# Patient Record
Sex: FEMALE | Race: WHITE | Employment: FULL TIME | URBAN - METROPOLITAN AREA
[De-identification: names, ages, dates, MRNs, and addresses within clinical notes are randomized per-mention and may not be internally consistent; named-entity substitution may affect disease eponyms.]

---

## 2017-07-14 PROBLEM — J30.1 SEASONAL ALLERGIC RHINITIS DUE TO POLLEN: Status: ACTIVE | Noted: 2017-07-14

## 2017-08-19 PROBLEM — O48.0 POST-TERM PREGNANCY, 40-42 WEEKS OF GESTATION: Status: RESOLVED | Noted: 2017-05-03 | Resolved: 2017-08-19

## 2018-01-19 PROBLEM — G56.02 LEFT CARPAL TUNNEL SYNDROME: Status: ACTIVE | Noted: 2018-01-19

## 2019-02-26 PROBLEM — M25.561 CHRONIC PAIN OF BOTH KNEES: Status: ACTIVE | Noted: 2019-02-26

## 2019-02-26 PROBLEM — M25.562 CHRONIC PAIN OF BOTH KNEES: Status: ACTIVE | Noted: 2019-02-26

## 2019-10-03 PROBLEM — Z3A.35 35 WEEKS GESTATION OF PREGNANCY: Status: ACTIVE | Noted: 2019-10-03

## 2019-11-02 PROBLEM — Z3A.39 39 WEEKS GESTATION OF PREGNANCY: Status: ACTIVE | Noted: 2019-11-02

## 2019-11-02 PROBLEM — O36.8130 DECREASED FETAL MOVEMENT AFFECTING MANAGEMENT OF PREGNANCY IN THIRD TRIMESTER: Status: RESOLVED | Noted: 2019-10-03 | Resolved: 2019-11-02

## 2019-11-03 PROBLEM — O99.213 MATERNAL OBESITY SYNDROME IN THIRD TRIMESTER: Status: RESOLVED | Noted: 2017-05-03 | Resolved: 2019-11-03

## 2019-11-03 PROBLEM — Z3A.39 39 WEEKS GESTATION OF PREGNANCY: Status: RESOLVED | Noted: 2019-11-02 | Resolved: 2019-11-03

## 2020-01-10 PROBLEM — F32.4 MAJOR DEPRESSIVE DISORDER WITH SINGLE EPISODE, IN PARTIAL REMISSION (HCC): Status: ACTIVE | Noted: 2020-01-10

## 2020-01-10 PROBLEM — F90.2 ATTENTION DEFICIT HYPERACTIVITY DISORDER (ADHD), COMBINED TYPE: Status: ACTIVE | Noted: 2020-01-10

## 2020-01-28 PROBLEM — Z01.818 PREOPERATIVE CLEARANCE: Status: ACTIVE | Noted: 2020-01-28

## 2020-01-28 PROBLEM — K21.9 CHRONIC GERD: Status: ACTIVE | Noted: 2020-01-28

## 2020-02-27 PROBLEM — Z01.818 PREOPERATIVE CLEARANCE: Status: RESOLVED | Noted: 2020-01-28 | Resolved: 2020-02-27

## 2020-05-05 PROBLEM — R60.0 BILATERAL LEG EDEMA: Status: ACTIVE | Noted: 2020-05-05

## 2020-08-11 PROBLEM — M79.89 LEG SWELLING: Status: ACTIVE | Noted: 2020-08-11

## 2020-08-11 PROBLEM — D50.8 IRON DEFICIENCY ANEMIA SECONDARY TO INADEQUATE DIETARY IRON INTAKE: Status: ACTIVE | Noted: 2020-08-11

## 2020-08-11 PROBLEM — J30.1 SEASONAL ALLERGIC RHINITIS DUE TO POLLEN: Status: RESOLVED | Noted: 2017-07-14 | Resolved: 2020-08-11

## 2020-10-05 PROBLEM — Z34.91 PRENATAL CARE IN FIRST TRIMESTER: Status: ACTIVE | Noted: 2020-10-05

## 2021-02-09 PROBLEM — N83.201 CYST OF RIGHT OVARY: Status: RESOLVED | Noted: 2020-10-06 | Resolved: 2021-02-09

## 2021-03-18 PROBLEM — R06.02 SHORTNESS OF BREATH: Status: ACTIVE | Noted: 2021-03-18

## 2021-05-19 PROBLEM — O26.891 SHORTNESS OF BREATH DUE TO PREGNANCY IN FIRST TRIMESTER: Status: RESOLVED | Noted: 2021-03-18 | Resolved: 2021-05-19

## 2021-09-23 PROBLEM — R00.2 PALPITATIONS: Status: ACTIVE | Noted: 2021-09-23

## 2021-10-29 PROBLEM — K44.9 HIATAL HERNIA: Status: ACTIVE | Noted: 2021-10-29

## 2022-02-18 PROBLEM — I47.9 PAROXYSMAL TACHYCARDIA (HCC): Status: ACTIVE | Noted: 2022-02-18

## 2022-05-03 PROBLEM — J31.0 CHRONIC RHINITIS: Status: RESOLVED | Noted: 2022-01-14 | Resolved: 2022-05-03

## 2022-05-03 PROBLEM — R06.02 SHORTNESS OF BREATH: Status: RESOLVED | Noted: 2021-03-18 | Resolved: 2022-05-03

## 2022-05-03 PROBLEM — M79.89 LEG SWELLING: Status: RESOLVED | Noted: 2020-08-11 | Resolved: 2022-05-03

## 2023-03-16 PROBLEM — K21.9 CHRONIC GERD: Status: RESOLVED | Noted: 2020-01-28 | Resolved: 2023-03-16

## 2023-03-21 LAB
C. TRACHOMATIS, EXTERNAL RESULT: NEGATIVE
N. GONORRHOEAE, EXTERNAL RESULT: NEGATIVE

## 2023-04-04 PROBLEM — O46.8X1 SUBCHORIONIC HEMATOMA IN FIRST TRIMESTER: Status: ACTIVE | Noted: 2023-04-04

## 2023-04-04 PROBLEM — Z34.91 PRENATAL CARE IN FIRST TRIMESTER: Status: ACTIVE | Noted: 2023-04-04

## 2023-04-04 PROBLEM — J45.909 ASTHMA AFFECTING PREGNANCY IN FIRST TRIMESTER: Status: ACTIVE | Noted: 2023-04-04

## 2023-04-04 PROBLEM — O99.511 ASTHMA AFFECTING PREGNANCY IN FIRST TRIMESTER: Status: ACTIVE | Noted: 2023-04-04

## 2023-04-04 LAB
ABO, EXTERNAL RESULT: NORMAL
HEP B, EXTERNAL RESULT: NONREACTIVE
HEPATITIS C ANTIBODY, EXTERNAL RESULT: NEGATIVE
HIV, EXTERNAL RESULT: NEGATIVE
RH FACTOR, EXTERNAL RESULT: POSITIVE
RPR, EXTERNAL RESULT: NONREACTIVE
RUBELLA TITER, EXTERNAL RESULT: NORMAL
T. PALLIDUM (SYPHILIS) ANTIBODY, EXTERNAL RESULT: NONREACTIVE

## 2023-05-22 ENCOUNTER — OFFICE VISIT (OUTPATIENT)
Dept: FAMILY MEDICINE CLINIC | Age: 33
End: 2023-05-22
Payer: COMMERCIAL

## 2023-05-22 VITALS
SYSTOLIC BLOOD PRESSURE: 93 MMHG | DIASTOLIC BLOOD PRESSURE: 63 MMHG | HEART RATE: 83 BPM | WEIGHT: 211 LBS | BODY MASS INDEX: 36.22 KG/M2 | OXYGEN SATURATION: 97 %

## 2023-05-22 DIAGNOSIS — I47.9 PAROXYSMAL TACHYCARDIA (HCC): ICD-10-CM

## 2023-05-22 DIAGNOSIS — J45.40 MODERATE PERSISTENT ASTHMA WITHOUT COMPLICATION: Primary | ICD-10-CM

## 2023-05-22 DIAGNOSIS — L98.7 EXCESS SKIN OF ABDOMINAL WALL: ICD-10-CM

## 2023-05-22 DIAGNOSIS — F32.1 CURRENT MODERATE EPISODE OF MAJOR DEPRESSIVE DISORDER WITHOUT PRIOR EPISODE (HCC): ICD-10-CM

## 2023-05-22 PROCEDURE — G8427 DOCREV CUR MEDS BY ELIG CLIN: HCPCS | Performed by: FAMILY MEDICINE

## 2023-05-22 PROCEDURE — 1036F TOBACCO NON-USER: CPT | Performed by: FAMILY MEDICINE

## 2023-05-22 PROCEDURE — G8417 CALC BMI ABV UP PARAM F/U: HCPCS | Performed by: FAMILY MEDICINE

## 2023-05-22 PROCEDURE — 99214 OFFICE O/P EST MOD 30 MIN: CPT | Performed by: FAMILY MEDICINE

## 2023-05-22 ASSESSMENT — PATIENT HEALTH QUESTIONNAIRE - PHQ9
SUM OF ALL RESPONSES TO PHQ QUESTIONS 1-9: 16
10. IF YOU CHECKED OFF ANY PROBLEMS, HOW DIFFICULT HAVE THESE PROBLEMS MADE IT FOR YOU TO DO YOUR WORK, TAKE CARE OF THINGS AT HOME, OR GET ALONG WITH OTHER PEOPLE: 1
6. FEELING BAD ABOUT YOURSELF - OR THAT YOU ARE A FAILURE OR HAVE LET YOURSELF OR YOUR FAMILY DOWN: 2
9. THOUGHTS THAT YOU WOULD BE BETTER OFF DEAD, OR OF HURTING YOURSELF: 1
3. TROUBLE FALLING OR STAYING ASLEEP: 3
2. FEELING DOWN, DEPRESSED OR HOPELESS: 2
5. POOR APPETITE OR OVEREATING: 3
1. LITTLE INTEREST OR PLEASURE IN DOING THINGS: 0
4. FEELING TIRED OR HAVING LITTLE ENERGY: 3
8. MOVING OR SPEAKING SO SLOWLY THAT OTHER PEOPLE COULD HAVE NOTICED. OR THE OPPOSITE, BEING SO FIGETY OR RESTLESS THAT YOU HAVE BEEN MOVING AROUND A LOT MORE THAN USUAL: 0
SUM OF ALL RESPONSES TO PHQ QUESTIONS 1-9: 16
SUM OF ALL RESPONSES TO PHQ QUESTIONS 1-9: 16
SUM OF ALL RESPONSES TO PHQ QUESTIONS 1-9: 15
SUM OF ALL RESPONSES TO PHQ9 QUESTIONS 1 & 2: 2
7. TROUBLE CONCENTRATING ON THINGS, SUCH AS READING THE NEWSPAPER OR WATCHING TELEVISION: 2

## 2023-05-22 ASSESSMENT — COLUMBIA-SUICIDE SEVERITY RATING SCALE - C-SSRS
6. HAVE YOU EVER DONE ANYTHING, STARTED TO DO ANYTHING, OR PREPARED TO DO ANYTHING TO END YOUR LIFE?: NO
1. WITHIN THE PAST MONTH, HAVE YOU WISHED YOU WERE DEAD OR WISHED YOU COULD GO TO SLEEP AND NOT WAKE UP?: NO
2. HAVE YOU ACTUALLY HAD ANY THOUGHTS OF KILLING YOURSELF?: NO

## 2023-05-22 NOTE — PROGRESS NOTES
She presents today for follow-up of her asthma tachycardia depression and she has been having problems with excessive skin on her lower abdomen causing chafing and sometimes rash she is 14 weeks pregnant her OB told her she could use an abdominal support as long as it did not constrict the baby or was not too tight. She states Negin has them. She had a couple small skin tags around her eyes she wanted me to look at and she needed okay to continue to refill her inhalers. She is off her psychiatric medicine because she is pregnant she states the OB told her she could use Zoloft but with her first pregnancy she tried Zoloft and it did not do anything so she is discontinuing to talk to her counselor on a regular basis and states she will be fine. Does admit she is mildly depressed but it is mostly situational  Tests and documents reviewed today: last note     Objective:   BP 93/63   Pulse 83   Wt 211 lb (95.7 kg)   LMP  (LMP Unknown)   SpO2 97%   BMI 36.22 kg/m²   BP Readings from Last 3 Encounters:   05/22/23 93/63   05/11/23 115/70   05/04/23 102/68     Physical Exam  Vitals reviewed. Constitutional:       Appearance: She is well-developed. She is not toxic-appearing. HENT:      Head: Normocephalic. Neck:      Thyroid: No thyroid mass or thyromegaly. Cardiovascular:      Rate and Rhythm: Normal rate and regular rhythm. Heart sounds: Normal heart sounds. No murmur heard. Pulmonary:      Effort: No respiratory distress. Breath sounds: Normal breath sounds. No wheezing or rales. Abdominal:      General: There is no distension. Palpations: Abdomen is soft. There is no mass. Tenderness: There is no abdominal tenderness. There is no guarding or rebound. Musculoskeletal:      Cervical back: Neck supple. Lymphadenopathy:      Cervical: No cervical adenopathy. Upper Body:      Right upper body: No supraclavicular adenopathy. Skin:     General: Skin is warm.    Neurological:

## 2023-05-22 NOTE — PATIENT INSTRUCTIONS
Please read the healthy family handout that you were given and share it with your family. Please compare this printed medication list with your medications at home to be sure they are the same. If you have any medications that are different please contact us immediately at [phone]. Also review your allergies that we have listed, these may also include medications that you have not been able to tolerate, make sure everything listed is correct. If you have any allergies that are different please contact us immediately at [phone]. You may receive a survey in the mail or by email asking about your experience during your visit today. Please complete and return to us so we know how we are serving you.

## 2023-05-31 ENCOUNTER — ROUTINE PRENATAL (OUTPATIENT)
Dept: OBGYN CLINIC | Age: 33
End: 2023-05-31
Payer: COMMERCIAL

## 2023-05-31 VITALS
HEART RATE: 98 BPM | WEIGHT: 211.4 LBS | SYSTOLIC BLOOD PRESSURE: 110 MMHG | DIASTOLIC BLOOD PRESSURE: 68 MMHG | BODY MASS INDEX: 36.29 KG/M2

## 2023-05-31 DIAGNOSIS — Z34.92 PRENATAL CARE IN SECOND TRIMESTER: ICD-10-CM

## 2023-05-31 DIAGNOSIS — Z98.84 S/P LAPAROSCOPIC SLEEVE GASTRECTOMY: ICD-10-CM

## 2023-05-31 DIAGNOSIS — O46.8X1 SUBCHORIONIC HEMATOMA IN FIRST TRIMESTER, SINGLE OR UNSPECIFIED FETUS: Primary | ICD-10-CM

## 2023-05-31 DIAGNOSIS — O99.512 ASTHMA AFFECTING PREGNANCY IN SECOND TRIMESTER: ICD-10-CM

## 2023-05-31 DIAGNOSIS — J45.909 ASTHMA AFFECTING PREGNANCY IN SECOND TRIMESTER: ICD-10-CM

## 2023-05-31 DIAGNOSIS — O41.8X10 SUBCHORIONIC HEMATOMA IN FIRST TRIMESTER, SINGLE OR UNSPECIFIED FETUS: Primary | ICD-10-CM

## 2023-05-31 PROCEDURE — 99213 OFFICE O/P EST LOW 20 MIN: CPT | Performed by: OBSTETRICS & GYNECOLOGY

## 2023-05-31 PROCEDURE — G8417 CALC BMI ABV UP PARAM F/U: HCPCS | Performed by: OBSTETRICS & GYNECOLOGY

## 2023-05-31 PROCEDURE — 1036F TOBACCO NON-USER: CPT | Performed by: OBSTETRICS & GYNECOLOGY

## 2023-05-31 PROCEDURE — G8427 DOCREV CUR MEDS BY ELIG CLIN: HCPCS | Performed by: OBSTETRICS & GYNECOLOGY

## 2023-05-31 RX ORDER — ONDANSETRON 4 MG/1
4 TABLET, FILM COATED ORAL DAILY PRN
Qty: 30 TABLET | Refills: 0 | Status: SHIPPED | OUTPATIENT
Start: 2023-05-31

## 2023-05-31 NOTE — PROGRESS NOTES
Maternal emotional well being screening form completed and reviewed with patient. Current score is 0. Patient given referral to [address] [place] [address] ([phone]):  No

## 2023-05-31 NOTE — PROGRESS NOTES
Return OB Office Visit    CC:   Chief Complaint   Patient presents with    Routine Prenatal Visit       HPI:  Pt seen and examined. No concerns/complaints. Denies VB, LOF, ctx. +small fetal flutters. Maternal wellness questionnaire reviewed - no concerns today. Score 9.      Objective:  /68   Pulse 98   Wt 211 lb 6.4 oz (95.9 kg)   LMP  (LMP Unknown)   BMI 36.29 kg/m²   Gen: AO, NAD  Abd: Soft, NT  FHT: 160    Assessment/Plan:  28 y.o. [id number] at 15w4d (Estimated Date of Delivery: 11/18/23) presents for HUMERA appointment:     Problem List Items Addressed This Visit          Respiratory    Asthma affecting pregnancy in second trimester     Stable on meds, monitor closely             Other    S/P laparoscopic sleeve gastrectomy     Has seen MFM, see note in Care Everywhere  - Recommend qtrimester CBC, iron, ferritin, calcium and vitamin D  - plan qid accuchecks for 1 week rather than 1hr GTT for early screening and q1-2 months if wnl  - needs level 2 anatomy with MFM  - q4wk growth, 2x/week ANFS  - deliver 39-40 weeks or sooner PRN          Prenatal care in second trimester     - FWB: reassuring by US today  - Genetic screening: [id number] neg, it's a boy!  - Anatomy scan: 20 weeks with MFM  - Tdap: 28 weeks  - PNL: A+/ab-, RI, HepB/C neg, RPRnr, HIV neg, VZV immune, UDS neg, Hgb 12., GCCT/trich neg, UCx no growth          Subchorionic hematoma in first trimester (RESOLVED) - Primary       Dispo: RTC in 4 weeks  Abhilash Brunner MD

## 2023-06-01 ENCOUNTER — TELEPHONE (OUTPATIENT)
Dept: OBGYN CLINIC | Age: 33
End: 2023-06-01

## 2023-06-01 PROBLEM — Z34.92 PRENATAL CARE IN SECOND TRIMESTER: Status: ACTIVE | Noted: 2023-04-04

## 2023-06-01 PROBLEM — E66.01 SEVERE OBESITY (BMI 35.0-39.9) WITH COMORBIDITY (HCC): Status: RESOLVED | Noted: 2017-04-06 | Resolved: 2023-06-01

## 2023-06-01 PROBLEM — O99.512 ASTHMA AFFECTING PREGNANCY IN SECOND TRIMESTER: Status: ACTIVE | Noted: 2023-04-04

## 2023-06-01 PROBLEM — Z34.93 PRENATAL CARE IN THIRD TRIMESTER: Status: RESOLVED | Noted: 2020-10-05 | Resolved: 2023-06-01

## 2023-06-01 NOTE — ASSESSMENT & PLAN NOTE
- FWB: reassuring by US today  - Genetic screening: [id number] neg, it's a boy!  - Anatomy scan: 20 weeks with MFM  - Tdap: 28 weeks  - PNL: A+/ab-, RI, HepB/C neg, RPRnr, HIV neg, VZV immune, UDS neg, Hgb 12., GCCT/trich neg, UCx no growth

## 2023-06-01 NOTE — ASSESSMENT & PLAN NOTE
Has seen MFM, see note in Care Everywhere  - Recommend qtrimester CBC, iron, ferritin, calcium and vitamin D  - plan qid accuchecks for 1 week rather than 1hr GTT for early screening and q1-2 months if wnl  - needs level 2 anatomy with MFM  - q4wk growth, 2x/week ANFS  - deliver 39-40 weeks or sooner PRN

## 2023-06-01 NOTE — TELEPHONE ENCOUNTER
Patient called to let Dr. Kate Minor know she has her anatomy set up with [facility] on 6/20/23. They are needing the order faxed or they told her they will send her away with out the order. Please fax order to [facility]. Routing to Dr. Kate Minor and Tequila Hodge.

## 2023-06-05 ENCOUNTER — TELEPHONE (OUTPATIENT)
Dept: OBGYN CLINIC | Age: 33
End: 2023-06-05

## 2023-06-05 RX ORDER — ONDANSETRON 4 MG/1
4 TABLET, ORALLY DISINTEGRATING ORAL 3 TIMES DAILY PRN
Qty: 21 TABLET | Refills: 0 | Status: SHIPPED | OUTPATIENT
Start: 2023-06-05

## 2023-06-05 NOTE — TELEPHONE ENCOUNTER
OK for work note for today. Would recommend continuing zofran and hydration. If she is unable to keep anything down and continues to have symptoms would need evaluation in the ER for possible IV fluids, etc. Thanks.

## 2023-06-05 NOTE — TELEPHONE ENCOUNTER
Pt called reporting she believes she has a stomach virus. Pt has been experiencing diarrhea and emesis since Friday. Pt stated her PCP wanted her to contact her OB due to her being pregnant. Pt states she is not able to keep food or fluids down. Pt also states she tries to take Zofran but she also is having a hard time keeping that down as well. Pt denies fever. Pt has not been tested for Flu/Covid. Pt requesting doctors recommendations as well as a work note for today and tomorrow. Please Advise.

## 2023-06-05 NOTE — TELEPHONE ENCOUNTER
Pt requesting dissolving tablets. Pended medication. Note provided for today. Please sign or advise.

## 2023-06-21 PROBLEM — O44.40 LOW-LYING PLACENTA: Status: ACTIVE | Noted: 2023-06-21

## 2023-06-29 ENCOUNTER — ROUTINE PRENATAL (OUTPATIENT)
Dept: OBGYN CLINIC | Age: 33
End: 2023-06-29
Payer: COMMERCIAL

## 2023-06-29 VITALS
SYSTOLIC BLOOD PRESSURE: 116 MMHG | DIASTOLIC BLOOD PRESSURE: 68 MMHG | HEART RATE: 91 BPM | BODY MASS INDEX: 37.08 KG/M2 | WEIGHT: 216 LBS

## 2023-06-29 DIAGNOSIS — Z98.84 S/P LAPAROSCOPIC SLEEVE GASTRECTOMY: ICD-10-CM

## 2023-06-29 DIAGNOSIS — O41.8X10 SUBCHORIONIC HEMATOMA IN FIRST TRIMESTER, SINGLE OR UNSPECIFIED FETUS: ICD-10-CM

## 2023-06-29 DIAGNOSIS — O99.512 ASTHMA AFFECTING PREGNANCY IN SECOND TRIMESTER: ICD-10-CM

## 2023-06-29 DIAGNOSIS — O46.8X1 SUBCHORIONIC HEMATOMA IN FIRST TRIMESTER, SINGLE OR UNSPECIFIED FETUS: ICD-10-CM

## 2023-06-29 DIAGNOSIS — Z34.92 PRENATAL CARE IN SECOND TRIMESTER: Primary | ICD-10-CM

## 2023-06-29 DIAGNOSIS — J45.909 ASTHMA AFFECTING PREGNANCY IN SECOND TRIMESTER: ICD-10-CM

## 2023-06-29 DIAGNOSIS — O44.40 LOW-LYING PLACENTA: ICD-10-CM

## 2023-06-29 PROCEDURE — G8417 CALC BMI ABV UP PARAM F/U: HCPCS | Performed by: OBSTETRICS & GYNECOLOGY

## 2023-06-29 PROCEDURE — G8427 DOCREV CUR MEDS BY ELIG CLIN: HCPCS | Performed by: OBSTETRICS & GYNECOLOGY

## 2023-06-29 PROCEDURE — 99213 OFFICE O/P EST LOW 20 MIN: CPT | Performed by: OBSTETRICS & GYNECOLOGY

## 2023-06-29 PROCEDURE — 1036F TOBACCO NON-USER: CPT | Performed by: OBSTETRICS & GYNECOLOGY

## 2023-07-03 RX ORDER — FLUTICASONE PROPIONATE 50 MCG
2 SPRAY, SUSPENSION (ML) NASAL DAILY
Qty: 16 G | Refills: 5 | Status: SHIPPED | OUTPATIENT
Start: 2023-07-03

## 2023-07-03 RX ORDER — ALBUTEROL SULFATE 90 UG/1
2 AEROSOL, METERED RESPIRATORY (INHALATION) EVERY 6 HOURS PRN
Qty: 18 EACH | Refills: 5 | Status: SHIPPED | OUTPATIENT
Start: 2023-07-03

## 2023-07-18 ENCOUNTER — TELEPHONE (OUTPATIENT)
Dept: OBGYN CLINIC | Age: 33
End: 2023-07-18

## 2023-07-18 NOTE — TELEPHONE ENCOUNTER
Agree with above recommendations, if she is not having any additional bleeding at this time just needs pelvic rest as above. Thanks.

## 2023-07-18 NOTE — TELEPHONE ENCOUNTER
I did not place her on bedrest.  I advised for pelvic rest due to her previa and told her to limit activity for the weekend since she had a small amount of bleeding after shopping. Will defer to her primary for additional time off. Thank you.

## 2023-07-18 NOTE — TELEPHONE ENCOUNTER
Pt misunderstood pelvic rest for bed rest and she has missed work yesterday and today. Pt states she will lose her job if she does not obtain a letter for those two days. Are we able to provide letter for yesterday and today? Please Advise    I explained the meaning of pelvic rest to pt, she now understands.

## 2023-07-18 NOTE — TELEPHONE ENCOUNTER
Pt stated she spoke to the on call doctor on Sunday night regarding bleeding in pregnancy. Pt stated the on call doctor placed her on bed rest until her next scheduled appt on 7/28/23. Pt is requesting a letter for work stating this. Please Advise if okay for letter?

## 2023-07-27 ENCOUNTER — TELEPHONE (OUTPATIENT)
Dept: OBGYN CLINIC | Age: 33
End: 2023-07-27

## 2023-07-27 NOTE — TELEPHONE ENCOUNTER
Pt returned call and checked her blood sugar after eating and she is now at 100, pt feels better but is still shaky. Informed pt to eat small frequent snacks throughout the day especially if she starts to feel shaky. 's pt.

## 2023-07-27 NOTE — TELEPHONE ENCOUNTER
Pt 23w5d. She is calling because she got dizzy at work. Says she was feeling off, grabbed the counter, did Not fall. They checked her FSBS and it was 50. She is currently eating cheese and crackers and does feel a little better. She will recheck her blood sugar when she is finished eating and call the office. Aware she will need to present to triage if remains low.  Please advise

## 2023-07-28 ENCOUNTER — ROUTINE PRENATAL (OUTPATIENT)
Dept: OBGYN CLINIC | Age: 33
End: 2023-07-28
Payer: COMMERCIAL

## 2023-07-28 VITALS
DIASTOLIC BLOOD PRESSURE: 61 MMHG | WEIGHT: 221.6 LBS | SYSTOLIC BLOOD PRESSURE: 106 MMHG | BODY MASS INDEX: 38.04 KG/M2 | HEART RATE: 91 BPM

## 2023-07-28 DIAGNOSIS — O44.02 COMPLETE PLACENTA PREVIA NOS OR WITHOUT HEMORRHAGE, SECOND TRIMESTER: ICD-10-CM

## 2023-07-28 DIAGNOSIS — Z34.92 PRENATAL CARE IN SECOND TRIMESTER: Primary | ICD-10-CM

## 2023-07-28 DIAGNOSIS — Z98.84 S/P LAPAROSCOPIC SLEEVE GASTRECTOMY: ICD-10-CM

## 2023-07-28 DIAGNOSIS — O44.40 LOW-LYING PLACENTA: ICD-10-CM

## 2023-07-28 PROCEDURE — 99213 OFFICE O/P EST LOW 20 MIN: CPT | Performed by: OBSTETRICS & GYNECOLOGY

## 2023-07-28 PROCEDURE — 1036F TOBACCO NON-USER: CPT | Performed by: OBSTETRICS & GYNECOLOGY

## 2023-07-28 PROCEDURE — G8417 CALC BMI ABV UP PARAM F/U: HCPCS | Performed by: OBSTETRICS & GYNECOLOGY

## 2023-07-28 PROCEDURE — G8427 DOCREV CUR MEDS BY ELIG CLIN: HCPCS | Performed by: OBSTETRICS & GYNECOLOGY

## 2023-07-28 RX ORDER — DIMETHICONE 13 MG/ML
1 LOTION TOPICAL 4 TIMES DAILY
Qty: 100 EACH | Refills: 1 | Status: SHIPPED | OUTPATIENT
Start: 2023-07-28

## 2023-07-28 RX ORDER — GLUCOSAMINE HCL/CHONDROITIN SU 500-400 MG
CAPSULE ORAL
Qty: 100 STRIP | Refills: 1 | Status: SHIPPED | OUTPATIENT
Start: 2023-07-28

## 2023-08-08 RX ORDER — FLUTICASONE PROPIONATE 50 MCG
SPRAY, SUSPENSION (ML) NASAL
Qty: 1 EACH | Refills: 5 | Status: SHIPPED | OUTPATIENT
Start: 2023-08-08

## 2023-08-09 RX ORDER — LANCETS 33 GAUGE
EACH MISCELLANEOUS
Qty: 100 EACH | Refills: 1 | Status: SHIPPED | OUTPATIENT
Start: 2023-08-09

## 2023-08-24 ENCOUNTER — OFFICE VISIT (OUTPATIENT)
Dept: CARDIOLOGY CLINIC | Age: 33
End: 2023-08-24
Payer: COMMERCIAL

## 2023-08-24 ENCOUNTER — TELEPHONE (OUTPATIENT)
Dept: OBGYN CLINIC | Age: 33
End: 2023-08-24

## 2023-08-24 VITALS
WEIGHT: 228.6 LBS | HEART RATE: 94 BPM | SYSTOLIC BLOOD PRESSURE: 110 MMHG | OXYGEN SATURATION: 98 % | BODY MASS INDEX: 39.24 KG/M2 | DIASTOLIC BLOOD PRESSURE: 72 MMHG

## 2023-08-24 DIAGNOSIS — R06.02 SOB (SHORTNESS OF BREATH): ICD-10-CM

## 2023-08-24 DIAGNOSIS — R00.2 PALPITATIONS: ICD-10-CM

## 2023-08-24 DIAGNOSIS — E78.2 MIXED HYPERLIPIDEMIA: Primary | ICD-10-CM

## 2023-08-24 DIAGNOSIS — I47.9 PAROXYSMAL TACHYCARDIA (HCC): ICD-10-CM

## 2023-08-24 PROCEDURE — 99214 OFFICE O/P EST MOD 30 MIN: CPT | Performed by: INTERNAL MEDICINE

## 2023-08-24 PROCEDURE — 1036F TOBACCO NON-USER: CPT | Performed by: INTERNAL MEDICINE

## 2023-08-24 PROCEDURE — G8427 DOCREV CUR MEDS BY ELIG CLIN: HCPCS | Performed by: INTERNAL MEDICINE

## 2023-08-24 PROCEDURE — G8417 CALC BMI ABV UP PARAM F/U: HCPCS | Performed by: INTERNAL MEDICINE

## 2023-08-24 PROCEDURE — 93000 ELECTROCARDIOGRAM COMPLETE: CPT | Performed by: INTERNAL MEDICINE

## 2023-08-24 RX ORDER — LABETALOL 100 MG/1
100 TABLET, FILM COATED ORAL 2 TIMES DAILY
Qty: 180 TABLET | Refills: 3 | Status: SHIPPED | OUTPATIENT
Start: 2023-08-24

## 2023-08-24 NOTE — PATIENT INSTRUCTIONS
Plan:  ~Recommend a cardiac event monitor. Call your insurance company and see if they will cover Vital connect monitor or MediLynx  ~Plain exercise stress test- modified niya    ~Recommend starting Labetalol 100 mg twice a day- check with your OBGYN prior to starting   Cardiac medications reviewed including indications and pertinent side effects. Medication list updated at this visit. Check blood pressure and heart rate at home a few times per week- keep a log with dates and times and bring to office visit   Regular exercise and following a healthy diet encouraged   Follow up with me in December     Your provider has ordered testing for further evaluation. An order/prescription has been included in your paper work. To schedule outpatient testing, contact Central Scheduling by calling [address] [place] [address] ([phone]).

## 2023-08-24 NOTE — TELEPHONE ENCOUNTER
Patient was just seen at her cardiologist today. They ordered a stress test and labetalol 100mg BID. She wants to make sure this is okay for her to take and have the test as she is currently 27w5d. She will be seeing Dr. Guero Flores tomorrow and okay to review at the appointment. Routing to Dr. Guero Flores, patient of Dr. Eliza Luo.

## 2023-08-25 ENCOUNTER — ROUTINE PRENATAL (OUTPATIENT)
Dept: OBGYN CLINIC | Age: 33
End: 2023-08-25
Payer: COMMERCIAL

## 2023-08-25 VITALS
HEART RATE: 100 BPM | WEIGHT: 230 LBS | BODY MASS INDEX: 39.48 KG/M2 | SYSTOLIC BLOOD PRESSURE: 128 MMHG | DIASTOLIC BLOOD PRESSURE: 82 MMHG

## 2023-08-25 DIAGNOSIS — O44.02 COMPLETE PLACENTA PREVIA NOS OR WITHOUT HEMORRHAGE, SECOND TRIMESTER: ICD-10-CM

## 2023-08-25 DIAGNOSIS — O99.512 ASTHMA AFFECTING PREGNANCY IN SECOND TRIMESTER: ICD-10-CM

## 2023-08-25 DIAGNOSIS — J45.909 ASTHMA AFFECTING PREGNANCY IN SECOND TRIMESTER: ICD-10-CM

## 2023-08-25 DIAGNOSIS — O46.8X1 SUBCHORIONIC HEMATOMA IN FIRST TRIMESTER, SINGLE OR UNSPECIFIED FETUS: ICD-10-CM

## 2023-08-25 DIAGNOSIS — N89.8 VAGINAL DISCHARGE DURING PREGNANCY IN THIRD TRIMESTER: ICD-10-CM

## 2023-08-25 DIAGNOSIS — Z34.93 PRENATAL CARE IN THIRD TRIMESTER: Primary | ICD-10-CM

## 2023-08-25 DIAGNOSIS — O26.893 VAGINAL DISCHARGE DURING PREGNANCY IN THIRD TRIMESTER: ICD-10-CM

## 2023-08-25 DIAGNOSIS — Z98.84 S/P LAPAROSCOPIC SLEEVE GASTRECTOMY: ICD-10-CM

## 2023-08-25 DIAGNOSIS — O41.8X10 SUBCHORIONIC HEMATOMA IN FIRST TRIMESTER, SINGLE OR UNSPECIFIED FETUS: ICD-10-CM

## 2023-08-25 DIAGNOSIS — O44.40 LOW-LYING PLACENTA: ICD-10-CM

## 2023-08-25 DIAGNOSIS — Z36.89 ENCOUNTER FOR ULTRASOUND TO CHECK FETAL GROWTH: ICD-10-CM

## 2023-08-25 PROCEDURE — G8417 CALC BMI ABV UP PARAM F/U: HCPCS | Performed by: OBSTETRICS & GYNECOLOGY

## 2023-08-25 PROCEDURE — 99213 OFFICE O/P EST LOW 20 MIN: CPT | Performed by: OBSTETRICS & GYNECOLOGY

## 2023-08-25 PROCEDURE — G8427 DOCREV CUR MEDS BY ELIG CLIN: HCPCS | Performed by: OBSTETRICS & GYNECOLOGY

## 2023-08-25 PROCEDURE — 1036F TOBACCO NON-USER: CPT | Performed by: OBSTETRICS & GYNECOLOGY

## 2023-08-26 LAB
CANDIDA DNA VAG QL NAA+PROBE: NORMAL
G VAGINALIS DNA SPEC QL NAA+PROBE: NORMAL
T VAGINALIS DNA VAG QL NAA+PROBE: NORMAL

## 2023-09-05 ENCOUNTER — HOSPITAL ENCOUNTER (OUTPATIENT)
Dept: NON INVASIVE DIAGNOSTICS | Age: 33
Discharge: HOME | End: 2023-09-05
Payer: COMMERCIAL

## 2023-09-05 ENCOUNTER — HOSPITAL ENCOUNTER (OUTPATIENT)
Age: 33
Discharge: HOME | End: 2023-09-05
Payer: COMMERCIAL

## 2023-09-05 DIAGNOSIS — R06.02 SOB (SHORTNESS OF BREATH): ICD-10-CM

## 2023-09-05 DIAGNOSIS — E66.01 SEVERE OBESITY (BMI 35.0-39.9) WITH COMORBIDITY (HCC): ICD-10-CM

## 2023-09-05 DIAGNOSIS — Z98.84 S/P LAPAROSCOPIC SLEEVE GASTRECTOMY: ICD-10-CM

## 2023-09-05 DIAGNOSIS — E78.2 MIXED HYPERLIPIDEMIA: ICD-10-CM

## 2023-09-05 DIAGNOSIS — R73.03 PREDIABETES: ICD-10-CM

## 2023-09-05 DIAGNOSIS — E55.9 VITAMIN D DEFICIENCY: ICD-10-CM

## 2023-09-05 DIAGNOSIS — R00.2 PALPITATIONS: ICD-10-CM

## 2023-09-05 DIAGNOSIS — I47.9 PAROXYSMAL TACHYCARDIA (HCC): ICD-10-CM

## 2023-09-05 LAB
25(OH)D3 SERPL-MCNC: 41.9 NG/ML
ALBUMIN SERPL-MCNC: 3.6 G/DL (ref 3.4–5)
ALBUMIN/GLOB SERPL: 1.1 {RATIO} (ref 1.1–2.2)
ALP SERPL-CCNC: 96 U/L (ref 40–129)
ALT SERPL-CCNC: <5 U/L (ref 10–40)
ANION GAP SERPL CALCULATED.3IONS-SCNC: 13 MMOL/L (ref 3–16)
AST SERPL-CCNC: 10 U/L (ref 15–37)
BASOPHILS # BLD: 0 K/UL (ref 0–0.2)
BASOPHILS NFR BLD: 0.4 %
BILIRUB SERPL-MCNC: 0.3 MG/DL (ref 0–1)
BUN SERPL-MCNC: 5 MG/DL (ref 7–20)
CALCIUM SERPL-MCNC: 9 MG/DL (ref 8.3–10.6)
CHLORIDE SERPL-SCNC: 99 MMOL/L (ref 99–110)
CO2 SERPL-SCNC: 20 MMOL/L (ref 21–32)
CREAT SERPL-MCNC: <0.5 MG/DL (ref 0.6–1.1)
DEPRECATED RDW RBC AUTO: 15.4 % (ref 12.4–15.4)
EOSINOPHIL # BLD: 0.2 K/UL (ref 0–0.6)
EOSINOPHIL NFR BLD: 2 %
FOLATE SERPL-MCNC: 18.45 NG/ML (ref 4.78–24.2)
GFR SERPLBLD CREATININE-BSD FMLA CKD-EPI: >60 ML/MIN/{1.73_M2}
GLUCOSE SERPL-MCNC: 91 MG/DL (ref 70–99)
HCT VFR BLD AUTO: 32.5 % (ref 36–48)
HGB BLD-MCNC: 10.6 G/DL (ref 12–16)
INR PPP: 0.99 (ref 0.84–1.16)
LYMPHOCYTES # BLD: 1.1 K/UL (ref 1–5.1)
LYMPHOCYTES NFR BLD: 10.9 %
MCH RBC QN AUTO: 24.5 PG (ref 26–34)
MCHC RBC AUTO-ENTMCNC: 32.8 G/DL (ref 31–36)
MCV RBC AUTO: 74.7 FL (ref 80–100)
MONOCYTES # BLD: 0.6 K/UL (ref 0–1.3)
MONOCYTES NFR BLD: 5.6 %
NEUTROPHILS # BLD: 8.4 K/UL (ref 1.7–7.7)
NEUTROPHILS NFR BLD: 81.1 %
PLATELET # BLD AUTO: 196 K/UL (ref 135–450)
PMV BLD AUTO: 10.2 FL (ref 5–10.5)
POTASSIUM SERPL-SCNC: 3.8 MMOL/L (ref 3.5–5.1)
PROT SERPL-MCNC: 6.9 G/DL (ref 6.4–8.2)
PROTHROMBIN TIME: 13.1 SEC (ref 11.5–14.8)
RBC # BLD AUTO: 4.35 M/UL (ref 4–5.2)
SODIUM SERPL-SCNC: 132 MMOL/L (ref 136–145)
TSH SERPL DL<=0.005 MIU/L-ACNC: 0.71 UIU/ML (ref 0.27–4.2)
VIT B12 SERPL-MCNC: 474 PG/ML (ref 211–911)
WBC # BLD AUTO: 10.3 K/UL (ref 4–11)

## 2023-09-05 PROCEDURE — 80061 LIPID PANEL: CPT

## 2023-09-05 PROCEDURE — 84446 ASSAY OF VITAMIN E: CPT

## 2023-09-05 PROCEDURE — 93017 CV STRESS TEST TRACING ONLY: CPT

## 2023-09-05 PROCEDURE — 85025 COMPLETE CBC W/AUTO DIFF WBC: CPT

## 2023-09-05 PROCEDURE — 84425 ASSAY OF VITAMIN B-1: CPT

## 2023-09-05 PROCEDURE — 80053 COMPREHEN METABOLIC PANEL: CPT

## 2023-09-05 PROCEDURE — 83036 HEMOGLOBIN GLYCOSYLATED A1C: CPT

## 2023-09-05 PROCEDURE — 83540 ASSAY OF IRON: CPT

## 2023-09-05 PROCEDURE — 84443 ASSAY THYROID STIM HORMONE: CPT

## 2023-09-05 PROCEDURE — 83550 IRON BINDING TEST: CPT

## 2023-09-05 PROCEDURE — 36415 COLL VENOUS BLD VENIPUNCTURE: CPT

## 2023-09-05 PROCEDURE — 84590 ASSAY OF VITAMIN A: CPT

## 2023-09-05 PROCEDURE — 85610 PROTHROMBIN TIME: CPT

## 2023-09-05 PROCEDURE — 82746 ASSAY OF FOLIC ACID SERUM: CPT

## 2023-09-05 PROCEDURE — 82306 VITAMIN D 25 HYDROXY: CPT

## 2023-09-05 PROCEDURE — 82607 VITAMIN B-12: CPT

## 2023-09-05 NOTE — PROGRESS NOTES
Plain GXT with modified niya protocol stress test complete. Patient met THR, complained of shortness of breath and asthma related chest tightness rated at at 4.5/10. All symptoms resolved with rest. Discharge instructions given to pt. Pt verbalizes understanding to discharge instructions.

## 2023-09-06 ENCOUNTER — PATIENT MESSAGE (OUTPATIENT)
Dept: OBGYN CLINIC | Age: 33
End: 2023-09-06

## 2023-09-06 LAB
CHOLEST SERPL-MCNC: 284 MG/DL (ref 0–199)
EST. AVERAGE GLUCOSE BLD GHB EST-MCNC: 99.7 MG/DL
HBA1C MFR BLD: 5.1 %
HDLC SERPL-MCNC: 80 MG/DL (ref 40–60)
IRON SATN MFR SERPL: 6 % (ref 15–50)
IRON SERPL-MCNC: 30 UG/DL (ref 37–145)
LDLC SERPL CALC-MCNC: 168 MG/DL
TIBC SERPL-MCNC: 500 UG/DL (ref 260–445)
TRIGL SERPL-MCNC: 179 MG/DL (ref 0–150)
VLDLC SERPL CALC-MCNC: 36 MG/DL

## 2023-09-07 ENCOUNTER — TELEPHONE (OUTPATIENT)
Dept: CARDIOLOGY CLINIC | Age: 33
End: 2023-09-07

## 2023-09-07 ENCOUNTER — TELEMEDICINE (OUTPATIENT)
Dept: FAMILY MEDICINE CLINIC | Age: 33
End: 2023-09-07

## 2023-09-07 DIAGNOSIS — J45.40 MODERATE PERSISTENT ASTHMA WITHOUT COMPLICATION: ICD-10-CM

## 2023-09-07 DIAGNOSIS — U07.1 COVID-19: Primary | ICD-10-CM

## 2023-09-07 DIAGNOSIS — Z87.01 HISTORY OF PNEUMONIA: ICD-10-CM

## 2023-09-07 RX ORDER — ALBUTEROL SULFATE 90 UG/1
2 AEROSOL, METERED RESPIRATORY (INHALATION) EVERY 6 HOURS PRN
Qty: 18 EACH | Refills: 5 | Status: SHIPPED | OUTPATIENT
Start: 2023-09-07

## 2023-09-07 RX ORDER — CEFUROXIME AXETIL 500 MG/1
500 TABLET ORAL 2 TIMES DAILY
Qty: 14 TABLET | Refills: 0 | Status: SHIPPED | OUTPATIENT
Start: 2023-09-07 | End: 2023-09-14

## 2023-09-07 RX ORDER — PREDNISONE 20 MG/1
20 TABLET ORAL DAILY
Qty: 5 TABLET | Refills: 0 | Status: SHIPPED | OUTPATIENT
Start: 2023-09-07 | End: 2023-09-12

## 2023-09-07 ASSESSMENT — ENCOUNTER SYMPTOMS
COUGH: 1
ALLERGIC/IMMUNOLOGIC NEGATIVE: 1
SHORTNESS OF BREATH: 0
GASTROINTESTINAL NEGATIVE: 1
WHEEZING: 1
CHEST TIGHTNESS: 0
EYES NEGATIVE: 1

## 2023-09-07 NOTE — PROGRESS NOTES
2023    TELEHEALTH EVALUATION -- Audio/Visual    HPI:    Mallory White (:  1990) has requested an audio/video evaluation for the following concern(s):    Elisabet Trejo presents via virtual visit with concerns of flu like symptoms. Mallory White is a 35 y.o. female who presents for evaluation of influenza like symptoms. Symptoms include chills, headache, myalgias, post nasal drip, and productive cough and have been present for 5 days. She has tried to alleviate the symptoms with  benadryl and allegra  with minimal relief. High risk factors for influenza complications:  history of pneumonia and asthma . Patient reports she tested for COVID 2 days ago however reports the line was not a complete line like the control therefore she thought this was negative. Patient was exposed to friend that was positive for COVID. Patient is 30 weeks pregnant. Review of Systems   Constitutional:  Positive for appetite change, chills and fatigue. Negative for activity change, fever and unexpected weight change. HENT:  Positive for congestion and postnasal drip. Negative for sneezing. Eyes: Negative. Respiratory:  Positive for cough and wheezing. Negative for chest tightness and shortness of breath. Cardiovascular: Negative. Negative for chest pain, palpitations and leg swelling. Gastrointestinal: Negative. Endocrine: Negative. Genitourinary: Negative. Skin: Negative. Allergic/Immunologic: Negative. Neurological:  Positive for headaches. Negative for dizziness. Psychiatric/Behavioral: Negative. Prior to Visit Medications    Medication Sig Taking?  Authorizing Provider   labetalol (NORMODYNE) 100 MG tablet Take 1 tablet by mouth 2 times daily  Patient not taking: Reported on 2023  Ayla Hutchison MD   [employer] Lancets Micro Thin 33G MISC APPLY ROUTE 4 TIMES DAILY  Patient not taking: Reported on 2023  Jp Garrett DO   fluticasone [facility]) 50 MCG/ACT nasal spray

## 2023-09-07 NOTE — PATIENT INSTRUCTIONS
Please read the healthy family handout that you were given and share it with your family. Please compare this printed medication list with your medications at home to be sure they are the same. If you have any medications that are different please contact us immediately at [phone]. Also review your allergies that we have listed, these may also include medications that you have not been able to tolerate, make sure everything listed is correct. If you have any allergies that are different please contact us immediately at [phone]. You may receive a survey in the mail or by email asking about your experience during your visit today. Please complete and return to us so we know how we are serving you.

## 2023-09-07 NOTE — TELEPHONE ENCOUNTER
----- Message from Odell Dempsey MD sent at 9/6/2023  8:51 AM EDT -----  Normal stress test. Reassuring study.

## 2023-09-07 NOTE — TELEPHONE ENCOUNTER
From: Iram Guess  To: Dr. Samantha Boydestic: 9/6/2023 10:07 PM EDT  Subject: Mucus plug    Hi,  I just turned 30 weeks today. I laid down for a nap this evening because I have been feeling sick. I woke up and found that I have lost my mucus plug. I didn't think I needed to contact on-call doc since the plug can regenerate, but [employer] said to contact OB if before 37 weeks. This is baby number 4, so I know what signs to look for for labor; bleeding, consistent contractions, et cet. Aisha Brown I just wanted you to be aware in case anything does escalate ahead of schedule.   Thanks,  Sara Sanders

## 2023-09-07 NOTE — TELEPHONE ENCOUNTER
Spoke with patient. She is monitoring symptoms with no gush of fluids, no vaginal bleeding, and positive fetal movement. She did test positive for Covid a few days ago and will be out of her quarantine period by her appointment on 9/9/23, she will just wear a mask. Patient will continue to monitor and push fluids. She will report to L & D for triage if she has vaginal bleeding, gush of fluids, or decreased fetal movement. Medications safe in pregnancy reviewed as well, patient voiced understanding. Routing to Dr. Rani Islas and on call Dr. Hortencia Manzo.

## 2023-09-07 NOTE — TELEPHONE ENCOUNTER
Created telephone encounter. Spoke with Johana relayed message per MINO regarding stress test. Pt verbalized understanding.

## 2023-09-08 ENCOUNTER — TELEPHONE (OUTPATIENT)
Dept: OBGYN | Age: 33
End: 2023-09-08

## 2023-09-08 LAB
A-TOCOPHEROL VIT E SERPL-MCNC: 17.8 MG/L (ref 5.5–18)
ANNOTATION COMMENT IMP: NORMAL
BETA+GAMMA TOCOPHEROL SERPL-MCNC: 1.5 MG/L (ref 0–6)
RETINYL PALMITATE SERPL-MCNC: 0.03 MG/L (ref 0–0.1)
VIT A SERPL-MCNC: 0.38 MG/L (ref 0.3–1.2)

## 2023-09-08 NOTE — TELEPHONE ENCOUNTER
Patient called complaining of contractions, headache and numbness in her legs. She reports she is drinking water but she is unsure exact quantity. She was recently diagnosed with Covid. Instructed patient to go to birthing center. Please check to see how patient is doing. And please make sure she is scheduled within the next couple of days.

## 2023-09-08 NOTE — TELEPHONE ENCOUNTER
No answer, and voicemail box is full at this time.  Patient does have office visit scheduled 9/11/23

## 2023-09-08 NOTE — PROGRESS NOTES
Patient called complaining of contractions, headache and numbness in her legs. She reports she is drinking water but she is unsure exact quantity. She was recently diagnosed with Covid. Instructed patient to go to birthing center.

## 2023-09-09 ENCOUNTER — HOSPITAL ENCOUNTER (OUTPATIENT)
Age: 33
Discharge: HOME | End: 2023-09-09
Attending: OBSTETRICS & GYNECOLOGY | Admitting: OBSTETRICS & GYNECOLOGY
Payer: COMMERCIAL

## 2023-09-09 ENCOUNTER — TELEPHONE (OUTPATIENT)
Dept: OBGYN | Age: 33
End: 2023-09-09

## 2023-09-09 VITALS
SYSTOLIC BLOOD PRESSURE: 107 MMHG | DIASTOLIC BLOOD PRESSURE: 55 MMHG | RESPIRATION RATE: 18 BRPM | TEMPERATURE: 98.5 F | OXYGEN SATURATION: 100 % | HEART RATE: 90 BPM | HEIGHT: 64 IN | BODY MASS INDEX: 39.27 KG/M2 | WEIGHT: 230 LBS

## 2023-09-09 LAB
ALBUMIN SERPL-MCNC: 3.3 G/DL (ref 3.4–5)
ALBUMIN/GLOB SERPL: 1 {RATIO} (ref 1.1–2.2)
ALP SERPL-CCNC: 87 U/L (ref 40–129)
ALT SERPL-CCNC: <5 U/L (ref 10–40)
ANION GAP SERPL CALCULATED.3IONS-SCNC: 12 MMOL/L (ref 3–16)
AST SERPL-CCNC: 8 U/L (ref 15–37)
BASOPHILS # BLD: 0 K/UL (ref 0–0.2)
BASOPHILS NFR BLD: 0.2 %
BILIRUB SERPL-MCNC: <0.2 MG/DL (ref 0–1)
BUN SERPL-MCNC: 8 MG/DL (ref 7–20)
CALCIUM SERPL-MCNC: 8.4 MG/DL (ref 8.3–10.6)
CHLORIDE SERPL-SCNC: 104 MMOL/L (ref 99–110)
CO2 SERPL-SCNC: 21 MMOL/L (ref 21–32)
CREAT SERPL-MCNC: <0.5 MG/DL (ref 0.6–1.1)
CREAT UR-MCNC: 165.9 MG/DL (ref 28–259)
DEPRECATED RDW RBC AUTO: 15.9 % (ref 12.4–15.4)
EOSINOPHIL # BLD: 0.1 K/UL (ref 0–0.6)
EOSINOPHIL NFR BLD: 0.7 %
GFR SERPLBLD CREATININE-BSD FMLA CKD-EPI: >60 ML/MIN/{1.73_M2}
GLUCOSE SERPL-MCNC: 112 MG/DL (ref 70–99)
HCT VFR BLD AUTO: 30.1 % (ref 36–48)
HGB BLD-MCNC: 9.8 G/DL (ref 12–16)
LYMPHOCYTES # BLD: 1.5 K/UL (ref 1–5.1)
LYMPHOCYTES NFR BLD: 11.6 %
MCH RBC QN AUTO: 24 PG (ref 26–34)
MCHC RBC AUTO-ENTMCNC: 32.8 G/DL (ref 31–36)
MCV RBC AUTO: 73.3 FL (ref 80–100)
MONOCYTES # BLD: 0.5 K/UL (ref 0–1.3)
MONOCYTES NFR BLD: 4 %
NEUTROPHILS # BLD: 11 K/UL (ref 1.7–7.7)
NEUTROPHILS NFR BLD: 83.5 %
PLATELET # BLD AUTO: 196 K/UL (ref 135–450)
PMV BLD AUTO: 9.1 FL (ref 5–10.5)
POTASSIUM SERPL-SCNC: 3.9 MMOL/L (ref 3.5–5.1)
PROT SERPL-MCNC: 6.5 G/DL (ref 6.4–8.2)
PROT UR-MCNC: 14 MG/DL
PROT/CREAT UR-RTO: 0.1 MG/DL
RBC # BLD AUTO: 4.1 M/UL (ref 4–5.2)
SODIUM SERPL-SCNC: 137 MMOL/L (ref 136–145)
WBC # BLD AUTO: 13.2 K/UL (ref 4–11)

## 2023-09-09 PROCEDURE — 82570 ASSAY OF URINE CREATININE: CPT

## 2023-09-09 PROCEDURE — 99211 OFF/OP EST MAY X REQ PHY/QHP: CPT

## 2023-09-09 PROCEDURE — 80053 COMPREHEN METABOLIC PANEL: CPT

## 2023-09-09 PROCEDURE — 2580000003 HC RX 258: Performed by: OBSTETRICS & GYNECOLOGY

## 2023-09-09 PROCEDURE — 85025 COMPLETE CBC W/AUTO DIFF WBC: CPT

## 2023-09-09 PROCEDURE — 84156 ASSAY OF PROTEIN URINE: CPT

## 2023-09-09 RX ORDER — SODIUM CHLORIDE, SODIUM LACTATE, POTASSIUM CHLORIDE, AND CALCIUM CHLORIDE .6; .31; .03; .02 G/100ML; G/100ML; G/100ML; G/100ML
1000 INJECTION, SOLUTION INTRAVENOUS ONCE
Status: COMPLETED | OUTPATIENT
Start: 2023-09-09 | End: 2023-09-09

## 2023-09-09 RX ADMIN — SODIUM CHLORIDE, POTASSIUM CHLORIDE, SODIUM LACTATE AND CALCIUM CHLORIDE 1000 ML: 600; 310; 30; 20 INJECTION, SOLUTION INTRAVENOUS at 20:00

## 2023-09-09 NOTE — TELEPHONE ENCOUNTER
Patient called answering service with elevated blood pressure of  186/153 and elevated pulse. Discussed with patient that she needs to seek evaluation ASAP for evaluation.

## 2023-09-09 NOTE — PROGRESS NOTES
Pt presenting to L&D triage for evaluation of labile BPs at home SBP  / DBP 60-80mm Hg; HR ~ 147 with highest BP. Also has concern related to feeling dizziness/faint when standing up, usually within a minute of standing and worsens the longer she stands. Had mild self-limiting headache with high BP, now resolved; nausea after lunch. Denies visual disturbances; DTRs +1 upper and lower bilat, absent clonus. Hx significant for covid last week - denies fever, chills, dyspnea at present. Denies s/s labor - no cramping, vb or lof. Endorses fm.

## 2023-09-10 DIAGNOSIS — Z00.8 NUTRITIONAL ASSESSMENT: ICD-10-CM

## 2023-09-10 DIAGNOSIS — Z98.84 S/P LAPAROSCOPIC SLEEVE GASTRECTOMY: ICD-10-CM

## 2023-09-10 DIAGNOSIS — D50.9 IRON DEFICIENCY ANEMIA, UNSPECIFIED IRON DEFICIENCY ANEMIA TYPE: Primary | ICD-10-CM

## 2023-09-10 RX ORDER — FERROUS SULFATE 325(65) MG
325 TABLET ORAL
Qty: 30 TABLET | Refills: 5 | Status: SHIPPED | OUTPATIENT
Start: 2023-09-10

## 2023-09-10 NOTE — H&P
Obstetrics Triage History and Physical    CC:   Chief Complaint   Patient presents with    Hypertension       HPI: Samantha Newberry is a 35 y.o. [id number] at 30w0d who presents with complaints of elevated blood pressures. She reports her blood pressures has been low and then high. She called the answering service reporting a blood pressure of 187/150s. She has no obstetrical complaints. She has no complaints at this time. .     Review of Systems: The following ROS was otherwise negative, except as noted in the HPI: constitutional, HEENT, respiratory, cardiovascular, gastrointestinal, genitourinary, skin, musculoskeletal, neurological, psych.      OBGYN Provider : Regina Ferrara    Obstetrical History:  OB History    Para Term  AB Living   5 3 3 0 1 3   SAB IAB Ectopic Molar Multiple Live Births   1 0 0 0 0 3      # Outcome Date GA Lbr Jose/2nd Weight Sex Delivery Anes PTL Lv   5 Current            4 Term 21 39w4d 19:14 / 00:08 8 lb 10.1 oz (3.916 kg) F Vag-Spont None N ALEJANDRA      Birth Comments: patton; ID Band # [id number]      Name: Juvencio Rosenthal: 8  Apgar5: 9   3 SAB 20    U       2 Term 19 39w3d 05:00 / 00:17 8 lb 0.3 oz (3.637 kg) M Vag-Spont None N ALEJANDRA      Birth Comments: PATTON      Name: Luciana Schmitt: 7  Apgar5: 9   1 Term 17 40w3d  6 lb 14 oz (3.118 kg) M Vag-Spont EPI N ALEJANDRA      Name: Coral Jonas: 9  Apgar5: 9     Past Medical History:   Past Medical History:   Diagnosis Date    ADD (attention deficit disorder)     Anemia     Asthma     last used inhaler yesterday    Autoimmune disorder ([address])     Chronic GERD 5/10/4592    Complication of anesthesia     Depression     past history, now resolved    Diabetes mellitus ([address])     pre diabetic; currently takes no meds    History of suicidal tendencies     as a teen    Infertility, female     Left carpal tunnel syndrome 2018    Migraine headache     last migraine 3 weeks ago

## 2023-09-10 NOTE — PROGRESS NOTES
Patient is not in active labor. Pt verbalized understanding of verbal and written discharge instructions and denies having questions at this time. Pt left OB unit at 2100 ambulatory, undelivered, and in stable condition, accompanied by partner.

## 2023-09-11 ENCOUNTER — ROUTINE PRENATAL (OUTPATIENT)
Dept: OBGYN CLINIC | Age: 33
End: 2023-09-11

## 2023-09-11 VITALS
DIASTOLIC BLOOD PRESSURE: 80 MMHG | BODY MASS INDEX: 39.58 KG/M2 | HEART RATE: 98 BPM | WEIGHT: 230.6 LBS | SYSTOLIC BLOOD PRESSURE: 128 MMHG

## 2023-09-11 DIAGNOSIS — Z98.84 S/P LAPAROSCOPIC SLEEVE GASTRECTOMY: ICD-10-CM

## 2023-09-11 DIAGNOSIS — O44.40 LOW-LYING PLACENTA: ICD-10-CM

## 2023-09-11 DIAGNOSIS — Z34.93 PRENATAL CARE IN THIRD TRIMESTER: Primary | ICD-10-CM

## 2023-09-11 DIAGNOSIS — O26.893 VAGINAL DISCHARGE DURING PREGNANCY IN THIRD TRIMESTER: ICD-10-CM

## 2023-09-11 DIAGNOSIS — O44.03 PLACENTA PREVIA IN THIRD TRIMESTER: ICD-10-CM

## 2023-09-11 DIAGNOSIS — N89.8 VAGINAL DISCHARGE DURING PREGNANCY IN THIRD TRIMESTER: ICD-10-CM

## 2023-09-11 DIAGNOSIS — Z59.9 FINANCIAL DIFFICULTIES: ICD-10-CM

## 2023-09-11 SDOH — ECONOMIC STABILITY - INCOME SECURITY: PROBLEM RELATED TO HOUSING AND ECONOMIC CIRCUMSTANCES, UNSPECIFIED: Z59.9

## 2023-09-12 ENCOUNTER — TELEPHONE (OUTPATIENT)
Dept: OBGYN | Age: 33
End: 2023-09-12

## 2023-09-12 ENCOUNTER — HOSPITAL ENCOUNTER (OUTPATIENT)
Age: 33
Discharge: HOME | End: 2023-09-12
Attending: OBSTETRICS & GYNECOLOGY | Admitting: OBSTETRICS & GYNECOLOGY
Payer: COMMERCIAL

## 2023-09-12 ENCOUNTER — HOSPITAL ENCOUNTER (EMERGENCY)
Age: 33
Discharge: ANOTHER HOSPITAL | End: 2023-09-12
Attending: EMERGENCY MEDICINE
Payer: COMMERCIAL

## 2023-09-12 VITALS
WEIGHT: 230 LBS | DIASTOLIC BLOOD PRESSURE: 55 MMHG | TEMPERATURE: 98.5 F | RESPIRATION RATE: 18 BRPM | OXYGEN SATURATION: 100 % | SYSTOLIC BLOOD PRESSURE: 112 MMHG | HEART RATE: 65 BPM | BODY MASS INDEX: 39.27 KG/M2 | HEIGHT: 64 IN

## 2023-09-12 VITALS
RESPIRATION RATE: 16 BRPM | BODY MASS INDEX: 39.27 KG/M2 | HEIGHT: 64 IN | SYSTOLIC BLOOD PRESSURE: 118 MMHG | WEIGHT: 230 LBS | TEMPERATURE: 98 F | DIASTOLIC BLOOD PRESSURE: 66 MMHG | HEART RATE: 96 BPM

## 2023-09-12 DIAGNOSIS — R10.9 ABDOMINAL PAIN DURING PREGNANCY IN THIRD TRIMESTER: Primary | ICD-10-CM

## 2023-09-12 DIAGNOSIS — O26.893 ABDOMINAL PAIN DURING PREGNANCY IN THIRD TRIMESTER: Primary | ICD-10-CM

## 2023-09-12 LAB
APTT BLD: 26 SEC (ref 22.7–35.9)
BASOPHILS # BLD: 0 K/UL (ref 0–0.2)
BASOPHILS NFR BLD: 0.3 %
DEPRECATED RDW RBC AUTO: 15.5 % (ref 12.4–15.4)
EOSINOPHIL # BLD: 0.1 K/UL (ref 0–0.6)
EOSINOPHIL NFR BLD: 0.9 %
FIBRINOGEN PPP-MCNC: 460 MG/DL (ref 243–550)
HCT VFR BLD AUTO: 28 % (ref 36–48)
HGB BLD-MCNC: 9.2 G/DL (ref 12–16)
INR PPP: 1.02 (ref 0.84–1.16)
LYMPHOCYTES # BLD: 2 K/UL (ref 1–5.1)
LYMPHOCYTES NFR BLD: 15.5 %
MCH RBC QN AUTO: 23.9 PG (ref 26–34)
MCHC RBC AUTO-ENTMCNC: 32.8 G/DL (ref 31–36)
MCV RBC AUTO: 73 FL (ref 80–100)
MONOCYTES # BLD: 0.7 K/UL (ref 0–1.3)
MONOCYTES NFR BLD: 5.3 %
NEUTROPHILS # BLD: 10 K/UL (ref 1.7–7.7)
NEUTROPHILS NFR BLD: 78 %
PLATELET # BLD AUTO: 188 K/UL (ref 135–450)
PMV BLD AUTO: 9 FL (ref 5–10.5)
PROTHROMBIN TIME: 13.4 SEC (ref 11.5–14.8)
RBC # BLD AUTO: 3.84 M/UL (ref 4–5.2)
WBC # BLD AUTO: 12.8 K/UL (ref 4–11)

## 2023-09-12 PROCEDURE — 85025 COMPLETE CBC W/AUTO DIFF WBC: CPT

## 2023-09-12 PROCEDURE — 99285 EMERGENCY DEPT VISIT HI MDM: CPT

## 2023-09-12 PROCEDURE — 99211 OFF/OP EST MAY X REQ PHY/QHP: CPT

## 2023-09-12 PROCEDURE — 85730 THROMBOPLASTIN TIME PARTIAL: CPT

## 2023-09-12 PROCEDURE — 85610 PROTHROMBIN TIME: CPT

## 2023-09-12 PROCEDURE — 85384 FIBRINOGEN ACTIVITY: CPT

## 2023-09-12 RX ORDER — FERROUS SULFATE 325(65) MG
325 TABLET ORAL 2 TIMES DAILY
Qty: 60 TABLET | Refills: 0 | Status: SHIPPED | OUTPATIENT
Start: 2023-09-12

## 2023-09-12 ASSESSMENT — ENCOUNTER SYMPTOMS
VOICE CHANGE: 0
DIARRHEA: 0
VOMITING: 0
SHORTNESS OF BREATH: 0
ABDOMINAL PAIN: 1
NAUSEA: 0
TROUBLE SWALLOWING: 0

## 2023-09-12 ASSESSMENT — LIFESTYLE VARIABLES
HOW MANY STANDARD DRINKS CONTAINING ALCOHOL DO YOU HAVE ON A TYPICAL DAY: PATIENT DOES NOT DRINK
HOW OFTEN DO YOU HAVE A DRINK CONTAINING ALCOHOL: NEVER

## 2023-09-12 ASSESSMENT — PAIN DESCRIPTION - ORIENTATION: ORIENTATION: RIGHT;LEFT

## 2023-09-12 ASSESSMENT — PAIN SCALES - GENERAL: PAINLEVEL_OUTOF10: 2

## 2023-09-12 ASSESSMENT — PAIN - FUNCTIONAL ASSESSMENT
PAIN_FUNCTIONAL_ASSESSMENT: 0-10
PAIN_FUNCTIONAL_ASSESSMENT: ACTIVITIES ARE NOT PREVENTED

## 2023-09-12 ASSESSMENT — PAIN DESCRIPTION - DESCRIPTORS: DESCRIPTORS: ACHING

## 2023-09-12 ASSESSMENT — PAIN DESCRIPTION - FREQUENCY: FREQUENCY: CONTINUOUS

## 2023-09-12 ASSESSMENT — PAIN DESCRIPTION - ONSET: ONSET: SUDDEN

## 2023-09-12 ASSESSMENT — PAIN DESCRIPTION - LOCATION: LOCATION: HAND;WRIST;LEG

## 2023-09-12 ASSESSMENT — PAIN DESCRIPTION - PAIN TYPE: TYPE: ACUTE PAIN

## 2023-09-12 NOTE — FLOWSHEET NOTE
Pt verbalized understanding of verbal and written discharge instructions and denies having questions at this time. Pt left OB unit at 1934 ambulatory, undelivered, and in stable condition. Patient is not in active labor.

## 2023-09-12 NOTE — ED NOTES
Patient transferred from this department to [facility] triage at [facility] per OB/Gyn instruction. Patient transported POV per Dr. Treva Lake. Alert and oriented at the time of arrival without change in status from triage/assessment. Ambulatory out of the department at this time. Report called to [facility] OB Triage.        Radha Swanson RN  09/12/23 [id number]

## 2023-09-12 NOTE — ED NOTES
Patient reports trip and fall in the parking lot falling forward. States she caught herself with her hands, but did hit her stomach a little bit on the ground. C/o pain to bilateral wrist/hands and shins. , no contractions, no vaginal drainage/bleeding reported by patient. Fetal movement noted.       Suellen Babb RN  09/12/23 [id number]

## 2023-09-12 NOTE — FLOWSHEET NOTE
Dr. Wen Mojica states pt ok to be discharged home at [address] [place] [address] as long as NST remains reactive.

## 2023-09-12 NOTE — ED NOTES
Dr. Aracelis Raymond returned call for madeleine blackburn     [facility]  09/12/23 4517

## 2023-09-15 ENCOUNTER — ROUTINE PRENATAL (OUTPATIENT)
Dept: OBGYN CLINIC | Age: 33
End: 2023-09-15

## 2023-09-15 VITALS
BODY MASS INDEX: 39.27 KG/M2 | HEIGHT: 64 IN | WEIGHT: 230 LBS | SYSTOLIC BLOOD PRESSURE: 122 MMHG | OXYGEN SATURATION: 100 % | DIASTOLIC BLOOD PRESSURE: 68 MMHG | HEART RATE: 92 BPM

## 2023-09-15 DIAGNOSIS — Z98.84 S/P LAPAROSCOPIC SLEEVE GASTRECTOMY: ICD-10-CM

## 2023-09-15 DIAGNOSIS — O44.03 PLACENTA PREVIA IN THIRD TRIMESTER: ICD-10-CM

## 2023-09-15 DIAGNOSIS — Z34.93 PRENATAL CARE IN THIRD TRIMESTER: Primary | ICD-10-CM

## 2023-09-26 ENCOUNTER — ROUTINE PRENATAL (OUTPATIENT)
Dept: OBGYN CLINIC | Age: 33
End: 2023-09-26
Payer: COMMERCIAL

## 2023-09-26 VITALS
SYSTOLIC BLOOD PRESSURE: 108 MMHG | DIASTOLIC BLOOD PRESSURE: 66 MMHG | WEIGHT: 235 LBS | HEART RATE: 96 BPM | BODY MASS INDEX: 40.34 KG/M2

## 2023-09-26 DIAGNOSIS — J45.909 ASTHMA AFFECTING PREGNANCY IN SECOND TRIMESTER: ICD-10-CM

## 2023-09-26 DIAGNOSIS — O99.512 ASTHMA AFFECTING PREGNANCY IN SECOND TRIMESTER: ICD-10-CM

## 2023-09-26 DIAGNOSIS — O44.40 LOW-LYING PLACENTA: ICD-10-CM

## 2023-09-26 DIAGNOSIS — Z72.820 POOR SLEEP: ICD-10-CM

## 2023-09-26 DIAGNOSIS — Z98.84 S/P LAPAROSCOPIC SLEEVE GASTRECTOMY: ICD-10-CM

## 2023-09-26 DIAGNOSIS — R73.09 BLOOD GLUCOSE ABNORMAL: ICD-10-CM

## 2023-09-26 DIAGNOSIS — O41.8X10 SUBCHORIONIC HEMATOMA IN FIRST TRIMESTER, SINGLE OR UNSPECIFIED FETUS: ICD-10-CM

## 2023-09-26 DIAGNOSIS — Z34.93 PRENATAL CARE IN THIRD TRIMESTER: Primary | ICD-10-CM

## 2023-09-26 DIAGNOSIS — Z59.9 FINANCIAL DIFFICULTIES: ICD-10-CM

## 2023-09-26 DIAGNOSIS — O46.8X1 SUBCHORIONIC HEMATOMA IN FIRST TRIMESTER, SINGLE OR UNSPECIFIED FETUS: ICD-10-CM

## 2023-09-26 DIAGNOSIS — O44.03 PLACENTA PREVIA IN THIRD TRIMESTER: ICD-10-CM

## 2023-09-26 PROCEDURE — 1036F TOBACCO NON-USER: CPT | Performed by: OBSTETRICS & GYNECOLOGY

## 2023-09-26 PROCEDURE — G8427 DOCREV CUR MEDS BY ELIG CLIN: HCPCS | Performed by: OBSTETRICS & GYNECOLOGY

## 2023-09-26 PROCEDURE — 99213 OFFICE O/P EST LOW 20 MIN: CPT | Performed by: OBSTETRICS & GYNECOLOGY

## 2023-09-26 PROCEDURE — G8417 CALC BMI ABV UP PARAM F/U: HCPCS | Performed by: OBSTETRICS & GYNECOLOGY

## 2023-09-26 PROCEDURE — 59025 FETAL NON-STRESS TEST: CPT | Performed by: OBSTETRICS & GYNECOLOGY

## 2023-09-26 RX ORDER — LANOLIN ALCOHOL/MO/W.PET/CERES
3 CREAM (GRAM) TOPICAL DAILY
Qty: 30 TABLET | Refills: 0 | Status: SHIPPED | OUTPATIENT
Start: 2023-09-26 | End: 2023-10-26

## 2023-09-26 SDOH — ECONOMIC STABILITY - INCOME SECURITY: PROBLEM RELATED TO HOUSING AND ECONOMIC CIRCUMSTANCES, UNSPECIFIED: Z59.9

## 2023-09-26 NOTE — PROGRESS NOTES
Temp: 97.7  . Maternal emotional well being screening form completed and reviewed with patient. Current score is 0. Patient given referral to [address] [place] [address] ([phone]):  No

## 2023-09-29 ENCOUNTER — ROUTINE PRENATAL (OUTPATIENT)
Dept: OBGYN CLINIC | Age: 33
End: 2023-09-29
Payer: COMMERCIAL

## 2023-09-29 VITALS
SYSTOLIC BLOOD PRESSURE: 110 MMHG | BODY MASS INDEX: 40.51 KG/M2 | HEART RATE: 98 BPM | DIASTOLIC BLOOD PRESSURE: 72 MMHG | WEIGHT: 236 LBS

## 2023-09-29 DIAGNOSIS — Z34.93 PRENATAL CARE IN THIRD TRIMESTER: ICD-10-CM

## 2023-09-29 DIAGNOSIS — O99.213 OBESITY AFFECTING PREGNANCY IN THIRD TRIMESTER: Primary | ICD-10-CM

## 2023-09-29 DIAGNOSIS — O44.03 PLACENTA PREVIA IN THIRD TRIMESTER: ICD-10-CM

## 2023-09-29 DIAGNOSIS — R73.09 BLOOD GLUCOSE ABNORMAL: ICD-10-CM

## 2023-09-29 DIAGNOSIS — Z72.820 POOR SLEEP: ICD-10-CM

## 2023-09-29 PROCEDURE — G8427 DOCREV CUR MEDS BY ELIG CLIN: HCPCS | Performed by: OBSTETRICS & GYNECOLOGY

## 2023-09-29 PROCEDURE — 1036F TOBACCO NON-USER: CPT | Performed by: OBSTETRICS & GYNECOLOGY

## 2023-09-29 PROCEDURE — G8417 CALC BMI ABV UP PARAM F/U: HCPCS | Performed by: OBSTETRICS & GYNECOLOGY

## 2023-09-29 PROCEDURE — 99213 OFFICE O/P EST LOW 20 MIN: CPT | Performed by: OBSTETRICS & GYNECOLOGY

## 2023-09-29 RX ORDER — ONDANSETRON 4 MG/1
4 TABLET, ORALLY DISINTEGRATING ORAL 3 TIMES DAILY PRN
Qty: 60 TABLET | Refills: 0 | Status: SHIPPED | OUTPATIENT
Start: 2023-09-29

## 2023-10-02 ENCOUNTER — ROUTINE PRENATAL (OUTPATIENT)
Dept: OBGYN CLINIC | Age: 33
End: 2023-10-02
Payer: COMMERCIAL

## 2023-10-02 VITALS
WEIGHT: 236 LBS | DIASTOLIC BLOOD PRESSURE: 85 MMHG | SYSTOLIC BLOOD PRESSURE: 120 MMHG | BODY MASS INDEX: 40.51 KG/M2 | TEMPERATURE: 97.3 F | HEART RATE: 89 BPM

## 2023-10-02 DIAGNOSIS — J45.909 ASTHMA DURING PREGNANCY: ICD-10-CM

## 2023-10-02 DIAGNOSIS — O09.93 HIGH-RISK PREGNANCY IN THIRD TRIMESTER: ICD-10-CM

## 2023-10-02 DIAGNOSIS — Z72.820 POOR SLEEP: ICD-10-CM

## 2023-10-02 DIAGNOSIS — O44.03 PLACENTA PREVIA IN THIRD TRIMESTER: ICD-10-CM

## 2023-10-02 DIAGNOSIS — Z59.9 FINANCIAL DIFFICULTIES: ICD-10-CM

## 2023-10-02 DIAGNOSIS — O99.519 ASTHMA DURING PREGNANCY: ICD-10-CM

## 2023-10-02 DIAGNOSIS — Z98.84 S/P LAPAROSCOPIC SLEEVE GASTRECTOMY: ICD-10-CM

## 2023-10-02 DIAGNOSIS — R73.09 BLOOD GLUCOSE ABNORMAL: ICD-10-CM

## 2023-10-02 DIAGNOSIS — Z34.93 PRENATAL CARE IN THIRD TRIMESTER: Primary | ICD-10-CM

## 2023-10-02 DIAGNOSIS — O99.213 SEVERE OBESITY DUE TO EXCESS CALORIES AFFECTING PREGNANCY IN THIRD TRIMESTER (HCC): ICD-10-CM

## 2023-10-02 DIAGNOSIS — O24.414 INSULIN CONTROLLED GESTATIONAL DIABETES MELLITUS (GDM) IN THIRD TRIMESTER: ICD-10-CM

## 2023-10-02 DIAGNOSIS — E66.01 SEVERE OBESITY DUE TO EXCESS CALORIES AFFECTING PREGNANCY IN THIRD TRIMESTER (HCC): ICD-10-CM

## 2023-10-02 DIAGNOSIS — O36.63X0 MACROSOMIA OF FETUS AFFECTING MANAGEMENT OF MOTHER IN THIRD TRIMESTER, SINGLE OR UNSPECIFIED FETUS: ICD-10-CM

## 2023-10-02 DIAGNOSIS — O44.40 LOW-LYING PLACENTA: ICD-10-CM

## 2023-10-02 PROCEDURE — 59025 FETAL NON-STRESS TEST: CPT | Performed by: OBSTETRICS & GYNECOLOGY

## 2023-10-02 PROCEDURE — 1036F TOBACCO NON-USER: CPT | Performed by: OBSTETRICS & GYNECOLOGY

## 2023-10-02 PROCEDURE — G8417 CALC BMI ABV UP PARAM F/U: HCPCS | Performed by: OBSTETRICS & GYNECOLOGY

## 2023-10-02 PROCEDURE — G8484 FLU IMMUNIZE NO ADMIN: HCPCS | Performed by: OBSTETRICS & GYNECOLOGY

## 2023-10-02 PROCEDURE — 99213 OFFICE O/P EST LOW 20 MIN: CPT | Performed by: OBSTETRICS & GYNECOLOGY

## 2023-10-02 PROCEDURE — G8427 DOCREV CUR MEDS BY ELIG CLIN: HCPCS | Performed by: OBSTETRICS & GYNECOLOGY

## 2023-10-02 RX ORDER — INSULIN HUMAN 100 [IU]/ML
4 INJECTION, SUSPENSION SUBCUTANEOUS EVERY MORNING
Qty: 5 ADJUSTABLE DOSE PRE-FILLED PEN SYRINGE | Refills: 3 | Status: SHIPPED | OUTPATIENT
Start: 2023-10-02 | End: 2023-10-02

## 2023-10-02 SDOH — ECONOMIC STABILITY - INCOME SECURITY: PROBLEM RELATED TO HOUSING AND ECONOMIC CIRCUMSTANCES, UNSPECIFIED: Z59.9

## 2023-10-03 ENCOUNTER — TELEPHONE (OUTPATIENT)
Dept: OBGYN CLINIC | Age: 33
End: 2023-10-03

## 2023-10-03 NOTE — TELEPHONE ENCOUNTER
Not sure which insulin to run the PA for. Humulin or Novolin. [employer] usually prefer brand name products as well. Tried to reach pharmacy x 2 to confirm which product is being ran through in order to place PA for correct drug. Was disconnected after long hold times. If this requires a response please respond to the pool ( P [facility] [phone]). Thank you please advise patient.

## 2023-10-03 NOTE — TELEPHONE ENCOUNTER
Medication needing PA: Insulin NPH (Human) (Isophane) 100 UNIT/ML Suspension Pen-injector (HUMULIN N;NOVOLIN N)    Diagnosis Code:  Insulin controlled gestational diabetes mellitus (GDM) in third trimester -O24.414

## 2023-10-04 NOTE — TELEPHONE ENCOUNTER
Pt calling to check status of PA for insulin. Informed of note above regarding status. Pt reports her fsbs after breakfast was 127 and after lunch was 212.  Please advise

## 2023-10-04 NOTE — TELEPHONE ENCOUNTER
Submitted PA for HumuLIN N KwikPen 100UNIT/ML pen-injectors  Via CMM Key: [id number] STATUS: PENDING. Follow up done daily; if no response in three days we will refax for status check. If another three days goes by with no response we will call the insurance for status.

## 2023-10-05 NOTE — TELEPHONE ENCOUNTER
The medication is APPROVED. If this requires a response please respond to the pool ( P [facility] [phone]). Thank you please advise patient.

## 2023-10-05 NOTE — TELEPHONE ENCOUNTER
This patient needs NPH insulin as soon as possible.    Please take lead on this and figure out what will be covered for her so we can start treating this ASAP     [facility]

## 2023-10-06 ENCOUNTER — ROUTINE PRENATAL (OUTPATIENT)
Dept: OBGYN CLINIC | Age: 33
End: 2023-10-06
Payer: COMMERCIAL

## 2023-10-06 VITALS
BODY MASS INDEX: 41.02 KG/M2 | DIASTOLIC BLOOD PRESSURE: 64 MMHG | WEIGHT: 239 LBS | SYSTOLIC BLOOD PRESSURE: 100 MMHG | HEART RATE: 110 BPM

## 2023-10-06 DIAGNOSIS — Z34.93 PRENATAL CARE IN THIRD TRIMESTER: Primary | ICD-10-CM

## 2023-10-06 DIAGNOSIS — O36.63X0 MACROSOMIA OF FETUS AFFECTING MANAGEMENT OF MOTHER IN THIRD TRIMESTER, SINGLE OR UNSPECIFIED FETUS: ICD-10-CM

## 2023-10-06 DIAGNOSIS — Z59.9 FINANCIAL DIFFICULTIES: ICD-10-CM

## 2023-10-06 DIAGNOSIS — O09.93 HIGH-RISK PREGNANCY IN THIRD TRIMESTER: ICD-10-CM

## 2023-10-06 DIAGNOSIS — O24.414 INSULIN CONTROLLED GESTATIONAL DIABETES MELLITUS (GDM) IN THIRD TRIMESTER: ICD-10-CM

## 2023-10-06 DIAGNOSIS — O99.519 ASTHMA DURING PREGNANCY: ICD-10-CM

## 2023-10-06 DIAGNOSIS — J45.909 ASTHMA DURING PREGNANCY: ICD-10-CM

## 2023-10-06 PROCEDURE — G8427 DOCREV CUR MEDS BY ELIG CLIN: HCPCS | Performed by: OBSTETRICS & GYNECOLOGY

## 2023-10-06 PROCEDURE — 99213 OFFICE O/P EST LOW 20 MIN: CPT | Performed by: OBSTETRICS & GYNECOLOGY

## 2023-10-06 PROCEDURE — G8417 CALC BMI ABV UP PARAM F/U: HCPCS | Performed by: OBSTETRICS & GYNECOLOGY

## 2023-10-06 PROCEDURE — G8484 FLU IMMUNIZE NO ADMIN: HCPCS | Performed by: OBSTETRICS & GYNECOLOGY

## 2023-10-06 PROCEDURE — 1036F TOBACCO NON-USER: CPT | Performed by: OBSTETRICS & GYNECOLOGY

## 2023-10-06 SDOH — ECONOMIC STABILITY - INCOME SECURITY: PROBLEM RELATED TO HOUSING AND ECONOMIC CIRCUMSTANCES, UNSPECIFIED: Z59.9

## 2023-10-09 DIAGNOSIS — J45.20 MILD INTERMITTENT ASTHMA WITHOUT COMPLICATION: ICD-10-CM

## 2023-10-09 DIAGNOSIS — J45.40 MODERATE PERSISTENT ASTHMA WITHOUT COMPLICATION: Primary | ICD-10-CM

## 2023-10-09 NOTE — TELEPHONE ENCOUNTER
Patient needs nebulizer solution filled she has not had to get in a long time because she got an abundance of it from a relative.

## 2023-10-10 ENCOUNTER — ROUTINE PRENATAL (OUTPATIENT)
Dept: OBGYN CLINIC | Age: 33
End: 2023-10-10

## 2023-10-10 VITALS
SYSTOLIC BLOOD PRESSURE: 122 MMHG | DIASTOLIC BLOOD PRESSURE: 70 MMHG | HEART RATE: 101 BPM | TEMPERATURE: 97.4 F | BODY MASS INDEX: 40.78 KG/M2 | WEIGHT: 237.6 LBS

## 2023-10-10 DIAGNOSIS — O36.63X0 MACROSOMIA OF FETUS AFFECTING MANAGEMENT OF MOTHER IN THIRD TRIMESTER, SINGLE OR UNSPECIFIED FETUS: ICD-10-CM

## 2023-10-10 DIAGNOSIS — J45.909 ASTHMA COMPLICATING PREGNANCY IN THIRD TRIMESTER: ICD-10-CM

## 2023-10-10 DIAGNOSIS — Z59.9 FINANCIAL DIFFICULTIES: ICD-10-CM

## 2023-10-10 DIAGNOSIS — O99.019 ANEMIA DURING PREGNANCY: ICD-10-CM

## 2023-10-10 DIAGNOSIS — Z34.93 PRENATAL CARE IN THIRD TRIMESTER: Primary | ICD-10-CM

## 2023-10-10 DIAGNOSIS — O44.03 PLACENTA PREVIA IN THIRD TRIMESTER: ICD-10-CM

## 2023-10-10 DIAGNOSIS — E66.01 SEVERE OBESITY DUE TO EXCESS CALORIES AFFECTING PREGNANCY IN THIRD TRIMESTER (HCC): ICD-10-CM

## 2023-10-10 DIAGNOSIS — O99.213 SEVERE OBESITY DUE TO EXCESS CALORIES AFFECTING PREGNANCY IN THIRD TRIMESTER (HCC): ICD-10-CM

## 2023-10-10 DIAGNOSIS — O24.414 INSULIN CONTROLLED GESTATIONAL DIABETES MELLITUS (GDM) IN THIRD TRIMESTER: ICD-10-CM

## 2023-10-10 DIAGNOSIS — Z98.84 S/P LAPAROSCOPIC SLEEVE GASTRECTOMY: ICD-10-CM

## 2023-10-10 DIAGNOSIS — O99.513 ASTHMA COMPLICATING PREGNANCY IN THIRD TRIMESTER: ICD-10-CM

## 2023-10-10 DIAGNOSIS — I47.9 PAROXYSMAL TACHYCARDIA (HCC): ICD-10-CM

## 2023-10-10 PROBLEM — Z00.8 NUTRITIONAL ASSESSMENT: Status: RESOLVED | Noted: 2023-09-10 | Resolved: 2023-10-10

## 2023-10-10 RX ORDER — NEBULIZER ACCESSORIES
1 KIT MISCELLANEOUS DAILY PRN
Qty: 1 KIT | Refills: 0 | Status: SHIPPED | OUTPATIENT
Start: 2023-10-10

## 2023-10-10 RX ORDER — ALBUTEROL SULFATE 2.5 MG/3ML
2.5 SOLUTION RESPIRATORY (INHALATION) EVERY 4 HOURS PRN
Qty: 120 EACH | Refills: 3 | Status: SHIPPED | OUTPATIENT
Start: 2023-10-10

## 2023-10-10 SDOH — ECONOMIC STABILITY - INCOME SECURITY: PROBLEM RELATED TO HOUSING AND ECONOMIC CIRCUMSTANCES, UNSPECIFIED: Z59.9

## 2023-10-13 ENCOUNTER — ROUTINE PRENATAL (OUTPATIENT)
Dept: OBGYN CLINIC | Age: 33
End: 2023-10-13

## 2023-10-13 VITALS
DIASTOLIC BLOOD PRESSURE: 70 MMHG | BODY MASS INDEX: 41.13 KG/M2 | HEART RATE: 112 BPM | WEIGHT: 239.6 LBS | SYSTOLIC BLOOD PRESSURE: 100 MMHG

## 2023-10-13 DIAGNOSIS — O99.013 ANEMIA DURING PREGNANCY IN THIRD TRIMESTER: ICD-10-CM

## 2023-10-13 DIAGNOSIS — O99.513 ASTHMA COMPLICATING PREGNANCY IN THIRD TRIMESTER: ICD-10-CM

## 2023-10-13 DIAGNOSIS — O46.8X1 SUBCHORIONIC HEMATOMA IN FIRST TRIMESTER, SINGLE OR UNSPECIFIED FETUS: ICD-10-CM

## 2023-10-13 DIAGNOSIS — K66.0 INTRA-ABDOMINAL ADHESIONS: ICD-10-CM

## 2023-10-13 DIAGNOSIS — Z98.84 S/P LAPAROSCOPIC SLEEVE GASTRECTOMY: ICD-10-CM

## 2023-10-13 DIAGNOSIS — O99.213 SEVERE OBESITY DUE TO EXCESS CALORIES AFFECTING PREGNANCY IN THIRD TRIMESTER (HCC): ICD-10-CM

## 2023-10-13 DIAGNOSIS — E66.01 SEVERE OBESITY DUE TO EXCESS CALORIES AFFECTING PREGNANCY IN THIRD TRIMESTER (HCC): ICD-10-CM

## 2023-10-13 DIAGNOSIS — J45.909 ASTHMA COMPLICATING PREGNANCY IN THIRD TRIMESTER: ICD-10-CM

## 2023-10-13 DIAGNOSIS — O41.8X10 SUBCHORIONIC HEMATOMA IN FIRST TRIMESTER, SINGLE OR UNSPECIFIED FETUS: ICD-10-CM

## 2023-10-13 DIAGNOSIS — Z80.3 FAMILY HISTORY OF BREAST CANCER: ICD-10-CM

## 2023-10-13 DIAGNOSIS — O24.414 INSULIN CONTROLLED GESTATIONAL DIABETES MELLITUS (GDM) IN THIRD TRIMESTER: ICD-10-CM

## 2023-10-13 DIAGNOSIS — Z34.93 PRENATAL CARE IN THIRD TRIMESTER: Primary | ICD-10-CM

## 2023-10-13 DIAGNOSIS — Z90.721 S/P UNILATERAL SALPINGO-OOPHORECTOMY: ICD-10-CM

## 2023-10-15 PROBLEM — Z80.3 FAMILY HISTORY OF BREAST CANCER: Status: ACTIVE | Noted: 2023-10-15

## 2023-10-15 PROBLEM — O99.013 ANEMIA DURING PREGNANCY IN THIRD TRIMESTER: Status: ACTIVE | Noted: 2023-10-15

## 2023-10-15 PROBLEM — O99.513 ASTHMA COMPLICATING PREGNANCY IN THIRD TRIMESTER: Status: ACTIVE | Noted: 2023-04-04

## 2023-10-15 PROBLEM — O36.63X0 MACROSOMIA AFFECTING MANAGEMENT OF MOTHER IN THIRD TRIMESTER: Status: ACTIVE | Noted: 2023-10-15

## 2023-10-15 PROBLEM — Z34.93 PRENATAL CARE IN THIRD TRIMESTER: Status: RESOLVED | Noted: 2020-10-05 | Resolved: 2023-10-15

## 2023-10-15 PROBLEM — Z34.93 PRENATAL CARE IN THIRD TRIMESTER: Status: ACTIVE | Noted: 2023-04-04

## 2023-10-15 RX ORDER — FAMOTIDINE 20 MG/1
20 TABLET, FILM COATED ORAL 2 TIMES DAILY
Qty: 60 TABLET | Refills: 1 | Status: SHIPPED | OUTPATIENT
Start: 2023-10-15

## 2023-10-16 ENCOUNTER — ROUTINE PRENATAL (OUTPATIENT)
Dept: OBGYN CLINIC | Age: 33
End: 2023-10-16
Payer: COMMERCIAL

## 2023-10-16 VITALS
HEART RATE: 100 BPM | SYSTOLIC BLOOD PRESSURE: 116 MMHG | DIASTOLIC BLOOD PRESSURE: 72 MMHG | WEIGHT: 239.6 LBS | BODY MASS INDEX: 41.13 KG/M2

## 2023-10-16 DIAGNOSIS — J45.909 ASTHMA DURING PREGNANCY: ICD-10-CM

## 2023-10-16 DIAGNOSIS — Z34.93 PRENATAL CARE IN THIRD TRIMESTER: Primary | ICD-10-CM

## 2023-10-16 DIAGNOSIS — O24.414 INSULIN CONTROLLED GESTATIONAL DIABETES MELLITUS (GDM) IN THIRD TRIMESTER: ICD-10-CM

## 2023-10-16 DIAGNOSIS — J45.909 ASTHMA COMPLICATING PREGNANCY IN THIRD TRIMESTER: ICD-10-CM

## 2023-10-16 DIAGNOSIS — Z80.3 FAMILY HISTORY OF BREAST CANCER: ICD-10-CM

## 2023-10-16 DIAGNOSIS — O99.013 ANEMIA DURING PREGNANCY IN THIRD TRIMESTER: ICD-10-CM

## 2023-10-16 DIAGNOSIS — O99.213 SEVERE OBESITY DUE TO EXCESS CALORIES AFFECTING PREGNANCY IN THIRD TRIMESTER (HCC): ICD-10-CM

## 2023-10-16 DIAGNOSIS — Z90.721 S/P UNILATERAL SALPINGO-OOPHORECTOMY: ICD-10-CM

## 2023-10-16 DIAGNOSIS — O36.63X0 MACROSOMIA OF FETUS AFFECTING MANAGEMENT OF MOTHER IN THIRD TRIMESTER, SINGLE OR UNSPECIFIED FETUS: ICD-10-CM

## 2023-10-16 DIAGNOSIS — Z98.84 S/P LAPAROSCOPIC SLEEVE GASTRECTOMY: ICD-10-CM

## 2023-10-16 DIAGNOSIS — O99.513 ASTHMA COMPLICATING PREGNANCY IN THIRD TRIMESTER: ICD-10-CM

## 2023-10-16 DIAGNOSIS — O99.519 ASTHMA DURING PREGNANCY: ICD-10-CM

## 2023-10-16 DIAGNOSIS — E66.01 SEVERE OBESITY DUE TO EXCESS CALORIES AFFECTING PREGNANCY IN THIRD TRIMESTER (HCC): ICD-10-CM

## 2023-10-16 PROCEDURE — 99213 OFFICE O/P EST LOW 20 MIN: CPT | Performed by: OBSTETRICS & GYNECOLOGY

## 2023-10-16 PROCEDURE — G8417 CALC BMI ABV UP PARAM F/U: HCPCS | Performed by: OBSTETRICS & GYNECOLOGY

## 2023-10-16 PROCEDURE — 59025 FETAL NON-STRESS TEST: CPT | Performed by: OBSTETRICS & GYNECOLOGY

## 2023-10-16 PROCEDURE — G8427 DOCREV CUR MEDS BY ELIG CLIN: HCPCS | Performed by: OBSTETRICS & GYNECOLOGY

## 2023-10-16 PROCEDURE — 1036F TOBACCO NON-USER: CPT | Performed by: OBSTETRICS & GYNECOLOGY

## 2023-10-16 PROCEDURE — G8484 FLU IMMUNIZE NO ADMIN: HCPCS | Performed by: OBSTETRICS & GYNECOLOGY

## 2023-10-17 NOTE — PROGRESS NOTES
36 y/o  female at 35 weeks 5 days gestation with [facility] 11/15/23 who presents for prenatal visit and NST  Pregnancy is complicated by low lying placenta (resolved), history of sleeve gastrectomy, history of unilateral salpingo-oophorectomy during 3rd pregnancy, insulin controlled gestational diabetes (takes 4 units NPH in AM), suspected macrosomia, family history of breast cancer (maternal aunt Janee--breast cancer, maternal aunt--breast cancer with mets to spine) and asthma. Glucose levels:   Fastin-90  1 hour postprandial breakfast: 140-160  1 hour postprandial lunch:   1 hour postprandial dinner: 120-160    Vaginal delivery is anticipated. Patient would like to have ovarian cancer risk reducing unilateral salpingectomy if  is necessary. Medicaid sterilization form completed. Ethics has not been contacted yet. Planning on postpartum vasectomy. Presentation has been variable--vertex today, footling breech 2 weeks ago. EFW:   23: 786 g, 93.3%  23: 1323 g, 80.4%  23: 1908 g, 91.5%  10/13/23: 3011 g, 84.2%    Denies vaginal bleeding, loss of fluid, regular contractions and decreased fetal movement. Admits to a couple of contractions here and there. Admits to some headaches  Denies vision changes and RUQ pain. Admits to baseline shortness of breath with asthma (stable) and heartburn  Denies fever, chills, chest pain, nausea, vomiting, diarrhea, constipation, dysuria and hematuria. Maternal Wellness Questionnaire reviewed--no concerns. NON STRESS TEST    DATE: 10/16/23    : Venancio Huang. PATRICIO Sevilla.    COMPARISON: none    INDICATION: gestational diabetes--insulin controlled    IMPRESSION:  Fetal heart tones: 125,  NST category: 1, reactive,  Tocometry: irritability, occasional contractions. Diagnosis Orders   1. Prenatal care in third trimester        2. Severe obesity due to excess calories affecting pregnancy in third trimester ([address])        3.  S/P

## 2023-10-20 ENCOUNTER — ROUTINE PRENATAL (OUTPATIENT)
Dept: OBGYN CLINIC | Age: 33
End: 2023-10-20

## 2023-10-20 VITALS
BODY MASS INDEX: 41.37 KG/M2 | DIASTOLIC BLOOD PRESSURE: 74 MMHG | SYSTOLIC BLOOD PRESSURE: 114 MMHG | WEIGHT: 241 LBS | TEMPERATURE: 97.5 F | HEART RATE: 95 BPM

## 2023-10-20 DIAGNOSIS — O99.013 ANEMIA DURING PREGNANCY IN THIRD TRIMESTER: ICD-10-CM

## 2023-10-20 DIAGNOSIS — J45.909 ASTHMA DURING PREGNANCY: ICD-10-CM

## 2023-10-20 DIAGNOSIS — O36.63X0 MACROSOMIA OF FETUS AFFECTING MANAGEMENT OF MOTHER IN THIRD TRIMESTER, SINGLE OR UNSPECIFIED FETUS: ICD-10-CM

## 2023-10-20 DIAGNOSIS — O99.519 ASTHMA DURING PREGNANCY: ICD-10-CM

## 2023-10-20 DIAGNOSIS — O44.03 PLACENTA PREVIA IN THIRD TRIMESTER: ICD-10-CM

## 2023-10-20 DIAGNOSIS — I47.9 PAROXYSMAL TACHYCARDIA (HCC): ICD-10-CM

## 2023-10-20 DIAGNOSIS — Z34.93 PRENATAL CARE IN THIRD TRIMESTER: Primary | ICD-10-CM

## 2023-10-20 DIAGNOSIS — O99.213 MATERNAL OBESITY SYNDROME IN THIRD TRIMESTER: ICD-10-CM

## 2023-10-20 DIAGNOSIS — Z98.84 S/P LAPAROSCOPIC SLEEVE GASTRECTOMY: ICD-10-CM

## 2023-10-20 DIAGNOSIS — O24.414 INSULIN CONTROLLED GESTATIONAL DIABETES MELLITUS (GDM) IN THIRD TRIMESTER: ICD-10-CM

## 2023-10-20 DIAGNOSIS — Z59.9 FINANCIAL DIFFICULTIES: ICD-10-CM

## 2023-10-20 LAB — GBS, EXTERNAL RESULT: NEGATIVE

## 2023-10-20 SDOH — ECONOMIC STABILITY - INCOME SECURITY: PROBLEM RELATED TO HOUSING AND ECONOMIC CIRCUMSTANCES, UNSPECIFIED: Z59.9

## 2023-10-22 ENCOUNTER — TELEPHONE (OUTPATIENT)
Dept: OBGYN | Age: 33
End: 2023-10-22

## 2023-10-22 LAB — GP B STREP SPEC QL CULT: NORMAL

## 2023-10-23 ENCOUNTER — ROUTINE PRENATAL (OUTPATIENT)
Dept: OBGYN CLINIC | Age: 33
End: 2023-10-23
Payer: COMMERCIAL

## 2023-10-23 VITALS
SYSTOLIC BLOOD PRESSURE: 124 MMHG | BODY MASS INDEX: 41.68 KG/M2 | HEART RATE: 108 BPM | DIASTOLIC BLOOD PRESSURE: 74 MMHG | WEIGHT: 242.8 LBS

## 2023-10-23 DIAGNOSIS — Z98.84 S/P LAPAROSCOPIC SLEEVE GASTRECTOMY: ICD-10-CM

## 2023-10-23 DIAGNOSIS — Z59.9 FINANCIAL DIFFICULTIES: ICD-10-CM

## 2023-10-23 DIAGNOSIS — O99.513 ASTHMA COMPLICATING PREGNANCY IN THIRD TRIMESTER: ICD-10-CM

## 2023-10-23 DIAGNOSIS — O99.013 ANEMIA DURING PREGNANCY IN THIRD TRIMESTER: ICD-10-CM

## 2023-10-23 DIAGNOSIS — O24.414 INSULIN CONTROLLED GESTATIONAL DIABETES MELLITUS (GDM) IN THIRD TRIMESTER: ICD-10-CM

## 2023-10-23 DIAGNOSIS — R30.0 DYSURIA DURING PREGNANCY IN THIRD TRIMESTER: ICD-10-CM

## 2023-10-23 DIAGNOSIS — Z80.3 FAMILY HISTORY OF BREAST CANCER: ICD-10-CM

## 2023-10-23 DIAGNOSIS — Z90.721 S/P UNILATERAL SALPINGO-OOPHORECTOMY: ICD-10-CM

## 2023-10-23 DIAGNOSIS — O26.893 DYSURIA DURING PREGNANCY IN THIRD TRIMESTER: ICD-10-CM

## 2023-10-23 DIAGNOSIS — O36.63X0 MACROSOMIA OF FETUS AFFECTING MANAGEMENT OF MOTHER IN THIRD TRIMESTER, SINGLE OR UNSPECIFIED FETUS: ICD-10-CM

## 2023-10-23 DIAGNOSIS — Z34.93 PRENATAL CARE IN THIRD TRIMESTER: Primary | ICD-10-CM

## 2023-10-23 DIAGNOSIS — J45.909 ASTHMA COMPLICATING PREGNANCY IN THIRD TRIMESTER: ICD-10-CM

## 2023-10-23 PROCEDURE — G8417 CALC BMI ABV UP PARAM F/U: HCPCS | Performed by: OBSTETRICS & GYNECOLOGY

## 2023-10-23 PROCEDURE — G8484 FLU IMMUNIZE NO ADMIN: HCPCS | Performed by: OBSTETRICS & GYNECOLOGY

## 2023-10-23 PROCEDURE — G8427 DOCREV CUR MEDS BY ELIG CLIN: HCPCS | Performed by: OBSTETRICS & GYNECOLOGY

## 2023-10-23 PROCEDURE — 1036F TOBACCO NON-USER: CPT | Performed by: OBSTETRICS & GYNECOLOGY

## 2023-10-23 PROCEDURE — 99213 OFFICE O/P EST LOW 20 MIN: CPT | Performed by: OBSTETRICS & GYNECOLOGY

## 2023-10-23 SDOH — ECONOMIC STABILITY - INCOME SECURITY: PROBLEM RELATED TO HOUSING AND ECONOMIC CIRCUMSTANCES, UNSPECIFIED: Z59.9

## 2023-10-24 LAB
BACTERIA URNS QL MICRO: ABNORMAL /HPF
BILIRUB UR QL STRIP.AUTO: NEGATIVE
CLARITY UR: ABNORMAL
COLOR UR: YELLOW
EPI CELLS #/AREA URNS AUTO: 11 /HPF (ref 0–5)
GLUCOSE UR STRIP.AUTO-MCNC: 100 MG/DL
HGB UR QL STRIP.AUTO: NEGATIVE
HYALINE CASTS #/AREA URNS AUTO: 4 /LPF (ref 0–8)
KETONES UR STRIP.AUTO-MCNC: NEGATIVE MG/DL
LEUKOCYTE ESTERASE UR QL STRIP.AUTO: ABNORMAL
NITRITE UR QL STRIP.AUTO: NEGATIVE
PH UR STRIP.AUTO: 6 [PH] (ref 5–8)
PROT UR STRIP.AUTO-MCNC: NEGATIVE MG/DL
RBC CLUMPS #/AREA URNS AUTO: 1 /HPF (ref 0–4)
SP GR UR STRIP.AUTO: 1.02 (ref 1–1.03)
UA DIPSTICK W REFLEX MICRO PNL UR: YES
URN SPEC COLLECT METH UR: ABNORMAL
UROBILINOGEN UR STRIP-ACNC: 0.2 E.U./DL
WBC #/AREA URNS AUTO: 2 /HPF (ref 0–5)

## 2023-10-24 RX ORDER — LORATADINE 10 MG
TABLET ORAL
Qty: 120 EACH | Refills: 1 | Status: SHIPPED | OUTPATIENT
Start: 2023-10-24

## 2023-10-25 LAB — BACTERIA UR CULT: NORMAL

## 2023-10-26 ENCOUNTER — ROUTINE PRENATAL (OUTPATIENT)
Dept: OBGYN CLINIC | Age: 33
End: 2023-10-26

## 2023-10-26 DIAGNOSIS — E66.9 OBESITY, UNSPECIFIED CLASSIFICATION, UNSPECIFIED OBESITY TYPE, UNSPECIFIED WHETHER SERIOUS COMORBIDITY PRESENT: ICD-10-CM

## 2023-10-26 DIAGNOSIS — O32.0XX0 UNSTABLE FETAL LIE, SINGLE OR UNSPECIFIED FETUS: ICD-10-CM

## 2023-10-26 DIAGNOSIS — Z72.820 POOR SLEEP: ICD-10-CM

## 2023-10-26 DIAGNOSIS — Z34.93 PRENATAL CARE IN THIRD TRIMESTER: Primary | ICD-10-CM

## 2023-10-26 DIAGNOSIS — O24.414 INSULIN CONTROLLED GESTATIONAL DIABETES MELLITUS (GDM) IN THIRD TRIMESTER: ICD-10-CM

## 2023-10-26 DIAGNOSIS — O44.40 LOW-LYING PLACENTA: ICD-10-CM

## 2023-10-26 NOTE — PROGRESS NOTES
35 y.o. [id number] at 37w1d EGA Estimated Date of Delivery: 11/15/23 here for HUMERA:     Pt seen and examined. No concerns/complaints. Denies VB, LOF, CTX. Endorses (+) FM. Denies fevers / chills / chest pain / shortness of breath. Denies HA, changes with vision, RUQ pain, edema. Her fingersticks were reviewed and noted to be elevated. Postprandials were still elevated. She was just increased at her last office visit. MWQ reviewed. Objective:  LMP  (LMP Unknown)   Gen: AO, NAD  Abd: Soft, NT, gravid   Ext: Mild LE edema    OB ULTRASOUND:  BIOPHYSICAL PROFILE     DATE: 10/26/23     PHYSICIAN: Bhupinder Estrada D.O.     SONOGRAPHER: SHAKEEL Regalado RDMS     INDICATION: Fetal well being     TYPE OF SCAN: abdominal     BPP: 8/8  Tone: 2, Gross fetal movement: 2, Breathin, Fluid: 2     FINDINGS:  A single viable intrauterine pregnancy is noted in Breech presentation. Cardiac and somatic activity are noted. The following values were obtained:              Fetal heart rate 144bpm              Amniotic fluid index 17.75cm     IMPRESSION:   Single live IUP in the third trimester. BPP 8/8. THEE 17.75cm. Imaging is limited secondary to fetal position. The patient is well aware of the limitations of ultrasound in the detection of anomalies. Assessment/Plan:   Diagnosis Orders   1. Prenatal care in third trimester        2. Insulin controlled gestational diabetes mellitus (GDM) in third trimester        3. Unstable fetal lie, single or unspecified fetus        4. Obesity, unspecified classification, unspecified obesity type, unspecified whether serious comorbidity present        5. Low-lying placenta            Diagnosis Orders   1.  Prenatal care in third trimester     - reassuring fetal status today  - Genetic screening: [id number] neg, it's a boy!  - Anatomy 23: 256g, nl anatomy, anterior low-lying placenta (1.4cm), nl fluid, CL 4.3cm  - Tdap: 28 weeks  - PNL: A+/ab-, RI, HepB/C neg, RPRnr, HIV neg, VZV

## 2023-10-30 RX ORDER — LANOLIN ALCOHOL/MO/W.PET/CERES
1 CREAM (GRAM) TOPICAL DAILY
Qty: 30 TABLET | Refills: 0 | OUTPATIENT
Start: 2023-10-30

## 2023-10-30 NOTE — PROGRESS NOTES
weeks  Biweekly fetal monitoring  Follow up prn and 3-4 days for prenatal visit and fetal monitoring.

## 2023-10-31 ENCOUNTER — ROUTINE PRENATAL (OUTPATIENT)
Dept: OBGYN CLINIC | Age: 33
End: 2023-10-31
Payer: COMMERCIAL

## 2023-10-31 VITALS
DIASTOLIC BLOOD PRESSURE: 72 MMHG | WEIGHT: 242.2 LBS | SYSTOLIC BLOOD PRESSURE: 120 MMHG | HEART RATE: 100 BPM | BODY MASS INDEX: 41.57 KG/M2

## 2023-10-31 DIAGNOSIS — Z34.93 PRENATAL CARE IN THIRD TRIMESTER: Primary | ICD-10-CM

## 2023-10-31 DIAGNOSIS — O24.414 INSULIN CONTROLLED GESTATIONAL DIABETES MELLITUS (GDM) IN THIRD TRIMESTER: ICD-10-CM

## 2023-10-31 DIAGNOSIS — O44.02 PLACENTA PREVIA IN SECOND TRIMESTER: ICD-10-CM

## 2023-10-31 DIAGNOSIS — O99.013 ANEMIA DURING PREGNANCY IN THIRD TRIMESTER: ICD-10-CM

## 2023-10-31 DIAGNOSIS — E66.9 OBESITY, UNSPECIFIED CLASSIFICATION, UNSPECIFIED OBESITY TYPE, UNSPECIFIED WHETHER SERIOUS COMORBIDITY PRESENT: ICD-10-CM

## 2023-10-31 DIAGNOSIS — O36.63X0 MACROSOMIA OF FETUS AFFECTING MANAGEMENT OF MOTHER IN THIRD TRIMESTER, SINGLE OR UNSPECIFIED FETUS: ICD-10-CM

## 2023-10-31 DIAGNOSIS — O32.0XX0 UNSTABLE FETAL LIE, SINGLE OR UNSPECIFIED FETUS: ICD-10-CM

## 2023-10-31 PROCEDURE — G8417 CALC BMI ABV UP PARAM F/U: HCPCS | Performed by: OBSTETRICS & GYNECOLOGY

## 2023-10-31 PROCEDURE — 99214 OFFICE O/P EST MOD 30 MIN: CPT | Performed by: OBSTETRICS & GYNECOLOGY

## 2023-10-31 PROCEDURE — G8484 FLU IMMUNIZE NO ADMIN: HCPCS | Performed by: OBSTETRICS & GYNECOLOGY

## 2023-10-31 PROCEDURE — G8428 CUR MEDS NOT DOCUMENT: HCPCS | Performed by: OBSTETRICS & GYNECOLOGY

## 2023-10-31 PROCEDURE — 1036F TOBACCO NON-USER: CPT | Performed by: OBSTETRICS & GYNECOLOGY

## 2023-11-01 ENCOUNTER — APPOINTMENT (OUTPATIENT)
Dept: LABOR AND DELIVERY | Age: 33
DRG: 560 | End: 2023-11-01
Payer: COMMERCIAL

## 2023-11-01 ENCOUNTER — HOSPITAL ENCOUNTER (INPATIENT)
Age: 33
LOS: 4 days | Discharge: HOME | DRG: 560 | End: 2023-11-05
Attending: OBSTETRICS & GYNECOLOGY | Admitting: OBSTETRICS & GYNECOLOGY
Payer: COMMERCIAL

## 2023-11-01 DIAGNOSIS — D50.9 IRON DEFICIENCY ANEMIA, UNSPECIFIED IRON DEFICIENCY ANEMIA TYPE: ICD-10-CM

## 2023-11-01 LAB
ABO + RH BLD: NORMAL
AMPHETAMINES UR QL SCN>1000 NG/ML: NORMAL
BARBITURATES UR QL SCN>200 NG/ML: NORMAL
BASOPHILS # BLD: 0 K/UL (ref 0–0.2)
BASOPHILS NFR BLD: 0.4 %
BENZODIAZ UR QL SCN>200 NG/ML: NORMAL
BLD GP AB SCN SERPL QL: NORMAL
BUPRENORPHINE+NOR UR QL SCN: NORMAL
CANNABINOIDS UR QL SCN>50 NG/ML: NORMAL
COCAINE UR QL SCN: NORMAL
DEPRECATED RDW RBC AUTO: 17.1 % (ref 12.4–15.4)
DRUG SCREEN COMMENT UR-IMP: NORMAL
EOSINOPHIL # BLD: 0.2 K/UL (ref 0–0.6)
EOSINOPHIL NFR BLD: 1.4 %
FENTANYL SCREEN, URINE: NORMAL
GLUCOSE BLD-MCNC: 101 MG/DL (ref 70–99)
GLUCOSE BLD-MCNC: 113 MG/DL (ref 70–99)
GLUCOSE BLD-MCNC: 113 MG/DL (ref 70–99)
GLUCOSE BLD-MCNC: 119 MG/DL (ref 70–99)
GLUCOSE BLD-MCNC: 87 MG/DL (ref 70–99)
GLUCOSE BLD-MCNC: 88 MG/DL (ref 70–99)
GLUCOSE BLD-MCNC: 92 MG/DL (ref 70–99)
GLUCOSE BLD-MCNC: 99 MG/DL (ref 70–99)
HCT VFR BLD AUTO: 29.2 % (ref 36–48)
HGB BLD-MCNC: 9.4 G/DL (ref 12–16)
LYMPHOCYTES # BLD: 1.5 K/UL (ref 1–5.1)
LYMPHOCYTES NFR BLD: 13.6 %
MCH RBC QN AUTO: 22.1 PG (ref 26–34)
MCHC RBC AUTO-ENTMCNC: 32.3 G/DL (ref 31–36)
MCV RBC AUTO: 68.3 FL (ref 80–100)
METHADONE UR QL SCN>300 NG/ML: NORMAL
MICROCYTES BLD QL SMEAR: ABNORMAL
MONOCYTES # BLD: 0.5 K/UL (ref 0–1.3)
MONOCYTES NFR BLD: 4.8 %
NEUTROPHILS # BLD: 8.7 K/UL (ref 1.7–7.7)
NEUTROPHILS NFR BLD: 79.8 %
OPIATES UR QL SCN>300 NG/ML: NORMAL
OVALOCYTES BLD QL SMEAR: ABNORMAL
OXYCODONE UR QL SCN: NORMAL
PATH INTERP BLD-IMP: NORMAL
PATH INTERP BLD-IMP: YES
PCP UR QL SCN>25 NG/ML: NORMAL
PERFORMED ON: ABNORMAL
PERFORMED ON: NORMAL
PH UR STRIP: 6 [PH]
PLATELET # BLD AUTO: 208 K/UL (ref 135–450)
PLATELET BLD QL SMEAR: ADEQUATE
PMV BLD AUTO: 9.3 FL (ref 5–10.5)
POLYCHROMASIA BLD QL SMEAR: ABNORMAL
RBC # BLD AUTO: 4.27 M/UL (ref 4–5.2)
SLIDE REVIEW: ABNORMAL
SPHEROCYTES BLD QL SMEAR: ABNORMAL
WBC # BLD AUTO: 10.9 K/UL (ref 4–11)

## 2023-11-01 PROCEDURE — 6370000000 HC RX 637 (ALT 250 FOR IP): Performed by: OBSTETRICS & GYNECOLOGY

## 2023-11-01 PROCEDURE — 83036 HEMOGLOBIN GLYCOSYLATED A1C: CPT

## 2023-11-01 PROCEDURE — 86780 TREPONEMA PALLIDUM: CPT

## 2023-11-01 PROCEDURE — 86900 BLOOD TYPING SEROLOGIC ABO: CPT

## 2023-11-01 PROCEDURE — 1220000000 HC SEMI PRIVATE OB R&B

## 2023-11-01 PROCEDURE — 86850 RBC ANTIBODY SCREEN: CPT

## 2023-11-01 PROCEDURE — 86901 BLOOD TYPING SEROLOGIC RH(D): CPT

## 2023-11-01 PROCEDURE — 80307 DRUG TEST PRSMV CHEM ANLYZR: CPT

## 2023-11-01 PROCEDURE — 85025 COMPLETE CBC W/AUTO DIFF WBC: CPT

## 2023-11-01 PROCEDURE — 2580000003 HC RX 258: Performed by: OBSTETRICS & GYNECOLOGY

## 2023-11-01 RX ORDER — SODIUM CHLORIDE 0.9 % (FLUSH) 0.9 %
5-40 SYRINGE (ML) INJECTION PRN
Status: DISCONTINUED | OUTPATIENT
Start: 2023-11-01 | End: 2023-11-03

## 2023-11-01 RX ORDER — NALBUPHINE HYDROCHLORIDE 20 MG/ML
10 INJECTION, SOLUTION INTRAMUSCULAR; INTRAVENOUS; SUBCUTANEOUS
Status: DISCONTINUED | OUTPATIENT
Start: 2023-11-01 | End: 2023-11-03

## 2023-11-01 RX ORDER — INSULIN LISPRO 100 [IU]/ML
0-4 INJECTION, SOLUTION INTRAVENOUS; SUBCUTANEOUS PRN
Status: DISCONTINUED | OUTPATIENT
Start: 2023-11-01 | End: 2023-11-03

## 2023-11-01 RX ORDER — SODIUM CHLORIDE 9 MG/ML
INJECTION, SOLUTION INTRAVENOUS PRN
Status: DISCONTINUED | OUTPATIENT
Start: 2023-11-01 | End: 2023-11-03

## 2023-11-01 RX ORDER — SODIUM CHLORIDE, SODIUM LACTATE, POTASSIUM CHLORIDE, CALCIUM CHLORIDE 600; 310; 30; 20 MG/100ML; MG/100ML; MG/100ML; MG/100ML
INJECTION, SOLUTION INTRAVENOUS CONTINUOUS
Status: DISCONTINUED | OUTPATIENT
Start: 2023-11-01 | End: 2023-11-03

## 2023-11-01 RX ORDER — SODIUM CHLORIDE 9 MG/ML
25 INJECTION, SOLUTION INTRAVENOUS PRN
Status: DISCONTINUED | OUTPATIENT
Start: 2023-11-01 | End: 2023-11-03

## 2023-11-01 RX ORDER — MISOPROSTOL 100 UG/1
800 TABLET ORAL PRN
Status: DISCONTINUED | OUTPATIENT
Start: 2023-11-01 | End: 2023-11-03

## 2023-11-01 RX ORDER — SODIUM CHLORIDE 0.9 % (FLUSH) 0.9 %
5-40 SYRINGE (ML) INJECTION EVERY 12 HOURS SCHEDULED
Status: DISCONTINUED | OUTPATIENT
Start: 2023-11-01 | End: 2023-11-03

## 2023-11-01 RX ORDER — SODIUM CHLORIDE, SODIUM LACTATE, POTASSIUM CHLORIDE, AND CALCIUM CHLORIDE .6; .31; .03; .02 G/100ML; G/100ML; G/100ML; G/100ML
1000 INJECTION, SOLUTION INTRAVENOUS PRN
Status: DISCONTINUED | OUTPATIENT
Start: 2023-11-01 | End: 2023-11-03

## 2023-11-01 RX ORDER — METHYLERGONOVINE MALEATE 0.2 MG/ML
200 INJECTION INTRAVENOUS PRN
Status: DISCONTINUED | OUTPATIENT
Start: 2023-11-01 | End: 2023-11-03

## 2023-11-01 RX ORDER — DEXTROSE MONOHYDRATE 50 MG/ML
100 INJECTION, SOLUTION INTRAVENOUS PRN
Status: DISCONTINUED | OUTPATIENT
Start: 2023-11-01 | End: 2023-11-03

## 2023-11-01 RX ORDER — SODIUM CHLORIDE, SODIUM LACTATE, POTASSIUM CHLORIDE, AND CALCIUM CHLORIDE .6; .31; .03; .02 G/100ML; G/100ML; G/100ML; G/100ML
500 INJECTION, SOLUTION INTRAVENOUS PRN
Status: DISCONTINUED | OUTPATIENT
Start: 2023-11-01 | End: 2023-11-03

## 2023-11-01 RX ORDER — FAMOTIDINE 10 MG/ML
20 INJECTION, SOLUTION INTRAVENOUS 2 TIMES DAILY
Status: DISCONTINUED | OUTPATIENT
Start: 2023-11-01 | End: 2023-11-03

## 2023-11-01 RX ORDER — DEXTROSE AND SODIUM CHLORIDE 5; .45 G/100ML; G/100ML
INJECTION, SOLUTION INTRAVENOUS CONTINUOUS
Status: DISCONTINUED | OUTPATIENT
Start: 2023-11-01 | End: 2023-11-03

## 2023-11-01 RX ORDER — GLUCAGON 1 MG/ML
1 KIT INJECTION PRN
Status: DISCONTINUED | OUTPATIENT
Start: 2023-11-01 | End: 2023-11-03

## 2023-11-01 RX ORDER — ONDANSETRON 2 MG/ML
4 INJECTION INTRAMUSCULAR; INTRAVENOUS EVERY 6 HOURS PRN
Status: DISCONTINUED | OUTPATIENT
Start: 2023-11-01 | End: 2023-11-03

## 2023-11-01 RX ORDER — CARBOPROST TROMETHAMINE 250 UG/ML
250 INJECTION, SOLUTION INTRAMUSCULAR PRN
Status: DISCONTINUED | OUTPATIENT
Start: 2023-11-01 | End: 2023-11-03

## 2023-11-01 RX ADMIN — Medication 10 ML: at 14:38

## 2023-11-01 RX ADMIN — Medication 25 MCG: at 10:41

## 2023-11-01 RX ADMIN — Medication 25 MCG: at 23:11

## 2023-11-01 RX ADMIN — SODIUM CHLORIDE, POTASSIUM CHLORIDE, SODIUM LACTATE AND CALCIUM CHLORIDE: 600; 310; 30; 20 INJECTION, SOLUTION INTRAVENOUS at 18:50

## 2023-11-01 RX ADMIN — Medication 25 MCG: at 14:44

## 2023-11-01 NOTE — PROGRESS NOTES
PIV flushed. 2nd dose of 25 mcg cytotec placed vaginally @ . Pt  tolerated well. BG taken, 99 @ 1447.

## 2023-11-01 NOTE — PROGRESS NOTES
BG taken - 113 @ 1244. Orders received from MD for telemetry monitors while pt ambulates. Tele placed.

## 2023-11-01 NOTE — PROGRESS NOTES
Pt presents to [facility] for scheduled induction for gestational diabetes requiring insulin and unstable lie. Pt ambulated to room 384 for admission. Pt is accompanied by FOB, Nia Guard. PT states she is feeling the baby move.  Pt denies vaginal bleeding or leaking of amniotic fluid,

## 2023-11-01 NOTE — H&P
Obstetrics History and Physical    CC:   Chief Complaint   Patient presents with    Scheduled Induction     Unstable lie, GDM (insulin requirement)       HPI: Marysue Leyden is a 35 y.o. [id number] at 38w0d who presents for scheduled IOL. Doing well today. +FM. Denies VB, LOF. Reports some contractions. Denies chest pain, fever, chills, nausea, vomiting. Did have some shortness of breath this morning due to asthma, is doing better now. Denies headache, vision changes, RUQ pain, increased LE edema. Pregnancy has been complicated by insulin dependent diabetes, maternal obesity, fetal macrosomia, anemia, resolved placenta previa, unstable fetal lie. Review of Systems:  The following ROS was otherwise negative, except as noted in the HPI: constitutional, HEENT, respiratory, cardiovascular, gastrointestinal, genitourinary, skin, musculoskeletal, neurological, psych    OBGYN Provider : Bhavin Tobin MD    Obstetrical History:  OB History    Para Term  AB Living   5 3 3 0 1 3   SAB IAB Ectopic Molar Multiple Live Births   1 0 0 0 0 3      # Outcome Date GA Lbr Jose/2nd Weight Sex Delivery Anes PTL Lv   5 Current            4 Term 21 39w4d 19:14 / 00:08 3.916 kg (8 lb 10.1 oz) F Vag-Spont None N ALEJANDRA      Birth Comments: patton; ID Band # [id number]      Name: Noemí Colladomer: 8  Apgar5: 9   3 SAB 20    U       2 Term 19 39w3d 05:00 / 00:17 3.637 kg (8 lb 0.3 oz) M Vag-Spont None N ALEJANDRA      Birth Comments: PATTON      Name: Spencer Galena: 7  Apgar5: 9   1 Term 17 40w3d  3.118 kg (6 lb 14 oz) M Vag-Spont EPI N ALEJANDRA      Name: Wiley Render: 9  Apgar5: 9     Past Medical History:   Past Medical History:   Diagnosis Date    ADD (attention deficit disorder)     Anemia     Asthma     last used inhaler yesterday    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

## 2023-11-01 NOTE — PROGRESS NOTES
25 mcg cytotec placed vaginally @ 1041. Pt tolerated well, instructed to remain in bed for 90 mins. BG taken at 1046, 88. Pt resting in bed on left side. Call light and bedside table within reach.

## 2023-11-01 NOTE — PLAN OF CARE
Problem: Vaginal Birth or  Section  Goal: Fetal and maternal status remain reassuring during the birth process  Description:  Birth OB-Pregnancy care plan goal which identifies if the fetal and maternal status remain reassuring during the birth process  Outcome: Progressing     Problem: Pain  Goal: Verbalizes/displays adequate comfort level or baseline comfort level  Outcome: Progressing     Problem: Safety - Adult  Goal: Free from fall injury  Outcome: Progressing     Pt resting in bed, S/O [place] at bedside for support. Reviewed plan. All questions answered by Manju Garcia MD.     Admission complete. Oriented to room and call light. Denies any other needs.

## 2023-11-01 NOTE — PROGRESS NOTES
Dr. Reese Engel at pt bedside. Bedside ultrasound done. Vertex presentation confirmed. SVE is 1cm, thick. Plan of care reviewed. Pt deciding between cervidil and Cytotec induction. MD to place orders.

## 2023-11-01 NOTE — PROGRESS NOTES
Pt put on efm at this time. Pt states she checked her blood glucose this morning. Pt fasting glucose was 91. Pt took her 12 units of humalin and then ate breakfast. Pt then checked her blood glucose one hour after and it was 133.

## 2023-11-01 NOTE — PROGRESS NOTES
Dr. Get Presley returned page and is aware of pt's wishes for cytotec induction. MD to place orders.

## 2023-11-02 LAB
EST. AVERAGE GLUCOSE BLD GHB EST-MCNC: 105.4 MG/DL
GLUCOSE BLD-MCNC: 101 MG/DL (ref 70–99)
GLUCOSE BLD-MCNC: 140 MG/DL (ref 70–99)
GLUCOSE BLD-MCNC: 80 MG/DL (ref 70–99)
GLUCOSE BLD-MCNC: 81 MG/DL (ref 70–99)
GLUCOSE BLD-MCNC: 84 MG/DL (ref 70–99)
GLUCOSE BLD-MCNC: 86 MG/DL (ref 70–99)
GLUCOSE BLD-MCNC: 87 MG/DL (ref 70–99)
GLUCOSE BLD-MCNC: 88 MG/DL (ref 70–99)
GLUCOSE BLD-MCNC: 90 MG/DL (ref 70–99)
GLUCOSE BLD-MCNC: 95 MG/DL (ref 70–99)
GLUCOSE BLD-MCNC: 96 MG/DL (ref 70–99)
GLUCOSE BLD-MCNC: 97 MG/DL (ref 70–99)
HBA1C MFR BLD: 5.3 %
PERFORMED ON: ABNORMAL
PERFORMED ON: ABNORMAL
PERFORMED ON: NORMAL

## 2023-11-02 PROCEDURE — 6360000002 HC RX W HCPCS: Performed by: OBSTETRICS & GYNECOLOGY

## 2023-11-02 PROCEDURE — 1220000000 HC SEMI PRIVATE OB R&B

## 2023-11-02 PROCEDURE — 2580000003 HC RX 258: Performed by: OBSTETRICS & GYNECOLOGY

## 2023-11-02 PROCEDURE — 6370000000 HC RX 637 (ALT 250 FOR IP): Performed by: OBSTETRICS & GYNECOLOGY

## 2023-11-02 RX ORDER — HYDROXYZINE PAMOATE 25 MG/1
25 CAPSULE ORAL ONCE
Status: COMPLETED | OUTPATIENT
Start: 2023-11-02 | End: 2023-11-02

## 2023-11-02 RX ADMIN — SODIUM CHLORIDE, POTASSIUM CHLORIDE, SODIUM LACTATE AND CALCIUM CHLORIDE: 600; 310; 30; 20 INJECTION, SOLUTION INTRAVENOUS at 19:07

## 2023-11-02 RX ADMIN — SODIUM CHLORIDE, POTASSIUM CHLORIDE, SODIUM LACTATE AND CALCIUM CHLORIDE: 600; 310; 30; 20 INJECTION, SOLUTION INTRAVENOUS at 11:11

## 2023-11-02 RX ADMIN — HYDROXYZINE PAMOATE 25 MG: 25 CAPSULE ORAL at 18:29

## 2023-11-02 RX ADMIN — Medication 1 MILLI-UNITS/MIN: at 08:23

## 2023-11-02 NOTE — FLOWSHEET NOTE
11/02/23 0721   Handoff   Communication Given Shift Handoff   Handoff Given To CAROLIN EGAN [address] [facility] Communication Face to Face; In department   Time Handoff Given 1910     Bedside introduction performed. Whiteboard updated. Shift assessment performed.

## 2023-11-02 NOTE — PROGRESS NOTES
BG taken @ 1239, 90. Pt sitting in bed. Pt planning to get up to ambulate and possibly utilize shower.

## 2023-11-02 NOTE — PROGRESS NOTES
Pt up to bathroom. BG taken @ 5515, 80.    VS taken. Pt denies any needs at this time. Repositioned after subtle late decels. Plan to increase pit rate with pts permission.

## 2023-11-02 NOTE — PROGRESS NOTES
Pitocin increased to 4 mu at 1144. Temp taken. Discussed pain interventions with patient and FOB. Birthing ball and peanut ball in room to be utilized. Also brought in towels, rags and new gown for when pt wants to go in the shower. Pt denies any other needs at this time. FOB at side for emotional support, aromatherapy and affirmation cards being utilized.

## 2023-11-02 NOTE — PROGRESS NOTES
Pt in bed with fob at bedside. Coping well, breathing through contractions, resting in between. Pitocin rate increased to 2mu @t 1110.

## 2023-11-02 NOTE — PROGRESS NOTES
RN to bedside. Discussed increasing pitocin rate. Pt declines at this time would like to finish nap before increasing. Will offer increase again at later time. Declines any other needs at this time. Call light and bedside table within reach. FOB remains at bedside for support.

## 2023-11-02 NOTE — PROGRESS NOTES
MD given update on pt ctx pattern and request to use birthing pool. Verbal order received for hydrotherapy. Pit increased to 6mu @ 1354. Tub set up, pt in @ 1410. [address] [facility] remain in place. Instructed FOB to let RN know if he leaves the floor d/t call light unable to reach birthing pool. Lights dimmed. Pt denies any other needs at this time.

## 2023-11-02 NOTE — PROGRESS NOTES
MD made aware of pts request for anxiety medication. Verbal ordered for vistaril x1. MD to bedside to discuss pit rate, pt requesting pit be turned off. Pit off at 2230 Maryellena . MD and pt agree with plan to have '1 hour break\" (off pitocin) then restart. Vistaril given at [address] [facility], [address].

## 2023-11-02 NOTE — PROGRESS NOTES
Brief Labor note:     35 y.o. [id number] @ 38w1d, here for FRANSISCO due to [facility]. Pt seen and examined. She is s/p cytotec x 4   Overnight SROM with clear fluid   Doing well this morning. Denies HA / vision changes, RUQ pain or worsening edema at this time. FHT:   140 bpm   mod variability  (+) accels, (-) decels   Contractions q 4-5 min     VE:    Now 3/75/-2   Forebag noted, AROM with clear fluid       IV Pitocin: 14 mU >> turning down to 0 mU    Plan:  Discussed birth plan, Recommend AROM to facilitate cervical change, pt verbally agreed and AROM of fore bag completed. Nursing staff at bedside for assistance. --------------------------------  Pt admits to very painful contractions since AROM of forebag   She requests IV pitocin be turned off so that she may \"rest\" -- I offered a compromise of turning pitocin down to 7 mU but she declines. We will plan a pitocin break for 1 hr then restart at 1 mU per protocol. She is also concerned about having her child be delivered tomorrow (11/3, on her other son's birthday). I reviewed my recommendations to continue active management of labor but that this will be a  possibility, especially with passive management if labor. Also reviewed the medical reasons for recommended induction with GDMA2 controlled on Insulin. She declines epidural for pain control, does not want to be asked further about this option.    Continue BS monitoring with tight control in labor   Reassuring fetal status     Please call with questions or concerns   Jalyn Hernandez, DO

## 2023-11-02 NOTE — FLOWSHEET NOTE
MD at bedside. 11/02/23 0732   Cervical Exam   Dilation (cm) 2   OB Nakia Omer MD     Spoke to pt about pit augmentation, verbalized understanding and agreeable to plan.

## 2023-11-02 NOTE — FLOWSHEET NOTE
Pit increased to 14mu @ 1750. MD given update on patient.       11/02/23 1757   Cervical Exam   Dilation (cm) 3   Effacement   (75)   [address] ([facility] [employer]) 7   OB Rosey Purcell MD   Membrane/Amniotic Fluid   Membrane Status Artificial   Sac Identifier Sac 2   Rupture Date 11/02/23   Rupture Time 1757

## 2023-11-02 NOTE — PROGRESS NOTES
Brief Labor note:     35 y.o. [id number] @ 38w1d, here for MIOL due to Counts include 234 beds at the [facility]. Pt seen and examined. She is s/p cytotec x 4   Overnight SROM with clear fluid   Doing well this morning. Denies HA / vision changes, RUQ pain or worsening edema at this time.      FHT:   140 bpm   mod variability  (+) accels, (-) decels   Contractions q 4-5 min     VE:    2/50/-3     IV Pitocin: 0mU     Plan:  Discussed birth plan   Recommend since her water has broken and she has not made change since earlier in the evening we start IV Pitocin, she agrees   She declines epidural for pain control, does not want to be asked further   Continue BS monitoring with tight control in labor   Reassuring fetal status     Please call with questions or concerns   Bari Reyes, DO

## 2023-11-02 NOTE — PLAN OF CARE
Problem: Vaginal Birth or  Section  Goal: Fetal and maternal status remain reassuring during the birth process  Description:  Birth OB-Pregnancy care plan goal which identifies if the fetal and maternal status remain reassuring during the birth process  Outcome: Progressing     Problem: Pain  Goal: Verbalizes/displays adequate comfort level or baseline comfort level  Outcome: Progressing     Problem: Safety - Adult  Goal: Free from fall injury  Outcome: Progressing     Problem: Chronic Conditions and Co-morbidities  Goal: Patient's chronic conditions and co-morbidity symptoms are monitored and maintained or improved  Outcome: Progressing    Pt resting on left side with peanut ball between legs. States pain is \"tolerable\". Plan for CWA. Denies any other needs at this time. Call light and bedside table within reach. Will continue to assess BG q2 hours.

## 2023-11-03 ENCOUNTER — ANESTHESIA EVENT (OUTPATIENT)
Dept: LABOR AND DELIVERY | Age: 33
DRG: 560 | End: 2023-11-03
Payer: COMMERCIAL

## 2023-11-03 ENCOUNTER — ANESTHESIA (OUTPATIENT)
Dept: LABOR AND DELIVERY | Age: 33
DRG: 560 | End: 2023-11-03
Payer: COMMERCIAL

## 2023-11-03 LAB
GLUCOSE BLD-MCNC: 185 MG/DL (ref 70–99)
GLUCOSE BLD-MCNC: 77 MG/DL (ref 70–99)
GLUCOSE BLD-MCNC: 80 MG/DL (ref 70–99)
GLUCOSE BLD-MCNC: 83 MG/DL (ref 70–99)
GLUCOSE BLD-MCNC: 85 MG/DL (ref 70–99)
GLUCOSE BLD-MCNC: 92 MG/DL (ref 70–99)
GLUCOSE BLD-MCNC: 96 MG/DL (ref 70–99)
GLUCOSE BLD-MCNC: 97 MG/DL (ref 70–99)
GLUCOSE BLD-MCNC: 98 MG/DL (ref 70–99)
PERFORMED ON: ABNORMAL
PERFORMED ON: NORMAL
REAGIN+T PALLIDUM IGG+IGM SERPL-IMP: NORMAL

## 2023-11-03 PROCEDURE — 2580000003 HC RX 258: Performed by: OBSTETRICS & GYNECOLOGY

## 2023-11-03 PROCEDURE — 3E033VJ INTRODUCTION OF OTHER HORMONE INTO PERIPHERAL VEIN, PERCUTANEOUS APPROACH: ICD-10-PCS | Performed by: OBSTETRICS & GYNECOLOGY

## 2023-11-03 PROCEDURE — 6360000002 HC RX W HCPCS: Performed by: NURSE ANESTHETIST, CERTIFIED REGISTERED

## 2023-11-03 PROCEDURE — 59409 OBSTETRICAL CARE: CPT | Performed by: OBSTETRICS & GYNECOLOGY

## 2023-11-03 PROCEDURE — 6360000002 HC RX W HCPCS: Performed by: OBSTETRICS & GYNECOLOGY

## 2023-11-03 PROCEDURE — 6370000000 HC RX 637 (ALT 250 FOR IP): Performed by: OBSTETRICS & GYNECOLOGY

## 2023-11-03 PROCEDURE — 2500000003 HC RX 250 WO HCPCS: Performed by: NURSE ANESTHETIST, CERTIFIED REGISTERED

## 2023-11-03 PROCEDURE — 10907ZC DRAINAGE OF AMNIOTIC FLUID, THERAPEUTIC FROM PRODUCTS OF CONCEPTION, VIA NATURAL OR ARTIFICIAL OPENING: ICD-10-PCS | Performed by: OBSTETRICS & GYNECOLOGY

## 2023-11-03 PROCEDURE — 1220000000 HC SEMI PRIVATE OB R&B

## 2023-11-03 PROCEDURE — 0KQM0ZZ REPAIR PERINEUM MUSCLE, OPEN APPROACH: ICD-10-PCS | Performed by: OBSTETRICS & GYNECOLOGY

## 2023-11-03 PROCEDURE — 3E0P7VZ INTRODUCTION OF HORMONE INTO FEMALE REPRODUCTIVE, VIA NATURAL OR ARTIFICIAL OPENING: ICD-10-PCS | Performed by: OBSTETRICS & GYNECOLOGY

## 2023-11-03 PROCEDURE — 51701 INSERT BLADDER CATHETER: CPT

## 2023-11-03 RX ORDER — SODIUM CHLORIDE 0.9 % (FLUSH) 0.9 %
5-40 SYRINGE (ML) INJECTION EVERY 12 HOURS SCHEDULED
Status: DISCONTINUED | OUTPATIENT
Start: 2023-11-03 | End: 2023-11-05 | Stop reason: HOSPADM

## 2023-11-03 RX ORDER — BUPIVACAINE HYDROCHLORIDE 2.5 MG/ML
INJECTION, SOLUTION EPIDURAL; INFILTRATION; INTRACAUDAL PRN
Status: DISCONTINUED | OUTPATIENT
Start: 2023-11-03 | End: 2023-11-03 | Stop reason: SDUPTHER

## 2023-11-03 RX ORDER — ONDANSETRON 2 MG/ML
4 INJECTION INTRAMUSCULAR; INTRAVENOUS EVERY 6 HOURS PRN
Status: DISCONTINUED | OUTPATIENT
Start: 2023-11-03 | End: 2023-11-05 | Stop reason: HOSPADM

## 2023-11-03 RX ORDER — SIMETHICONE 80 MG
80 TABLET,CHEWABLE ORAL EVERY 6 HOURS PRN
Status: DISCONTINUED | OUTPATIENT
Start: 2023-11-03 | End: 2023-11-05 | Stop reason: HOSPADM

## 2023-11-03 RX ORDER — SODIUM CHLORIDE, SODIUM LACTATE, POTASSIUM CHLORIDE, CALCIUM CHLORIDE 600; 310; 30; 20 MG/100ML; MG/100ML; MG/100ML; MG/100ML
INJECTION, SOLUTION INTRAVENOUS CONTINUOUS
Status: DISCONTINUED | OUTPATIENT
Start: 2023-11-03 | End: 2023-11-05 | Stop reason: HOSPADM

## 2023-11-03 RX ORDER — ACETAMINOPHEN 500 MG
1000 TABLET ORAL EVERY 8 HOURS SCHEDULED
Status: DISCONTINUED | OUTPATIENT
Start: 2023-11-03 | End: 2023-11-05 | Stop reason: HOSPADM

## 2023-11-03 RX ORDER — SODIUM CHLORIDE 0.9 % (FLUSH) 0.9 %
5-40 SYRINGE (ML) INJECTION PRN
Status: DISCONTINUED | OUTPATIENT
Start: 2023-11-03 | End: 2023-11-05 | Stop reason: HOSPADM

## 2023-11-03 RX ORDER — FERROUS SULFATE 325(65) MG
325 TABLET ORAL
Status: DISCONTINUED | OUTPATIENT
Start: 2023-11-04 | End: 2023-11-05 | Stop reason: HOSPADM

## 2023-11-03 RX ORDER — SODIUM CHLORIDE 9 MG/ML
INJECTION, SOLUTION INTRAVENOUS PRN
Status: DISCONTINUED | OUTPATIENT
Start: 2023-11-03 | End: 2023-11-05 | Stop reason: HOSPADM

## 2023-11-03 RX ORDER — DOCUSATE SODIUM 100 MG/1
100 CAPSULE, LIQUID FILLED ORAL 2 TIMES DAILY
Status: DISCONTINUED | OUTPATIENT
Start: 2023-11-03 | End: 2023-11-05 | Stop reason: HOSPADM

## 2023-11-03 RX ORDER — IBUPROFEN 800 MG/1
800 TABLET ORAL EVERY 8 HOURS SCHEDULED
Status: DISCONTINUED | OUTPATIENT
Start: 2023-11-03 | End: 2023-11-05 | Stop reason: HOSPADM

## 2023-11-03 RX ORDER — FAMOTIDINE 20 MG/1
20 TABLET, FILM COATED ORAL 2 TIMES DAILY PRN
Status: DISCONTINUED | OUTPATIENT
Start: 2023-11-03 | End: 2023-11-05 | Stop reason: HOSPADM

## 2023-11-03 RX ORDER — LANOLIN 100 %
OINTMENT (GRAM) TOPICAL PRN
Status: DISCONTINUED | OUTPATIENT
Start: 2023-11-03 | End: 2023-11-05 | Stop reason: HOSPADM

## 2023-11-03 RX ADMIN — INSULIN LISPRO 4 UNITS: 100 INJECTION, SOLUTION INTRAVENOUS; SUBCUTANEOUS at 07:00

## 2023-11-03 RX ADMIN — Medication 1 MILLI-UNITS/MIN: at 10:54

## 2023-11-03 RX ADMIN — SODIUM CHLORIDE, POTASSIUM CHLORIDE, SODIUM LACTATE AND CALCIUM CHLORIDE: 600; 310; 30; 20 INJECTION, SOLUTION INTRAVENOUS at 03:20

## 2023-11-03 RX ADMIN — Medication 1 MILLI-UNITS/MIN: at 16:28

## 2023-11-03 RX ADMIN — Medication 15 ML/HR: at 15:55

## 2023-11-03 RX ADMIN — SODIUM CHLORIDE, POTASSIUM CHLORIDE, SODIUM LACTATE AND CALCIUM CHLORIDE: 600; 310; 30; 20 INJECTION, SOLUTION INTRAVENOUS at 16:27

## 2023-11-03 RX ADMIN — ACETAMINOPHEN 1000 MG: 500 TABLET ORAL at 22:44

## 2023-11-03 RX ADMIN — SODIUM CHLORIDE, POTASSIUM CHLORIDE, SODIUM LACTATE AND CALCIUM CHLORIDE: 600; 310; 30; 20 INJECTION, SOLUTION INTRAVENOUS at 21:41

## 2023-11-03 RX ADMIN — BUPIVACAINE HYDROCHLORIDE 1 ML: 2.5 INJECTION, SOLUTION EPIDURAL; INFILTRATION; INTRACAUDAL; PERINEURAL at 15:45

## 2023-11-03 ASSESSMENT — PAIN - FUNCTIONAL ASSESSMENT: PAIN_FUNCTIONAL_ASSESSMENT: ACTIVITIES ARE NOT PREVENTED

## 2023-11-03 ASSESSMENT — PAIN DESCRIPTION - LOCATION: LOCATION: PERINEUM;RECTUM

## 2023-11-03 ASSESSMENT — PAIN SCALES - GENERAL: PAINLEVEL_OUTOF10: 5

## 2023-11-03 NOTE — PROGRESS NOTES
Pt. declined restarting pitocin. Pt. states she is \"done and does not want to be checked. \" Pt declined all intervention. Pt. requesting . This RN will notify to Dr. Patrick Walter.

## 2023-11-03 NOTE — PROGRESS NOTES
Call placed to Dr Beryl Schwab for Pt to eat and shower before restarting pitocin. Dr Beryl Schwab en route to hospital to discuss risks and benefits of options with Pt and family at bedside and Pt made aware.

## 2023-11-03 NOTE — ANESTHESIA PROCEDURE NOTES
CSE Block    Patient location during procedure: OB  Start time: 11/3/2023 3:42 PM  End time: 11/3/2023 3:55 PM  Reason for block: labor epidural  Staffing  Performed: resident/CRNA   Resident/CRNA: HITESH Tucker CRNA  Performed by: HITESH Tucker CRNA  Authorized by: HITESH Tucker CRNA    CSE  Patient position: sitting  Prep: ChloraPrep and site prepped and draped  Patient monitoring: continuous pulse ox and frequent blood pressure checks  Approach: midline  Provider prep: mask and sterile gloves  Spinal Needle  Needle type: pencil-tip   Needle gauge: 25 G  Needle length: 4.75 in  Epidural Needle  Injection technique: BAY saline  Needle type: Tuohy   Needle gauge: 17 G  Needle length: 3.5 in  Needle insertion depth: 8 cm  Location: lumbar (1-5)  Catheter  Catheter type: side hole  Catheter size: 19 G  Catheter at skin depth: 13 cm  Test dose: negative  Assessment  Hemodynamics: stable  Additional Notes  Patient in sitting position. Sterile prep and drape applied to back. Skin localization with 5ml of lidocaine 1%. LORT with NS, CSE with 25g pencan- return of clear free flowing csf. Epidural catheter advanced easily. Test dose given incrementally after negative aspirate from catheter. Sterile dressing applied. Patient tolerated procedure well.    Preanesthetic Checklist  Completed: patient identified, IV checked, site marked, risks and benefits discussed, surgical/procedural consents, equipment checked, pre-op evaluation, timeout performed, anesthesia consent given, oxygen available, monitors applied/VS acknowledged and blood product R/B/A discussed and consented

## 2023-11-03 NOTE — PROGRESS NOTES
Patient actively pushing. RN remains in continuous attendance at the bedside. Assessment & evaluation of fetal heart rate ongoing via continuous EFM.   Dr Ilsa Su at bedside

## 2023-11-03 NOTE — PROGRESS NOTES
Notified Dr. Wen Espana that pt. declined cervical exam and nipple stimulation for labor augmentation. No new orders at this time.
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
## 2023-11-03 NOTE — PROGRESS NOTES
Pt declines starting pitocin at this time. States her contractions are picking up and wants to give it more time. This RN will check back in with patient in 30 minutes.

## 2023-11-03 NOTE — ANESTHESIA PRE PROCEDURE
Department of Anesthesiology  Preprocedure Note       Name:  Yisel Watson   Age:  35 y.o.  :  1990                                          MRN:  [id number]         Date:  11/3/2023      Surgeon: * Surgery not found *    Procedure:     Medications prior to admission:   Prior to Admission medications    Medication Sig Start Date End Date Taking?  Authorizing Provider   Alcohol Swabs ([employer] PREP) 70 % PADS APPLY ROUTE 4 TIMES DAILY 10/24/23   Christiana MARTI, DO   insulin NPH (HUMULIN N;NOVOLIN N) 100 UNIT/ML injection pen Inject 12 Units into the skin every morning 10/23/23   Love Sevilla,    Insulin Pen Needle 32G X 4 MM MISC 1 each by Does not apply route daily 10/15/23   Love Sevilla,    famotidine (PEPCID) 20 MG tablet Take 1 tablet by mouth 2 times daily 10/15/23   Love Sevilla DO   Respiratory Therapy Supplies (NEBULIZER/TUBING/MOUTHPIECE) KIT 1 kit by Does not apply route daily as needed (asthma flare up) 10/10/23   Heather Berry MD   albuterol (PROVENTIL) (2.5 MG/3ML) 0.083% nebulizer solution Take 3 mLs by nebulization every 4 hours as needed for Wheezing or Shortness of Breath 10/10/23   Heather Berry MD   ondansetron (ZOFRAN-ODT) 4 MG disintegrating tablet Take 1 tablet by mouth 3 times daily as needed for Nausea or Vomiting 23   Christiana Tejeda,    doxyLAMINE succinate (GNP SLEEP AID) 25 MG tablet Take 1 tablet by mouth nightly 23  Marley HADDAD DO   melatonin (RA MELATONIN) 3 MG TABS tablet Take 1 tablet by mouth daily 9/26/23 10/26/23  Marley HADDAD, DO   ferrous sulfate (IRON 325) 325 (65 Fe) MG tablet Take 1 tablet by mouth 2 times daily  Patient not taking: Reported on 2023   Christiana Tejeda P, DO   ferrous sulfate (IRON 325) 325 (65 Fe) MG tablet Take 1 tablet by mouth daily (with breakfast)  Patient not taking: Reported on 2023 9/10/23   Massiel Urban, APRN - CNP

## 2023-11-03 NOTE — PROGRESS NOTES
Patient fully dilated. Patient pushing with contractions at this time. Decent maternal effect but last two babies were born without epidurals. Will allow patient to continue to push pending fetal and maternal status.

## 2023-11-03 NOTE — PROGRESS NOTES
Pt. declines pitocin at this time. Pt. states she will reevaluate starting pitocin when Dr. Rickey Berger comes on this morning.

## 2023-11-03 NOTE — PROGRESS NOTES
Brief Labor note:     35 y.o. [id number] @ 38w2d, here for FRANSISCO due to [facility]. Pt seen and examined. Discussed progress with nursing multiple times overnight, pt continued to decline both vaginal exams and IV pitocin. Now she states she feels \"great\" and believes \"the vistaril was what made me act so crazy last night\". She stated, again, that she makes statements in labor that are unrealistic or outlandish. We confirmed she is not suicidal and does not want to harm herself or anyone else. I told her I was relieved she is in better spirits this morning. I reviewed that she is no longer luzma and patient agrees that \"she can't feel them anymore\". Denies HA / vision changes, RUQ pain or worsening edema at this time. FHT:   130 bpm   Mod variability  (+) accels, (-) decels   Contractions  infrequent     VE:    Declined   Will let us check her again \"once she wakes up more\"     IV Pitocin: 0 mU     Plan:  Pt declines VE this morning again   But is willing to have a re-check and re-start IV pitocin in an hour (0600)   I agree with IV Pitocin per protocol especially since she has been ruptured (questionably since the night before but) definitely since 1730 11/2/23. I reviewed risks of infection if we do not actively manager her labor course, she verbalizes understanding this morning.       Please call with questions or concerns   Rhys Alas, DO

## 2023-11-04 LAB
BASOPHILS # BLD: 0 K/UL (ref 0–0.2)
BASOPHILS NFR BLD: 0.4 %
DEPRECATED RDW RBC AUTO: 17.2 % (ref 12.4–15.4)
EOSINOPHIL # BLD: 0.1 K/UL (ref 0–0.6)
EOSINOPHIL NFR BLD: 1 %
GLUCOSE BLD-MCNC: 85 MG/DL (ref 70–99)
HCT VFR BLD AUTO: 23.6 % (ref 36–48)
HGB BLD-MCNC: 7.5 G/DL (ref 12–16)
LYMPHOCYTES # BLD: 1.9 K/UL (ref 1–5.1)
LYMPHOCYTES NFR BLD: 16.1 %
MCH RBC QN AUTO: 21.7 PG (ref 26–34)
MCHC RBC AUTO-ENTMCNC: 31.6 G/DL (ref 31–36)
MCV RBC AUTO: 68.7 FL (ref 80–100)
MONOCYTES # BLD: 0.8 K/UL (ref 0–1.3)
MONOCYTES NFR BLD: 6.8 %
NEUTROPHILS # BLD: 8.7 K/UL (ref 1.7–7.7)
NEUTROPHILS NFR BLD: 75.7 %
PATH INTERP BLD-IMP: NO
PERFORMED ON: NORMAL
PLATELET # BLD AUTO: 186 K/UL (ref 135–450)
PMV BLD AUTO: 9.7 FL (ref 5–10.5)
RBC # BLD AUTO: 3.44 M/UL (ref 4–5.2)
WBC # BLD AUTO: 11.5 K/UL (ref 4–11)

## 2023-11-04 PROCEDURE — 7200000001 HC VAGINAL DELIVERY

## 2023-11-04 PROCEDURE — 85025 COMPLETE CBC W/AUTO DIFF WBC: CPT

## 2023-11-04 PROCEDURE — 6370000000 HC RX 637 (ALT 250 FOR IP): Performed by: OBSTETRICS & GYNECOLOGY

## 2023-11-04 PROCEDURE — 1220000000 HC SEMI PRIVATE OB R&B

## 2023-11-04 PROCEDURE — 2580000003 HC RX 258: Performed by: OBSTETRICS & GYNECOLOGY

## 2023-11-04 RX ORDER — DIAPER,BRIEF,INFANT-TODD,DISP
EACH MISCELLANEOUS PRN
Status: DISCONTINUED | OUTPATIENT
Start: 2023-11-04 | End: 2023-11-05 | Stop reason: HOSPADM

## 2023-11-04 RX ADMIN — MINERAL OIL, PETROLATUM, PHENYLEPHRINE HCL: 2.5; 140; 749 OINTMENT TOPICAL at 15:57

## 2023-11-04 RX ADMIN — ACETAMINOPHEN 1000 MG: 500 TABLET ORAL at 15:53

## 2023-11-04 RX ADMIN — FERROUS SULFATE TAB 325 MG (65 MG ELEMENTAL FE) 325 MG: 325 (65 FE) TAB at 15:54

## 2023-11-04 RX ADMIN — Medication 10 ML: at 21:06

## 2023-11-04 RX ADMIN — WITCH HAZEL: 500 SOLUTION RECTAL; TOPICAL at 21:13

## 2023-11-04 RX ADMIN — DOCUSATE SODIUM 100 MG: 100 CAPSULE, LIQUID FILLED ORAL at 21:05

## 2023-11-04 RX ADMIN — ACETAMINOPHEN 1000 MG: 500 TABLET ORAL at 06:39

## 2023-11-04 ASSESSMENT — PAIN DESCRIPTION - LOCATION: LOCATION: BACK;BUTTOCKS

## 2023-11-04 NOTE — PROGRESS NOTES
Pt assisted by 2 staff members to restroom for first time get up. Pt denies dizziness or lightheadedness. Gait steady. Pt voided 400cc urine without difficulty. Pericare done by pt with RN instruction. New ice pack, peripad, and underwear applied. Gown changed. Pt then washed hands and transferred to couch. Pt tolerated well.

## 2023-11-04 NOTE — PLAN OF CARE
Problem: Vaginal Birth or  Section  Goal: Fetal and maternal status remain reassuring during the birth process  Description:  Birth OB-Pregnancy care plan goal which identifies if the fetal and maternal status remain reassuring during the birth process  Outcome: Progressing     Problem: Postpartum  Goal: Experiences normal postpartum course  Description:  Postpartum OB-Pregnancy care plan goal which identifies if the mother is experiencing a normal postpartum course  Outcome: Progressing     Problem: Pain  Goal: Verbalizes/displays adequate comfort level or baseline comfort level  Outcome: Progressing     Problem: Safety - Adult  Goal: Free from fall injury  Outcome: Progressing     Problem: Discharge Planning  Goal: Discharge to home or other facility with appropriate resources  Outcome: Progressing     Problem: Chronic Conditions and Co-morbidities  Goal: Patient's chronic conditions and co-morbidity symptoms are monitored and maintained or improved  Outcome: Progressing

## 2023-11-04 NOTE — PROGRESS NOTES
This RN reviewed all documentation and supervised all care done by MICAH Ibanez RN.   Tawnya Benavides RN

## 2023-11-04 NOTE — PROGRESS NOTES
[facility] Ob/Gyn  Post Partum Progress Note    Subjective:   Patient is a 35 y.o. y/o  female S/P  at 38w2d EGA. PPD # 1. Pregnancy has been complicated by insulin dependent diabetes, maternal obesity, fetal macrosomia, anemia, resolved placenta previa, unstable fetal lie. Doing well today. No current complaints are noted including headache, change in vision, fever, chills, chest pain, shortness of breath, nausea, vomiting, diarrhea or constipation. The patient denies dizziness with ambulation or calf tenderness. Voiding spontaneously. Mueller Floss is described as minimal. The patient plans to breastfeed. Objective:   GENERAL APPEARANCE: alert, well appearing, in no apparent distress  LUNGS: Resp effort normal   HEART: Reg rate   ABDOMEN POSTPARTUM: benign non-tender, without masses or organomegaly palpable . Uterine Fundus firm, NT, Below umbilicus.    EXTREMITIES: no redness or tenderness in the calves or thighs, no edema  SKIN: normal coloration and turgor, no rashes, no suspicious skin lesions noted    Pertinent Labs:   H/H: 9.4/29.2 > 7.5/23.6    Assessment / Plan:  1) Post partum    - meeting post partum milestones    - hemodynamically stable     2) A+ / RI / GBS neg     3) Baby Information (Delivered 11/3/2023 2020)   MRN: [id number] Bed: 384-01N    Delivering clinician: Raj Chaparro DO GA: 38w2d Delivery method: Vaginal, Spontaneous   Birth weight: 3.142 kg (6 lb 14.8 oz) Apgars 1, 5, 10 min: 9, 9, -- Forceps?: No   Feeding plan: Breast Milk          4) GDMA2 -- fasting within normal limits, plan for 2hr GTT at 6 wks, resume normal diet      5) Anemia -- present on admission, exacerbated by delivery, asymptomatic, PO iron and colace for home, cont to observe for symptoms     Disposition:   Post Partum Instructions reviewed   Anticipate discharge on PPD # 2 pending clinical status     Kassidy Gibson DO

## 2023-11-04 NOTE — LACTATION NOTE
after hand expression of colostrum for infant, SRS with gentle stimulation provided. Mother confirms that the latch is comfortable and feels confident. Name and number provided on whiteboard. Encouraged to call for [facility] to assess latch and for f/u support prn. Response: Verbalized understanding of teaching provided. Will call for f/u support prn.

## 2023-11-04 NOTE — PROGRESS NOTES
Spoke with Dr Krys Banegas via telephone to make aware pt was ruptured for 38 hrs. No new orders, advised to monitor baby.

## 2023-11-04 NOTE — ANESTHESIA POSTPROCEDURE EVALUATION
Department of Anesthesiology  Postprocedure Note    Patient: Kate Wolf  MRN: [id number]  YOB: 1990  Date of evaluation: 11/4/2023      Procedure Summary     Date: 11/03/23 Room / Location:     Anesthesia Start: 1523 Anesthesia Stop: 2020    Procedure: Labor Analgesia Diagnosis:     Scheduled Providers:  Responsible Provider: Sarahi Puri MD    Anesthesia Type: CSE ASA Status: 3          Anesthesia Type: No value filed. Nusrat Phase I: Nusrat Score: 9    Nusrat Phase II: Nusrat Score: 10      Anesthesia Post Evaluation    Level of consciousness: awake  Complications: no  Cardiovascular status: hemodynamically stable  Respiratory status: acceptable  Comments: No apparent complications from neuraxial anesthesia.   Pain management: adequate

## 2023-11-04 NOTE — L&D DELIVERY NOTE
[facility] Obstetrics and Gynecology  Spontaneous Vaginal Delivery Note        Pre-operative Diagnosis:    Insulin dependent diabetes  Maternal obesity   Fetal macrosomia   Anemia   Unstable fetal lie       Post-operative Diagnosis:   Insulin dependent diabetes  Maternal obesity   Fetal macrosomia   Anemia   Unstable fetal lie               Anesthesia:  epidural       Admission and Delivery:       Patient presented to [address] [place] [address] and Delivery for IOL. She received cytotec, IV Pitocin and Epidural for comfort. Patient progressed spontaneously to complete cervical dilation. Began pushing and with good maternal effort. The infant was delivered in the DOP position. The shoulders and body were quickly to follow with maternal efforts. Infant was delivered with good tone and spontaneous cry without complication. Mouth and nose were bulb suctioned at maternal abdomen. Delayed cord clamping, partner cut cord after 1 min as cord no longer pulsating. Cord segment and cord blood were obtained. Cord gasses were collected, however due to fetal wellbeing were discarded. APGARS were noted to be 9 and 9. Delivery Date and Time: 11-3-2023 8:20 pm .      The placenta was delivered spontaneously with manual massage of the uterus and was noted to be intact with a 3 vessel cord. Pitocin was started immediately after placenta delivery. Bleeding noted to be appropriate. Small left vaginal wall laceration was noted and repaired with 2-0 Vicryl. The patient tolerated well and is in stable condition following delivery. EBL: 200 ml     Infant Wt: Pending     APGARS: 9, 9      Specimen:  Placenta / Cord segment      Condition:  infant stable and mother stable      Attending Attestation: I performed the procedure.     Reji Bridges DO

## 2023-11-04 NOTE — PROGRESS NOTES
RN notified Dr. Nancy Frank at this time about this RN being unable to obtain a fasting blood sugar on patient as patient was currently eating cranberry juice and various snacks. Patient was reminded that a fasting blood sugar was going to be obtained this morning, however ate anyways. Dr. Nancy Frank states okay and to not obtain the blood sugar.

## 2023-11-05 VITALS
TEMPERATURE: 98 F | RESPIRATION RATE: 18 BRPM | BODY MASS INDEX: 40.97 KG/M2 | OXYGEN SATURATION: 99 % | SYSTOLIC BLOOD PRESSURE: 116 MMHG | WEIGHT: 240 LBS | HEART RATE: 85 BPM | HEIGHT: 64 IN | DIASTOLIC BLOOD PRESSURE: 73 MMHG

## 2023-11-05 PROCEDURE — 6370000000 HC RX 637 (ALT 250 FOR IP): Performed by: OBSTETRICS & GYNECOLOGY

## 2023-11-05 RX ORDER — FERROUS SULFATE 325(65) MG
325 TABLET ORAL
Qty: 30 TABLET | Refills: 5 | Status: SHIPPED | OUTPATIENT
Start: 2023-11-05

## 2023-11-05 RX ORDER — KETOROLAC TROMETHAMINE 30 MG/ML
INJECTION, SOLUTION INTRAMUSCULAR; INTRAVENOUS
Status: DISCONTINUED
Start: 2023-11-05 | End: 2023-11-05 | Stop reason: HOSPADM

## 2023-11-05 RX ORDER — LIDOCAINE HYDROCHLORIDE 10 MG/ML
INJECTION, SOLUTION INFILTRATION; PERINEURAL
Status: DISCONTINUED
Start: 2023-11-05 | End: 2023-11-05 | Stop reason: HOSPADM

## 2023-11-05 RX ORDER — DIAPER,BRIEF,INFANT-TODD,DISP
EACH MISCELLANEOUS
Qty: 30 G | Refills: 1 | Status: SHIPPED | OUTPATIENT
Start: 2023-11-05 | End: 2023-11-12

## 2023-11-05 RX ORDER — PSEUDOEPHEDRINE HCL 30 MG
100 TABLET ORAL 2 TIMES DAILY
Qty: 180 CAPSULE | Refills: 0 | Status: SHIPPED | OUTPATIENT
Start: 2023-11-05

## 2023-11-05 RX ADMIN — DOCUSATE SODIUM 100 MG: 100 CAPSULE, LIQUID FILLED ORAL at 07:36

## 2023-11-05 RX ADMIN — ACETAMINOPHEN 1000 MG: 500 TABLET ORAL at 07:35

## 2023-11-05 RX ADMIN — ACETAMINOPHEN 1000 MG: 500 TABLET ORAL at 00:02

## 2023-11-05 RX ADMIN — FERROUS SULFATE TAB 325 MG (65 MG ELEMENTAL FE) 325 MG: 325 (65 FE) TAB at 07:36

## 2023-11-05 ASSESSMENT — PAIN - FUNCTIONAL ASSESSMENT
PAIN_FUNCTIONAL_ASSESSMENT: ACTIVITIES ARE NOT PREVENTED
PAIN_FUNCTIONAL_ASSESSMENT: ACTIVITIES ARE NOT PREVENTED

## 2023-11-05 ASSESSMENT — PAIN SCALES - GENERAL
PAINLEVEL_OUTOF10: 4
PAINLEVEL_OUTOF10: 4

## 2023-11-05 ASSESSMENT — PAIN DESCRIPTION - DESCRIPTORS: DESCRIPTORS: CRAMPING

## 2023-11-05 ASSESSMENT — PAIN DESCRIPTION - LOCATION: LOCATION: ABDOMEN

## 2023-11-05 NOTE — PROGRESS NOTES
[facility] Ob/Gyn  Post Partum Progress Note    Subjective:   Patient is a 35 y.o. y/o  female S/P  at 38w2d EGA. PPD # 2. Pregnancy has been complicated by insulin dependent diabetes, maternal obesity, fetal macrosomia, anemia, resolved placenta previa, unstable fetal lie. Doing well today. No current complaints are noted including headache, change in vision, fever, chills, chest pain, shortness of breath, nausea, vomiting, diarrhea or constipation. The patient denies dizziness with ambulation or calf tenderness. Voiding spontaneously. Eleonora [place] is described as minimal. The patient plans to breastfeed. Objective:   GENERAL APPEARANCE: alert, well appearing, in no apparent distress  LUNGS: Resp effort normal   HEART: Reg rate   ABDOMEN POSTPARTUM: benign non-tender, without masses or organomegaly palpable . Uterine Fundus firm, NT, Below umbilicus.    EXTREMITIES: no redness or tenderness in the calves or thighs, no edema  SKIN: normal coloration and turgor, no rashes, no suspicious skin lesions noted    Pertinent Labs:   H/H: 9.4/29.2 > 7.5/23.6    Assessment / Plan:  1) Post partum    - meeting post partum milestones    - hemodynamically stable     2) A+ / RI / GBS neg     3) Baby Information (Delivered 11/3/2023 2020)   MRN: [id number] Bed: 384-N    Delivering clinician: Ruby Villeda DO GA: 38w2d Delivery method: Vaginal, Spontaneous   Birth weight: 3.142 kg (6 lb 14.8 oz) Apgars 1, 5, 10 min: 9, 9, -- Forceps?: No   Feeding plan: Breast Milk          4) GDMA2 -- fasting within normal limits, plan for 2hr GTT at 6 wks, resume normal diet      5) Anemia -- present on admission, exacerbated by delivery, asymptomatic, PO iron and colace for home, cont to observe for symptoms     Disposition:   Post Partum Instructions reviewed   DC to home   RX to pharmacy   FU in 2 weeks in office     Jordan Frank DO

## 2023-11-05 NOTE — DISCHARGE SUMMARY
Department of Obstetrics and Gynecology  Postpartum Discharge Summary      Admit Date: 2023    Admit Diagnosis: Insulin controlled gestational diabetes mellitus (GDM) in third trimester [O24.414]    Discharge Date: 23    Condition at Discharge: good    Discharge Diagnoses: spontaneous vaginal delivery    Discharge Disposition:  Home    Service: Obstetrics    Postpartum complications:  prolonged induction       Hospital Course: uncomplicated    [place] Data:  Information for the patient's :  Troy Wooten [[id number]]      Weight   Information for the patient's :  Troy Wooten [[id number]]      Apgars   Information for the patient's :  Troy Wooten [[id number]]       Disposition of Baby:  Home with mother    FU in 2 weeks in office    Current Discharge Medication List        START taking these medications    Details   docusate sodium (COLACE, DULCOLAX) 100 MG CAPS Take 100 mg by mouth 2 times daily  Qty: 180 capsule, Refills: 0      hydrocortisone 1 % cream Apply topically 2 times daily.   Qty: 30 g, Refills: 1           CONTINUE these medications which have CHANGED    Details   ferrous sulfate (IRON 325) 325 (65 Fe) MG tablet Take 1 tablet by mouth daily (with breakfast)  Qty: 30 tablet, Refills: 5    Associated Diagnoses: Iron deficiency anemia, unspecified iron deficiency anemia type           CONTINUE these medications which have NOT CHANGED    Details   Alcohol Swabs (CVS PREP) 70 % PADS APPLY ROUTE 4 TIMES DAILY  Qty: 120 each, Refills: 1      insulin NPH (HUMULIN N;NOVOLIN N) 100 UNIT/ML injection pen Inject 12 Units into the skin every morning  Qty: 5 Adjustable Dose Pre-filled Pen Syringe, Refills: 3    Associated Diagnoses: Insulin controlled gestational diabetes mellitus (GDM) in third trimester      Insulin Pen Needle 32G X 4 MM MISC 1 each by Does not apply route daily  Qty: 100 each, Refills: 3      famotidine (PEPCID) 20 MG tablet Take 1 tablet by

## 2023-11-05 NOTE — DISCHARGE INSTRUCTIONS
Thank you for the opportunity to care for you and your family. We hope that you are happy with the care we provided during your stay in the [facility]. We want to ensure that you have the help you need when you leave the hospital. If there is anything we can assist you with, please let us know. Breastfeeding mothers may contact our lactation specialists with any problems or questions. [facility] lactation services phone number is [phone]. Leave a message and your call will be returned. Please refer to the information provided in the postpartum care booklet. The following are warning signs to remember. Call 911 if you have:    Chest pain or pressure  Shortness of breath, even at rest  Thoughts of harming yourself or others  Seizures    Call your healthcare provider if you have:    Temperature of 100.4 degrees or higher  Stitches that are not healing        -- Swelling, bleeding, drainage, foul odor, redness or warmth in/around your           stitches, staples, or incision (scar)        -- Bad smelling blood or discharge from the vagina  Vaginal bleeding that has increased         -- Soaking through one pad in an hour        -- You are passing clots larger than the size of a lemon  Red, warm tender area(s) in your breast or calf  Headache that does not get better, even after taking medicine; or headache with vision changes    Remember to notify all healthcare providers from your date of delivery to up to one year after giving birth! CARING FOR YOURSELF        DIET/ACTIVITY    Eat a well balanced diet focusing on foods high in fiber and protein. Drink plenty of fluids, especially water. To avoid constipation you may take a mild stool softener as recommended by your doctor or midwife. Gradually increase your activity. Resume an exercise regime only after being advised by your doctor or midwife. When sitting or lying down, keep your legs elevated to reduce swelling.   Avoid
No risk alerts present

## 2023-11-05 NOTE — FLOWSHEET NOTE
Postpartum and infant care teaching completed. Patient verbalized understanding of all teaching points and denies further questions. Patient plans to follow-up with [facility] Provider as instructed. ID bands checked. Father's ID band and one of baby's ID bands removed and taped to discharge instruction sheet. Patient discharged home in stable condition accompanied by fob. Discharged via wheelchair, holding baby in a rear facing car seat.

## 2023-11-05 NOTE — DISCHARGE INSTR - ACTIVITY
Call for increased pain, significant vaginal bleeding (soaking through 2 pads in < 1hr) or temperature greater than 101 degrees F. Nothing in vagina for 6 weeks (pelvic rest), including intercourse, tampons, toys, fingers. Advance activity as tolerated but limit lifting <10 lbs or weight of baby in carrier for first 2 weeks. Continue PNV. Take PRN medications as prescribed. FU in office in 2 weeks. Normal Diet     Please call with questions or concerns.    Berna Vazquez,
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
## 2023-11-06 ENCOUNTER — PATIENT MESSAGE (OUTPATIENT)
Dept: OBGYN CLINIC | Age: 33
End: 2023-11-06

## 2023-11-07 NOTE — TELEPHONE ENCOUNTER
From: [place]  To: Dr. Unique Cedeno: 11/6/2023 9:34 PM EST  Subject: Clots    Hi,    Vaginal delivery was 11/3. I just passed this large clot 11/6. Is this normal? Please advise.     Wally Zamarripa   [phone]

## 2023-11-07 NOTE — TELEPHONE ENCOUNTER
Called pt to check on symptoms. Pt passed a large clot yesterday 11/6/23. Pt has not passed anymore large clots since yesterday, only small clots and bleeding but not heavy. Pt denies lightheadedness/dizziness or feeling faint. Informed pt to continue to monitor bleeding. Pt was given ED bleeding and pain precautions.  aware and agree's with monitoring and if pt desires evaluation in ED that is okay too.

## 2023-11-17 ENCOUNTER — POSTPARTUM VISIT (OUTPATIENT)
Dept: OBGYN CLINIC | Age: 33
End: 2023-11-17

## 2023-11-17 VITALS
TEMPERATURE: 98 F | OXYGEN SATURATION: 100 % | WEIGHT: 220.8 LBS | DIASTOLIC BLOOD PRESSURE: 72 MMHG | HEART RATE: 99 BPM | SYSTOLIC BLOOD PRESSURE: 115 MMHG | BODY MASS INDEX: 37.9 KG/M2

## 2023-11-17 DIAGNOSIS — O24.414 INSULIN CONTROLLED GESTATIONAL DIABETES MELLITUS (GDM) DURING PREGNANCY, ANTEPARTUM: ICD-10-CM

## 2023-11-17 NOTE — PROGRESS NOTES
OB Postpartum Visit    HPI:   Patient is a 35 y.o. [id number] s/p vaginal delivery here for her postpartum visit:     Pt doing well today. Bleeding thin lochia. Bowel function is normal. Bladder function is normal.   Patient is not sexually active. Contraception method is vasectomy. She reports no issues with her mood. Baby has been doing well without problems. Baby is feeding breast and supplementing     Review of Systems   Reports vaginal bleeding. OB History  OB History    Para Term  AB Living   5 4 4   1 4   SAB IAB Ectopic Molar Multiple Live Births   1       0 4      # Outcome Date GA Lbr Jose/2nd Weight Sex Delivery Anes PTL Lv   5 Term 23 38w2d 03:53 / 01:20 3. 142 kg (6 lb 14.8 oz) M Vag-Spont EPI N ALEJANDRA   4 Term 21 39w4d 19:14 / 00:08 3.916 kg (8 lb 10.1 oz) F Vag-Spont None N ALEJANDRA      Birth Comments: arteaga; ID Band # [id number]   3 SAB 20    U       2 Term 19 39w3d 05:00 / 00:17 3.637 kg (8 lb 0.3 oz) M Vag-Spont None N ALEJANDRA      Birth Comments: ARTEAGA   1 Term 17 40w3d  3.118 kg (6 lb 14 oz) M Vag-Spont EPI N ALEJANDRA       Medications:  Current Outpatient Medications on File Prior to Visit   Medication Sig Dispense Refill    ferrous sulfate (IRON 325) 325 (65 Fe) MG tablet Take 1 tablet by mouth daily (with breakfast) 30 tablet 5    docusate sodium (COLACE, DULCOLAX) 100 MG CAPS Take 100 mg by mouth 2 times daily 180 capsule 0    Alcohol Swabs (CVS PREP) 70 % PADS APPLY ROUTE 4 TIMES DAILY 120 each 1    insulin NPH (HUMULIN N;NOVOLIN N) 100 UNIT/ML injection pen Inject 12 Units into the skin every morning 5 Adjustable Dose Pre-filled Pen Syringe 3    Insulin Pen Needle 32G X 4 MM MISC 1 each by Does not apply route daily 100 each 3    famotidine (PEPCID) 20 MG tablet Take 1 tablet by mouth 2 times daily 60 tablet 1    Respiratory Therapy Supplies (NEBULIZER/TUBING/MOUTHPIECE) KIT 1 kit by Does not apply route daily as needed (asthma flare up) 1 kit 0

## 2023-11-21 ENCOUNTER — TELEPHONE (OUTPATIENT)
Dept: OBGYN | Age: 33
End: 2023-11-21

## 2023-11-22 NOTE — TELEPHONE ENCOUNTER
Called answering service asking about benadryl. Called patient back and confirmed name and date of birth. Discussed with patient possible suppression of milk supply. Also discussed risk with Allegra. She reports that she has had to use albuterol more frequently. Encouraged patient to call PCP if symptoms fail to improve as well as seek immediate medical evaluation if she were to develop respiratory distress. She verbalized understanding and had no further questions. Please check to see how the patient is doing.

## 2023-12-15 ENCOUNTER — POSTPARTUM VISIT (OUTPATIENT)
Dept: OBGYN CLINIC | Age: 33
End: 2023-12-15

## 2023-12-15 VITALS
HEIGHT: 64 IN | DIASTOLIC BLOOD PRESSURE: 82 MMHG | SYSTOLIC BLOOD PRESSURE: 132 MMHG | BODY MASS INDEX: 40.46 KG/M2 | OXYGEN SATURATION: 98 % | WEIGHT: 237 LBS | HEART RATE: 71 BPM | TEMPERATURE: 98 F

## 2023-12-15 DIAGNOSIS — Z86.32 HISTORY OF GESTATIONAL DIABETES: ICD-10-CM

## 2023-12-15 PROBLEM — O44.03 PLACENTA PREVIA IN THIRD TRIMESTER: Status: RESOLVED | Noted: 2023-07-28 | Resolved: 2023-12-15

## 2023-12-15 PROBLEM — J45.909 ASTHMA COMPLICATING PREGNANCY IN THIRD TRIMESTER: Status: RESOLVED | Noted: 2023-04-04 | Resolved: 2023-12-15

## 2023-12-15 PROBLEM — O99.513 ASTHMA COMPLICATING PREGNANCY IN THIRD TRIMESTER: Status: RESOLVED | Noted: 2023-04-04 | Resolved: 2023-12-15

## 2023-12-15 PROBLEM — O24.414 INSULIN CONTROLLED GESTATIONAL DIABETES MELLITUS (GDM) IN THIRD TRIMESTER: Status: RESOLVED | Noted: 2023-10-02 | Resolved: 2023-12-15

## 2023-12-15 PROBLEM — O99.213 SEVERE OBESITY DUE TO EXCESS CALORIES AFFECTING PREGNANCY IN THIRD TRIMESTER (HCC): Status: RESOLVED | Noted: 2017-05-03 | Resolved: 2023-12-15

## 2023-12-15 PROBLEM — O44.40 LOW-LYING PLACENTA: Status: RESOLVED | Noted: 2023-06-21 | Resolved: 2023-12-15

## 2023-12-15 PROBLEM — Z34.93 PRENATAL CARE IN THIRD TRIMESTER: Status: RESOLVED | Noted: 2023-04-04 | Resolved: 2023-12-15

## 2023-12-15 PROBLEM — O99.013 ANEMIA DURING PREGNANCY IN THIRD TRIMESTER: Status: RESOLVED | Noted: 2023-10-15 | Resolved: 2023-12-15

## 2023-12-15 PROBLEM — E66.01 SEVERE OBESITY DUE TO EXCESS CALORIES AFFECTING PREGNANCY IN THIRD TRIMESTER (HCC): Status: RESOLVED | Noted: 2017-05-03 | Resolved: 2023-12-15

## 2023-12-15 PROBLEM — O36.63X0 MACROSOMIA AFFECTING MANAGEMENT OF MOTHER IN THIRD TRIMESTER: Status: RESOLVED | Noted: 2023-10-15 | Resolved: 2023-12-15

## 2023-12-15 RX ORDER — PREDNISONE 20 MG/1
40 TABLET ORAL DAILY
COMMUNITY
Start: 2023-12-09

## 2023-12-15 RX ORDER — CEFDINIR 300 MG/1
300 CAPSULE ORAL EVERY 12 HOURS
COMMUNITY
Start: 2023-12-09

## 2023-12-15 NOTE — PROGRESS NOTES
Postpartum Visit    Subjective:  35 y.o. [id number] female S/P uncomplicated Vaginal Delivery on 11/3/23 here for postpartum visit. The pregnancy was complicated by  GDM-insulin, unstable lie, maternal obesity, fetal macrosomia, resolved previa . Postpartum course has been uncomplicated. Bleeding has resoled. Bowel function is normal. Bladder function is normal. Patient is not sexually active. Contraception method is abstinence. Baby has been doing well without problems. Baby is feeding by breast and bottle. No other complaints are noted including headache, change in vision, fever, chills, chest pain, shortness of breath, nausea, vomiting, diarrhea or constipation. The patient denies any urinary complaints or calf tenderness. Review of Systems - The following ROS was otherwise negative, except as noted in the HPI: constitutional, respiratory, cardiovascular, gastrointestinal, genitourinary    Objective:  GENERAL APPEARANCE: alert, well appearing, in no apparent distress  LUNGS: clear to auscultation, no wheezes, rales or rhonchi, symmetric air entry  HEART: regular rate and rhythm  ABDOMEN POSTPARTUM: soft, nontender, no rebound/guarding, no masses  EXTREMITIES: no redness or tenderness in the calves or thighs, mild persistent lower extremity edema    MWQ: 0, no SI/HI    Impression:  35 y.o. [id number] s/p vaginal delivery on 11/3/23 here for her postpartum visit:    Plan:   Diagnosis Orders   1. Postpartum state        2.  History of gestational diabetes          Doing well, routine care  - Feeding: breast and bottle  - BCM: decilnes, not currently sexually active  - Moods: no signs/sx PPD, denies SI/HI  - Bleeding: resolved    Can not do 2hr GTT 2/2 h/o sleeve, plan to trend BG at 12 weeks PP and also A1c    Dispo: PRN or for annual exam/pap in June 2024  Nakul Fan MD

## 2024-01-23 ENCOUNTER — OFFICE VISIT (OUTPATIENT)
Dept: FAMILY MEDICINE CLINIC | Age: 34
End: 2024-01-23
Payer: COMMERCIAL

## 2024-01-23 VITALS
SYSTOLIC BLOOD PRESSURE: 104 MMHG | OXYGEN SATURATION: 99 % | BODY MASS INDEX: 39.65 KG/M2 | DIASTOLIC BLOOD PRESSURE: 70 MMHG | HEART RATE: 83 BPM | WEIGHT: 231 LBS | TEMPERATURE: 98.2 F

## 2024-01-23 DIAGNOSIS — D50.8 IRON DEFICIENCY ANEMIA SECONDARY TO INADEQUATE DIETARY IRON INTAKE: ICD-10-CM

## 2024-01-23 DIAGNOSIS — N28.1 RENAL CYST, RIGHT: ICD-10-CM

## 2024-01-23 DIAGNOSIS — J45.20 MILD INTERMITTENT ASTHMA WITHOUT COMPLICATION: ICD-10-CM

## 2024-01-23 DIAGNOSIS — R53.82 CHRONIC FATIGUE: Primary | ICD-10-CM

## 2024-01-23 PROCEDURE — G8417 CALC BMI ABV UP PARAM F/U: HCPCS | Performed by: FAMILY MEDICINE

## 2024-01-23 PROCEDURE — 36415 COLL VENOUS BLD VENIPUNCTURE: CPT | Performed by: FAMILY MEDICINE

## 2024-01-23 PROCEDURE — 1036F TOBACCO NON-USER: CPT | Performed by: FAMILY MEDICINE

## 2024-01-23 PROCEDURE — G8484 FLU IMMUNIZE NO ADMIN: HCPCS | Performed by: FAMILY MEDICINE

## 2024-01-23 PROCEDURE — 99214 OFFICE O/P EST MOD 30 MIN: CPT | Performed by: FAMILY MEDICINE

## 2024-01-23 PROCEDURE — G8427 DOCREV CUR MEDS BY ELIG CLIN: HCPCS | Performed by: FAMILY MEDICINE

## 2024-01-23 ASSESSMENT — PATIENT HEALTH QUESTIONNAIRE - PHQ9
SUM OF ALL RESPONSES TO PHQ QUESTIONS 1-9: 8
4. FEELING TIRED OR HAVING LITTLE ENERGY: 2
9. THOUGHTS THAT YOU WOULD BE BETTER OFF DEAD, OR OF HURTING YOURSELF: NOT AT ALL
SUM OF ALL RESPONSES TO PHQ QUESTIONS 1-9: 8
2. FEELING DOWN, DEPRESSED OR HOPELESS: 0
7. TROUBLE CONCENTRATING ON THINGS, SUCH AS READING THE NEWSPAPER OR WATCHING TELEVISION: 2
9. THOUGHTS THAT YOU WOULD BE BETTER OFF DEAD, OR OF HURTING YOURSELF: 0
8. MOVING OR SPEAKING SO SLOWLY THAT OTHER PEOPLE COULD HAVE NOTICED. OR THE OPPOSITE - BEING SO FIDGETY OR RESTLESS THAT YOU HAVE BEEN MOVING AROUND A LOT MORE THAN USUAL: NOT AT ALL
5. POOR APPETITE OR OVEREATING: SEVERAL DAYS
10. IF YOU CHECKED OFF ANY PROBLEMS, HOW DIFFICULT HAVE THESE PROBLEMS MADE IT FOR YOU TO DO YOUR WORK, TAKE CARE OF THINGS AT HOME, OR GET ALONG WITH OTHER PEOPLE: SOMEWHAT DIFFICULT
SUM OF ALL RESPONSES TO PHQ QUESTIONS 1-9: 8
2. FEELING DOWN, DEPRESSED OR HOPELESS: NOT AT ALL
10. IF YOU CHECKED OFF ANY PROBLEMS, HOW DIFFICULT HAVE THESE PROBLEMS MADE IT FOR YOU TO DO YOUR WORK, TAKE CARE OF THINGS AT HOME, OR GET ALONG WITH OTHER PEOPLE: 1
5. POOR APPETITE OR OVEREATING: 1
1. LITTLE INTEREST OR PLEASURE IN DOING THINGS: 0
SUM OF ALL RESPONSES TO PHQ QUESTIONS 1-9: 8
7. TROUBLE CONCENTRATING ON THINGS, SUCH AS READING THE NEWSPAPER OR WATCHING TELEVISION: MORE THAN HALF THE DAYS
4. FEELING TIRED OR HAVING LITTLE ENERGY: MORE THAN HALF THE DAYS
6. FEELING BAD ABOUT YOURSELF - OR THAT YOU ARE A FAILURE OR HAVE LET YOURSELF OR YOUR FAMILY DOWN: SEVERAL DAYS
3. TROUBLE FALLING OR STAYING ASLEEP: MORE THAN HALF THE DAYS
SUM OF ALL RESPONSES TO PHQ9 QUESTIONS 1 & 2: 0
1. LITTLE INTEREST OR PLEASURE IN DOING THINGS: NOT AT ALL
8. MOVING OR SPEAKING SO SLOWLY THAT OTHER PEOPLE COULD HAVE NOTICED. OR THE OPPOSITE, BEING SO FIGETY OR RESTLESS THAT YOU HAVE BEEN MOVING AROUND A LOT MORE THAN USUAL: 0
6. FEELING BAD ABOUT YOURSELF - OR THAT YOU ARE A FAILURE OR HAVE LET YOURSELF OR YOUR FAMILY DOWN: 1
3. TROUBLE FALLING OR STAYING ASLEEP: 2
SUM OF ALL RESPONSES TO PHQ QUESTIONS 1-9: 8

## 2024-01-23 NOTE — PROGRESS NOTES
She presents today for a couple different issues.  She delivered her baby a couple months ago and she has been having extreme fatigue and not feeling well.  She is going to college and has 2 jobs and the baby but she states she still gets sleepy.  She did have significant iron deficiency anemia during her pregnancy and hemoglobin was down in the 9 range when I reviewed her numbers. Her last blood work was done back in September which I reviewed.  She states her asthma has been flared up every morning about a month ago she went to urgent care and was given antibiotics and steroids but she still has morning symptoms despite taking her Symbicort.  On further questioning she is allergic to cats and she has 4 cats in her bedroom at night she states she has to leave the door open at night since she needs to hear her baby and she is not taking her allergy medicines right now because she is breast-feeding.  She also had history of gestational diabetes and needs follow-up A1c    Tests and documents reviewed today: Reviewed MRI in 2020 which showed a simple renal cyst and also reviewed last labs done by OB during her pregnancy in September.     Objective:   /70   Pulse 83   Temp 98.2 °F (36.8 °C) (Oral)   Wt 104.8 kg (231 lb)   SpO2 99%   BMI 39.65 kg/m²   BP Readings from Last 3 Encounters:   01/23/24 104/70   12/15/23 132/82   11/17/23 115/72     Physical Exam  Vitals reviewed.   Constitutional:       Appearance: She is well-developed. She is not toxic-appearing.   HENT:      Head: Normocephalic.   Neck:      Thyroid: No thyroid mass or thyromegaly.   Cardiovascular:      Rate and Rhythm: Normal rate and regular rhythm.      Heart sounds: Normal heart sounds. No murmur heard.  Pulmonary:      Effort: No respiratory distress.      Breath sounds: Normal breath sounds. No wheezing or rales.   Abdominal:      General: There is no distension.      Palpations: Abdomen is soft. There is no mass.      Tenderness: There

## 2024-01-23 NOTE — PATIENT INSTRUCTIONS
Please read the healthy family handout that you were given and share it with your family.       Please compare this printed medication list with your medications at home to be sure they are the same.  If you have any medications that are different please contact us immediately at [phone].     Also review your allergies that we have listed, these may also include medications that you have not been able to tolerate, make sure everything listed is correct. If you have any allergies that are different please contact us immediately at [phone].     You may receive a survey in the mail or by email asking about your experience during your visit today. Please complete and return to us so we know how we are serving you.

## 2024-01-24 LAB
ALBUMIN SERPL-MCNC: 4.5 G/DL (ref 3.4–5)
ALBUMIN/GLOB SERPL: 2 {RATIO} (ref 1.1–2.2)
ALP SERPL-CCNC: 74 U/L (ref 40–129)
ALT SERPL-CCNC: 8 U/L (ref 10–40)
ANION GAP SERPL CALCULATED.3IONS-SCNC: 10 MMOL/L (ref 3–16)
AST SERPL-CCNC: 10 U/L (ref 15–37)
BILIRUB SERPL-MCNC: <0.2 MG/DL (ref 0–1)
BUN SERPL-MCNC: 16 MG/DL (ref 7–20)
CALCIUM SERPL-MCNC: 8.7 MG/DL (ref 8.3–10.6)
CHLORIDE SERPL-SCNC: 105 MMOL/L (ref 99–110)
CO2 SERPL-SCNC: 27 MMOL/L (ref 21–32)
CREAT SERPL-MCNC: 0.5 MG/DL (ref 0.6–1.1)
DEPRECATED RDW RBC AUTO: 18.3 % (ref 12.4–15.4)
EST. AVERAGE GLUCOSE BLD GHB EST-MCNC: 93.9 MG/DL
FERRITIN SERPL IA-MCNC: 5.7 NG/ML (ref 15–150)
GFR SERPLBLD CREATININE-BSD FMLA CKD-EPI: >60 ML/MIN/{1.73_M2}
GLUCOSE SERPL-MCNC: 85 MG/DL (ref 70–99)
HBA1C MFR BLD: 4.9 %
HCT VFR BLD AUTO: 31.7 % (ref 36–48)
HGB BLD-MCNC: 10 G/DL (ref 12–16)
MCH RBC QN AUTO: 22.2 PG (ref 26–34)
MCHC RBC AUTO-ENTMCNC: 31.6 G/DL (ref 31–36)
MCV RBC AUTO: 70.5 FL (ref 80–100)
PLATELET # BLD AUTO: 246 K/UL (ref 135–450)
PMV BLD AUTO: 10.3 FL (ref 5–10.5)
POTASSIUM SERPL-SCNC: 4.1 MMOL/L (ref 3.5–5.1)
PROT SERPL-MCNC: 6.7 G/DL (ref 6.4–8.2)
RBC # BLD AUTO: 4.49 M/UL (ref 4–5.2)
SODIUM SERPL-SCNC: 142 MMOL/L (ref 136–145)
TSH SERPL DL<=0.005 MIU/L-ACNC: 0.37 UIU/ML (ref 0.27–4.2)
WBC # BLD AUTO: 7.4 K/UL (ref 4–11)

## 2024-01-25 DIAGNOSIS — D50.8 IRON DEFICIENCY ANEMIA SECONDARY TO INADEQUATE DIETARY IRON INTAKE: Primary | ICD-10-CM

## 2024-01-25 RX ORDER — FERROUS SULFATE 325(65) MG
325 TABLET ORAL 2 TIMES DAILY
Qty: 60 TABLET | Refills: 5 | Status: SHIPPED | OUTPATIENT
Start: 2024-01-25

## 2024-02-15 ENCOUNTER — OFFICE VISIT (OUTPATIENT)
Dept: BARIATRICS/WEIGHT MGMT | Age: 34
End: 2024-02-15
Payer: COMMERCIAL

## 2024-02-15 VITALS
HEIGHT: 64 IN | DIASTOLIC BLOOD PRESSURE: 76 MMHG | BODY MASS INDEX: 39.95 KG/M2 | HEART RATE: 80 BPM | SYSTOLIC BLOOD PRESSURE: 118 MMHG | RESPIRATION RATE: 18 BRPM | OXYGEN SATURATION: 98 % | WEIGHT: 234 LBS

## 2024-02-15 DIAGNOSIS — E66.01 MORBID OBESITY WITH BMI OF 40.0-44.9, ADULT (HCC): ICD-10-CM

## 2024-02-15 DIAGNOSIS — E78.2 MIXED HYPERLIPIDEMIA: ICD-10-CM

## 2024-02-15 DIAGNOSIS — R73.03 PREDIABETES: ICD-10-CM

## 2024-02-15 DIAGNOSIS — Z98.84 S/P LAPAROSCOPIC SLEEVE GASTRECTOMY: Primary | ICD-10-CM

## 2024-02-15 PROCEDURE — G8417 CALC BMI ABV UP PARAM F/U: HCPCS | Performed by: SURGERY

## 2024-02-15 PROCEDURE — 99214 OFFICE O/P EST MOD 30 MIN: CPT | Performed by: SURGERY

## 2024-02-15 PROCEDURE — 1036F TOBACCO NON-USER: CPT | Performed by: SURGERY

## 2024-02-15 PROCEDURE — G8484 FLU IMMUNIZE NO ADMIN: HCPCS | Performed by: SURGERY

## 2024-02-15 PROCEDURE — G8427 DOCREV CUR MEDS BY ELIG CLIN: HCPCS | Performed by: SURGERY

## 2024-02-15 NOTE — PROGRESS NOTES
[facility]   [facility]  Lynsey Chou MD, FACS, [address], [address]    [facility] [address] .  Phone: [phone]  Fax: [phone]            Chief Complaint   Patient presents with    Bariatrics Post Op Follow Up     1yr9mo s/p sleeve 22           HPI:    Johana Alvarado is a very pleasant 33 y.o.  female , Body mass index is 40.17 kg/m².. And multiple medical problems who is presenting for bariatric follow up care.   Johana is s/p laparoscopic sleeve gastrectomy by me 2022. Initial Weight: 290 lbs, Weight Loss: 57 lbs.   Comes today to the clinic without any complaints. Patient denies any nausea, vomiting, fevers, chills, shortness of breath, chest pain, constipation or urinary symptoms. Denies any heartburn nor dysphagia.  Patient informed us today that they are NOT taking the multivitamins as instructed.  Patient denies any tingling, weakness,  numbness nor any neurological symptoms.  Johana is feeling very well, and is very active. Patient is very pleased with the weight loss and resolution of co-morbid conditions.      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

## 2024-02-15 NOTE — PATIENT INSTRUCTIONS
Patient received dietary handouts and education.    Goals:   - Continue current eating patterns including protein and plants with all meals and snacks  - Take 2 over the counter MVI/day and 1000 mg Calcium Citrate/day  - Follow up with other providers regarding fatigue and dizziness

## 2024-02-15 NOTE — PROGRESS NOTES
Dietary Assessment Note      Vitals:   Vitals:    02/15/24 1440   BP: 118/76   Pulse: 80   Resp: 18   SpO2: 98%   Weight: 106.1 kg (234 lb)   Height: 1.626 m (5' 4\")   Patient gained 32 lbs over 9 months.    Total Weight Loss: 57 lbs    Labs reviewed: Labs reviewed 24   Latest Reference Range & Units 24 16:35   Ferritin 15.0 - 150.0 ng/mL 5.7 (L)     Protein intake: 60-80 grams/day    Fluid intake: 80-96 oz water     Multivitamin/mineral intake: none    Calcium intake: none    Other: Iron    Exercise: light weight strength/resistance training 2-3X/week    Nutrition Assessment: 1 year 9 months post-op visit. Pt had a baby in 2023 w/ GDM, A1c 4.9 as of January. Tracking [phone] kcal. Pt reports working 3 jobs, extreme fatigue, sleeping too much, dizzy upon standing and during workouts.   Breakfast: 7-8 AM: Premier Protein Shake OR egg muffin sandwich  Snack: Harris   Lunch: 12-1 PM: Snack pack w/ apple/cheese/grapes + single serving cup PB  Snack: None  Dinner: 6:30-7 PM: chickpeas w/ rice/squash and homemade dressing w/ lemon juice/spices/greek yogurt  Snack: None OR Fruit OR Nuts OR Power crunch protein bar    Amount able to eat per sittin.5-2 cup volume  Following 30/30/30 rule: Eating over 15-20 minutes. Avoids eating and drinking at the same time.    Food Intolerances/issues: meat     Client Concerns: Feels she has failed and just keeps gaining wt  Handouts: 1200 kcal POMP    Goals:   - Continue current eating patterns including protein and plants with all meals and snacks  - Take 2 over the counter MVI/day and 1000 mg Calcium Citrate/day  - Follow up with other providers regarding fatigue and dizziness     Plan: Follow up at 2 years post op and as needed    Tami Virgen, CAROLINE, LD

## 2024-02-27 ENCOUNTER — OFFICE VISIT (OUTPATIENT)
Dept: FAMILY MEDICINE CLINIC | Age: 34
End: 2024-02-27
Payer: COMMERCIAL

## 2024-02-27 VITALS
BODY MASS INDEX: 38.15 KG/M2 | OXYGEN SATURATION: 98 % | SYSTOLIC BLOOD PRESSURE: 106 MMHG | HEART RATE: 86 BPM | TEMPERATURE: 98.1 F | WEIGHT: 229 LBS | HEIGHT: 65 IN | DIASTOLIC BLOOD PRESSURE: 72 MMHG

## 2024-02-27 DIAGNOSIS — R50.9 FEVER, UNSPECIFIED FEVER CAUSE: ICD-10-CM

## 2024-02-27 DIAGNOSIS — J02.9 SORE THROAT: ICD-10-CM

## 2024-02-27 DIAGNOSIS — R05.1 ACUTE COUGH: ICD-10-CM

## 2024-02-27 DIAGNOSIS — R68.89 FLU-LIKE SYMPTOMS: Primary | ICD-10-CM

## 2024-02-27 LAB
INFLUENZA A ANTIBODY: NEGATIVE
INFLUENZA B ANTIBODY: NEGATIVE
Lab: NORMAL
QC PASS/FAIL: NORMAL
S PYO AG THROAT QL: NORMAL
SARS-COV-2 RDRP RESP QL NAA+PROBE: NEGATIVE

## 2024-02-27 PROCEDURE — G8484 FLU IMMUNIZE NO ADMIN: HCPCS | Performed by: NURSE PRACTITIONER

## 2024-02-27 PROCEDURE — G8427 DOCREV CUR MEDS BY ELIG CLIN: HCPCS | Performed by: NURSE PRACTITIONER

## 2024-02-27 PROCEDURE — 87635 SARS-COV-2 COVID-19 AMP PRB: CPT | Performed by: NURSE PRACTITIONER

## 2024-02-27 PROCEDURE — 1036F TOBACCO NON-USER: CPT | Performed by: NURSE PRACTITIONER

## 2024-02-27 PROCEDURE — 87804 INFLUENZA ASSAY W/OPTIC: CPT | Performed by: NURSE PRACTITIONER

## 2024-02-27 PROCEDURE — 99214 OFFICE O/P EST MOD 30 MIN: CPT | Performed by: NURSE PRACTITIONER

## 2024-02-27 PROCEDURE — G8417 CALC BMI ABV UP PARAM F/U: HCPCS | Performed by: NURSE PRACTITIONER

## 2024-02-27 PROCEDURE — 87880 STREP A ASSAY W/OPTIC: CPT | Performed by: NURSE PRACTITIONER

## 2024-02-27 RX ORDER — BENZONATATE 200 MG/1
200 CAPSULE ORAL 3 TIMES DAILY PRN
Qty: 30 CAPSULE | Refills: 0 | Status: SHIPPED | OUTPATIENT
Start: 2024-02-27 | End: 2024-03-08

## 2024-02-27 ASSESSMENT — ENCOUNTER SYMPTOMS
SINUS PRESSURE: 1
EYES NEGATIVE: 1
SORE THROAT: 1
DIARRHEA: 1
COUGH: 1

## 2024-02-27 NOTE — PROGRESS NOTES
Subjective:      Patient ID: Johana Alvarado is a 33 y.o. female.    Chief Complaint   Patient presents with    Influenza        HPI        Johana Alvarado is a 33 y.o. female who presents for evaluation of sore throat. Associated symptoms include low grade fevers, chills, headache, nasal blockage, post nasal drip, sinus and nasal congestion, and sore throat. Onset of symptoms was 2 days ago, rapidly worsening since that time.  She is drinking plenty of fluids. She has had recent close exposure to someone with proven streptococcal pharyngitis.  Patient's medications, allergies, past medical, surgical, social and family histories were reviewed and updated as appropriate.    Review of Systems   Constitutional:  Positive for appetite change, chills and fever.   HENT:  Positive for congestion, sinus pressure and sore throat.    Eyes: Negative.    Respiratory:  Positive for cough.    Cardiovascular: Negative.    Gastrointestinal:  Positive for diarrhea.   Endocrine: Negative.    Genitourinary: Negative.    Musculoskeletal:  Positive for myalgias.   Skin: Negative.    Neurological:  Positive for headaches.   Psychiatric/Behavioral: Negative.         Patient Active Problem List   Diagnosis    PTSD (post-traumatic stress disorder)    Essential and other specified forms of tremor    PCOS (polycystic ovarian syndrome)    Midline thoracic back pain    Allergic rhinitis    Morbid obesity with BMI of 40.0-44.9, adult (HCC)    Left carpal tunnel syndrome    Chronic pain of both knees    Major depressive disorder with single episode, in partial remission (HCC)    Attention deficit hyperactivity disorder (ADHD), combined type    Bilateral leg edema    Vitamin D deficiency    Mixed hyperlipidemia    Iron deficiency anemia    Prediabetes    Vitamin B1 deficiency    S/P unilateral salpingo-oophorectomy    Palpitations    Hiatal hernia    Paroxysmal tachycardia (HCC)    Intra-abdominal adhesions    S/P laparoscopic sleeve gastrectomy

## 2024-03-02 LAB — BACTERIA THROAT AEROBE CULT: NORMAL

## 2024-03-22 ENCOUNTER — NURSE ONLY (OUTPATIENT)
Dept: FAMILY MEDICINE CLINIC | Age: 34
End: 2024-03-22
Payer: COMMERCIAL

## 2024-03-22 DIAGNOSIS — D50.8 IRON DEFICIENCY ANEMIA SECONDARY TO INADEQUATE DIETARY IRON INTAKE: ICD-10-CM

## 2024-03-22 PROCEDURE — 36415 COLL VENOUS BLD VENIPUNCTURE: CPT | Performed by: FAMILY MEDICINE

## 2024-03-22 NOTE — PROGRESS NOTES
Blood drawn per physician order. 21 gauge needle used. Location left ac space w/o incident.  Pressure applied until bleeding stopped.  Bandaid applied.  Patient instructed to call or return if problems with bleeding.   2 tubes drawn.

## 2024-03-23 LAB
BASOPHILS # BLD: 0.1 K/UL (ref 0–0.2)
BASOPHILS NFR BLD: 0.8 %
DEPRECATED RDW RBC AUTO: 17.3 % (ref 12.4–15.4)
EOSINOPHIL # BLD: 0.3 K/UL (ref 0–0.6)
EOSINOPHIL NFR BLD: 4.1 %
FERRITIN SERPL IA-MCNC: 6.4 NG/ML (ref 15–150)
HCT VFR BLD AUTO: 30.9 % (ref 36–48)
HGB BLD-MCNC: 9.8 G/DL (ref 12–16)
LYMPHOCYTES # BLD: 1.9 K/UL (ref 1–5.1)
LYMPHOCYTES NFR BLD: 25.1 %
MCH RBC QN AUTO: 22.1 PG (ref 26–34)
MCHC RBC AUTO-ENTMCNC: 31.8 G/DL (ref 31–36)
MCV RBC AUTO: 69.4 FL (ref 80–100)
MONOCYTES # BLD: 0.3 K/UL (ref 0–1.3)
MONOCYTES NFR BLD: 4.1 %
NEUTROPHILS # BLD: 4.9 K/UL (ref 1.7–7.7)
NEUTROPHILS NFR BLD: 65.9 %
PATH INTERP BLD-IMP: NO
PLATELET # BLD AUTO: 279 K/UL (ref 135–450)
PMV BLD AUTO: 11.5 FL (ref 5–10.5)
RBC # BLD AUTO: 4.46 M/UL (ref 4–5.2)
WBC # BLD AUTO: 7.4 K/UL (ref 4–11)

## 2024-04-23 ENCOUNTER — OFFICE VISIT (OUTPATIENT)
Dept: FAMILY MEDICINE CLINIC | Age: 34
End: 2024-04-23
Payer: COMMERCIAL

## 2024-04-23 VITALS
BODY MASS INDEX: 40.22 KG/M2 | SYSTOLIC BLOOD PRESSURE: 104 MMHG | DIASTOLIC BLOOD PRESSURE: 74 MMHG | WEIGHT: 241.4 LBS | RESPIRATION RATE: 16 BRPM | HEART RATE: 83 BPM | HEIGHT: 65 IN | OXYGEN SATURATION: 98 %

## 2024-04-23 DIAGNOSIS — F32.4 MAJOR DEPRESSIVE DISORDER WITH SINGLE EPISODE, IN PARTIAL REMISSION (HCC): ICD-10-CM

## 2024-04-23 DIAGNOSIS — M54.50 ACUTE LEFT-SIDED LOW BACK PAIN WITHOUT SCIATICA: ICD-10-CM

## 2024-04-23 DIAGNOSIS — D50.8 IRON DEFICIENCY ANEMIA SECONDARY TO INADEQUATE DIETARY IRON INTAKE: Primary | ICD-10-CM

## 2024-04-23 DIAGNOSIS — F43.10 PTSD (POST-TRAUMATIC STRESS DISORDER): ICD-10-CM

## 2024-04-23 DIAGNOSIS — M51.36 DDD (DEGENERATIVE DISC DISEASE), LUMBAR: ICD-10-CM

## 2024-04-23 PROCEDURE — G8417 CALC BMI ABV UP PARAM F/U: HCPCS | Performed by: NURSE PRACTITIONER

## 2024-04-23 PROCEDURE — 1036F TOBACCO NON-USER: CPT | Performed by: NURSE PRACTITIONER

## 2024-04-23 PROCEDURE — 99214 OFFICE O/P EST MOD 30 MIN: CPT | Performed by: NURSE PRACTITIONER

## 2024-04-23 PROCEDURE — G8427 DOCREV CUR MEDS BY ELIG CLIN: HCPCS | Performed by: NURSE PRACTITIONER

## 2024-04-23 RX ORDER — FERUMOXYTOL 510 MG/17ML
510 INJECTION INTRAVENOUS
COMMUNITY
Start: 2024-04-12 | End: 2024-04-23

## 2024-04-23 RX ORDER — FERRIC DERISOMALTOSE 1000 MG/10ML
1000 SOLUTION INTRAVENOUS
COMMUNITY
Start: 2024-04-12 | End: 2024-04-23

## 2024-04-23 RX ORDER — BENZOCAINE/MENTHOL 6 MG-10 MG
LOZENGE MUCOUS MEMBRANE 2 TIMES DAILY
COMMUNITY
Start: 2024-02-09

## 2024-04-23 RX ORDER — DOCUSATE SODIUM 100 MG/1
100 CAPSULE, LIQUID FILLED ORAL 2 TIMES DAILY
COMMUNITY
Start: 2024-02-09

## 2024-04-23 RX ORDER — ALBUTEROL SULFATE 90 UG/1
AEROSOL, METERED RESPIRATORY (INHALATION)
COMMUNITY
Start: 2024-04-14

## 2024-04-23 ASSESSMENT — ENCOUNTER SYMPTOMS
RESPIRATORY NEGATIVE: 1
CHEST TIGHTNESS: 0
EYES NEGATIVE: 1
COUGH: 0
ALLERGIC/IMMUNOLOGIC NEGATIVE: 1
GASTROINTESTINAL NEGATIVE: 1
BACK PAIN: 1
SHORTNESS OF BREATH: 0

## 2024-04-23 NOTE — PROGRESS NOTES
Eyes:      Extraocular Movements: Extraocular movements intact.      Conjunctiva/sclera: Conjunctivae normal.   Cardiovascular:      Rate and Rhythm: Normal rate and regular rhythm.      Pulses: Normal pulses.      Heart sounds: Normal heart sounds.   Pulmonary:      Effort: Pulmonary effort is normal. No accessory muscle usage or respiratory distress.      Breath sounds: Normal breath sounds.   Abdominal:      General: Bowel sounds are normal.      Palpations: Abdomen is soft.   Musculoskeletal:      Cervical back: Normal range of motion and neck supple.      Right lower leg: No edema.      Left lower leg: No edema.   Skin:     General: Skin is warm and dry.      Findings: No rash.   Neurological:      General: No focal deficit present.      Mental Status: She is alert and oriented to person, place, and time. Mental status is at baseline.   Psychiatric:         Attention and Perception: Attention normal.         Mood and Affect: Mood normal.         Speech: Speech normal.         Behavior: Behavior normal. Behavior is cooperative.         Assessment/Plan:   1. Iron deficiency anemia secondary to inadequate dietary iron intake  Advised patient to contact her insurance to find out who would be in network and I will be happy to provide her with a referral to hematology.  Patient verbalized understanding and agreeable to plan.    2. DDD (degenerative disc disease), lumbar  Patient with complaints of chronic low back pain.  Patient unable to take NSAID and reports acetaminophen has not been effective.  Discussed trialing topicals and or over-the-counter joint supplements such as Osteo Bi-Flex.  Discussed benefits of routine exercise, healthy diet and recommend patient follow-up with physical therapy.  Patient verbalized understanding and agreeable to plan.  - [facility]    3. Acute left-sided low back pain without sciatica  See #2  - [facility] ([facility]

## 2024-04-27 ENCOUNTER — HOSPITAL ENCOUNTER (EMERGENCY)
Age: 34
Discharge: HOME | End: 2024-04-28
Attending: STUDENT IN AN ORGANIZED HEALTH CARE EDUCATION/TRAINING PROGRAM
Payer: COMMERCIAL

## 2024-04-27 ENCOUNTER — APPOINTMENT (OUTPATIENT)
Dept: CT IMAGING | Age: 34
End: 2024-04-27
Payer: COMMERCIAL

## 2024-04-27 ENCOUNTER — APPOINTMENT (OUTPATIENT)
Dept: GENERAL RADIOLOGY | Age: 34
End: 2024-04-27
Payer: COMMERCIAL

## 2024-04-27 DIAGNOSIS — R55 SYNCOPE AND COLLAPSE: Primary | ICD-10-CM

## 2024-04-27 LAB
ALBUMIN SERPL-MCNC: 3.9 G/DL (ref 3.4–5)
ALBUMIN/GLOB SERPL: 1.3 {RATIO} (ref 1.1–2.2)
ALP SERPL-CCNC: 77 U/L (ref 40–129)
ALT SERPL-CCNC: 11 U/L (ref 10–40)
ANION GAP SERPL CALCULATED.3IONS-SCNC: 10 MMOL/L (ref 3–16)
ANISOCYTOSIS BLD QL SMEAR: ABNORMAL
AST SERPL-CCNC: 14 U/L (ref 15–37)
BASOPHILS # BLD: 0 K/UL (ref 0–0.2)
BASOPHILS NFR BLD: 0.4 %
BILIRUB SERPL-MCNC: 0.3 MG/DL (ref 0–1)
BILIRUB UR QL STRIP.AUTO: NEGATIVE
BUN SERPL-MCNC: 11 MG/DL (ref 7–20)
CALCIUM SERPL-MCNC: 8.1 MG/DL (ref 8.3–10.6)
CHLORIDE SERPL-SCNC: 103 MMOL/L (ref 99–110)
CLARITY UR: ABNORMAL
CO2 SERPL-SCNC: 24 MMOL/L (ref 21–32)
COLOR UR: YELLOW
CREAT SERPL-MCNC: 0.6 MG/DL (ref 0.6–1.1)
D DIMER: 2.2 UG/ML FEU (ref 0–0.6)
DEPRECATED RDW RBC AUTO: 17.3 % (ref 12.4–15.4)
EKG ATRIAL RATE: 101 BPM
EKG DIAGNOSIS: NORMAL
EKG P AXIS: 81 DEGREES
EKG P-R INTERVAL: 148 MS
EKG Q-T INTERVAL: 314 MS
EKG QRS DURATION: 94 MS
EKG QTC CALCULATION (BAZETT): 407 MS
EKG R AXIS: 89 DEGREES
EKG T AXIS: 80 DEGREES
EKG VENTRICULAR RATE: 101 BPM
EOSINOPHIL # BLD: 0 K/UL (ref 0–0.6)
EOSINOPHIL NFR BLD: 0.2 %
FLUAV RNA UPPER RESP QL NAA+PROBE: NEGATIVE
FLUBV AG NPH QL: NEGATIVE
GFR SERPLBLD CREATININE-BSD FMLA CKD-EPI: >90 ML/MIN/{1.73_M2}
GLUCOSE SERPL-MCNC: 130 MG/DL (ref 70–99)
GLUCOSE UR STRIP.AUTO-MCNC: NEGATIVE MG/DL
HCG SERPL QL: NEGATIVE
HCT VFR BLD AUTO: 32.7 % (ref 36–48)
HGB BLD-MCNC: 10.4 G/DL (ref 12–16)
HGB UR QL STRIP.AUTO: NEGATIVE
KETONES UR STRIP.AUTO-MCNC: NEGATIVE MG/DL
LEUKOCYTE ESTERASE UR QL STRIP.AUTO: NEGATIVE
LYMPHOCYTES # BLD: 0.5 K/UL (ref 1–5.1)
LYMPHOCYTES NFR BLD: 6.1 %
MCH RBC QN AUTO: 21.3 PG (ref 26–34)
MCHC RBC AUTO-ENTMCNC: 31.7 G/DL (ref 31–36)
MCV RBC AUTO: 67.1 FL (ref 80–100)
MICROCYTES BLD QL SMEAR: ABNORMAL
MONOCYTES # BLD: 0.2 K/UL (ref 0–1.3)
MONOCYTES NFR BLD: 3.1 %
NEUTROPHILS # BLD: 6.8 K/UL (ref 1.7–7.7)
NEUTROPHILS NFR BLD: 90.2 %
NITRITE UR QL STRIP.AUTO: NEGATIVE
PATH INTERP BLD-IMP: YES
PH UR STRIP.AUTO: 6 [PH] (ref 5–8)
PLATELET # BLD AUTO: 214 K/UL (ref 135–450)
PLATELET BLD QL SMEAR: ADEQUATE
PMV BLD AUTO: 10 FL (ref 5–10.5)
POTASSIUM SERPL-SCNC: 3.9 MMOL/L (ref 3.5–5.1)
PROT SERPL-MCNC: 6.9 G/DL (ref 6.4–8.2)
PROT UR STRIP.AUTO-MCNC: NEGATIVE MG/DL
RBC # BLD AUTO: 4.87 M/UL (ref 4–5.2)
SARS-COV-2 RDRP RESP QL NAA+PROBE: NOT DETECTED
SLIDE REVIEW: ABNORMAL
SODIUM SERPL-SCNC: 137 MMOL/L (ref 136–145)
SP GR UR STRIP.AUTO: >=1.03 (ref 1–1.03)
TROPONIN, HIGH SENSITIVITY: <6 NG/L (ref 0–14)
UA DIPSTICK W REFLEX MICRO PNL UR: ABNORMAL
URN SPEC COLLECT METH UR: ABNORMAL
UROBILINOGEN UR STRIP-ACNC: 0.2 E.U./DL
WBC # BLD AUTO: 7.6 K/UL (ref 4–11)

## 2024-04-27 PROCEDURE — 71260 CT THORAX DX C+: CPT

## 2024-04-27 PROCEDURE — 6360000002 HC RX W HCPCS: Performed by: STUDENT IN AN ORGANIZED HEALTH CARE EDUCATION/TRAINING PROGRAM

## 2024-04-27 PROCEDURE — 70450 CT HEAD/BRAIN W/O DYE: CPT

## 2024-04-27 PROCEDURE — 85379 FIBRIN DEGRADATION QUANT: CPT

## 2024-04-27 PROCEDURE — 84703 CHORIONIC GONADOTROPIN ASSAY: CPT

## 2024-04-27 PROCEDURE — 80053 COMPREHEN METABOLIC PANEL: CPT

## 2024-04-27 PROCEDURE — 87804 INFLUENZA ASSAY W/OPTIC: CPT

## 2024-04-27 PROCEDURE — 96376 TX/PRO/DX INJ SAME DRUG ADON: CPT

## 2024-04-27 PROCEDURE — 96361 HYDRATE IV INFUSION ADD-ON: CPT

## 2024-04-27 PROCEDURE — 71045 X-RAY EXAM CHEST 1 VIEW: CPT

## 2024-04-27 PROCEDURE — 2580000003 HC RX 258: Performed by: STUDENT IN AN ORGANIZED HEALTH CARE EDUCATION/TRAINING PROGRAM

## 2024-04-27 PROCEDURE — 6360000004 HC RX CONTRAST MEDICATION: Performed by: STUDENT IN AN ORGANIZED HEALTH CARE EDUCATION/TRAINING PROGRAM

## 2024-04-27 PROCEDURE — 96374 THER/PROPH/DIAG INJ IV PUSH: CPT

## 2024-04-27 PROCEDURE — 36415 COLL VENOUS BLD VENIPUNCTURE: CPT

## 2024-04-27 PROCEDURE — 87635 SARS-COV-2 COVID-19 AMP PRB: CPT

## 2024-04-27 PROCEDURE — 85025 COMPLETE CBC W/AUTO DIFF WBC: CPT

## 2024-04-27 PROCEDURE — 84484 ASSAY OF TROPONIN QUANT: CPT

## 2024-04-27 PROCEDURE — 81003 URINALYSIS AUTO W/O SCOPE: CPT

## 2024-04-27 PROCEDURE — 93005 ELECTROCARDIOGRAM TRACING: CPT | Performed by: STUDENT IN AN ORGANIZED HEALTH CARE EDUCATION/TRAINING PROGRAM

## 2024-04-27 PROCEDURE — 99285 EMERGENCY DEPT VISIT HI MDM: CPT

## 2024-04-27 RX ORDER — SODIUM CHLORIDE, SODIUM LACTATE, POTASSIUM CHLORIDE, AND CALCIUM CHLORIDE .6; .31; .03; .02 G/100ML; G/100ML; G/100ML; G/100ML
1000 INJECTION, SOLUTION INTRAVENOUS ONCE
Status: COMPLETED | OUTPATIENT
Start: 2024-04-27 | End: 2024-04-27

## 2024-04-27 RX ORDER — ONDANSETRON 2 MG/ML
4 INJECTION INTRAMUSCULAR; INTRAVENOUS ONCE
Status: COMPLETED | OUTPATIENT
Start: 2024-04-27 | End: 2024-04-27

## 2024-04-27 RX ORDER — SODIUM CHLORIDE, SODIUM LACTATE, POTASSIUM CHLORIDE, AND CALCIUM CHLORIDE .6; .31; .03; .02 G/100ML; G/100ML; G/100ML; G/100ML
1000 INJECTION, SOLUTION INTRAVENOUS ONCE
Status: COMPLETED | OUTPATIENT
Start: 2024-04-27 | End: 2024-04-28

## 2024-04-27 RX ADMIN — SODIUM CHLORIDE, POTASSIUM CHLORIDE, SODIUM LACTATE AND CALCIUM CHLORIDE 1000 ML: 600; 310; 30; 20 INJECTION, SOLUTION INTRAVENOUS at 21:32

## 2024-04-27 RX ADMIN — IOPAMIDOL 75 ML: 755 INJECTION, SOLUTION INTRAVENOUS at 22:07

## 2024-04-27 RX ADMIN — ONDANSETRON 4 MG: 2 INJECTION INTRAMUSCULAR; INTRAVENOUS at 22:28

## 2024-04-27 RX ADMIN — SODIUM CHLORIDE, POTASSIUM CHLORIDE, SODIUM LACTATE AND CALCIUM CHLORIDE 1000 ML: 600; 310; 30; 20 INJECTION, SOLUTION INTRAVENOUS at 23:12

## 2024-04-27 RX ADMIN — ONDANSETRON 4 MG: 2 INJECTION INTRAMUSCULAR; INTRAVENOUS at 23:17

## 2024-04-27 ASSESSMENT — PAIN - FUNCTIONAL ASSESSMENT: PAIN_FUNCTIONAL_ASSESSMENT: 0-10

## 2024-04-27 ASSESSMENT — PAIN DESCRIPTION - LOCATION: LOCATION: BACK

## 2024-04-27 ASSESSMENT — LIFESTYLE VARIABLES
HOW OFTEN DO YOU HAVE A DRINK CONTAINING ALCOHOL: NEVER
HOW MANY STANDARD DRINKS CONTAINING ALCOHOL DO YOU HAVE ON A TYPICAL DAY: PATIENT DOES NOT DRINK

## 2024-04-27 ASSESSMENT — PAIN DESCRIPTION - PAIN TYPE: TYPE: CHRONIC PAIN

## 2024-04-27 ASSESSMENT — PAIN SCALES - GENERAL: PAINLEVEL_OUTOF10: 4

## 2024-04-28 VITALS
HEART RATE: 97 BPM | OXYGEN SATURATION: 97 % | TEMPERATURE: 99.5 F | WEIGHT: 241 LBS | HEIGHT: 65 IN | BODY MASS INDEX: 40.15 KG/M2 | SYSTOLIC BLOOD PRESSURE: 105 MMHG | DIASTOLIC BLOOD PRESSURE: 66 MMHG | RESPIRATION RATE: 16 BRPM

## 2024-04-28 NOTE — ED TRIAGE NOTES
Patient states she went to a [employer] group it got out at 4 pm and when she came to she was in her car and it was 7 pm. Unable to eat today. Feels dizzy. Headache

## 2024-04-28 NOTE — ED PROVIDER NOTES
Emergency Department Encounter    Patient: Johana Alvarado  MRN: [id number]  : 1990  Date of Evaluation: 2024  ED Provider:  Cory Giron MD    Triage Chief Complaint:   Memory Loss (Patient states she went to a writers group it got out at 4 pm and when she came to she was in her car and it was 7 pm. Unable to eat today. Feels dizzy. Headache )    [place]:  Johana Alvarado is a 33 y.o. female with history seen below presenting with complaints of possible syncopal episode.  Patient states she was at her writing group at 4 PM.  States she has had some nausea without vomiting all day.  States she was feeling mildly nauseous when she left the writing group was walking to her car and mildly lightheaded.  States she got into her car and she is unsure if she syncopized or fell asleep but then woke up around 7 PM.  States because of the nausea she has had decreased p.o. intake throughout the day.  She denies any abdominal pain denies diarrhea constipation, blood or melena stools.  Denies urinary symptoms including dysuria, increased frequency, urgency, hematuria.  She states she has absolutely no chest pain or shortness of breath.  States she does have a mild frontal headache with mild nasal congestion.  Denies blurred vision, focal deficits, motor or sensory changes.  States she does have a history of headaches.  States is not the worst headache of her life and was not acute in onset.  Denies thunderclap nature to headache.  Denies occipital pain, neck pain or stiffness.  Denies recent falls or trauma.  Denies sore throat, difficulty swallowing or handling secretions.  Denies otalgia.  States she has had a mild dry cough over the past several days.  Denies any vertiginous symptoms but states especially when she stands up she feels mildly lightheaded.  States she does have a history of Crohn's but states she has no abdominal pain, diarrhea and only mild nausea and does not believe she is in a Crohn's

## 2024-04-28 NOTE — DISCHARGE INSTRUCTIONS
Follow-up with your primary doctor on Monday for reevaluation.  Call him Monday morning to set up follow-up appointment.  We discussed repeat CT imaging in the ED but discussion with you and shared decision making we will hold off on repeat CT imaging he had no chest pain or shortness of breath but I do want to return to the emergency department if you begin having any chest pain or shortness of breath any repeat episodes of lightheadedness or dizziness any headache, blurred vision, focal deficits, motor or sensory changes also if you begin having fevers, abdominal pain or any new changing or worsening symptoms were always here for evaluation never hesitate to return.

## 2024-04-29 ENCOUNTER — OFFICE VISIT (OUTPATIENT)
Dept: FAMILY MEDICINE CLINIC | Age: 34
End: 2024-04-29
Payer: COMMERCIAL

## 2024-04-29 ENCOUNTER — TELEPHONE (OUTPATIENT)
Dept: FAMILY MEDICINE CLINIC | Age: 34
End: 2024-04-29

## 2024-04-29 VITALS
WEIGHT: 240.13 LBS | HEIGHT: 65 IN | OXYGEN SATURATION: 97 % | DIASTOLIC BLOOD PRESSURE: 81 MMHG | HEART RATE: 92 BPM | TEMPERATURE: 98.7 F | SYSTOLIC BLOOD PRESSURE: 112 MMHG | BODY MASS INDEX: 40.01 KG/M2

## 2024-04-29 DIAGNOSIS — D50.8 IRON DEFICIENCY ANEMIA SECONDARY TO INADEQUATE DIETARY IRON INTAKE: ICD-10-CM

## 2024-04-29 DIAGNOSIS — R53.83 FATIGUE, UNSPECIFIED TYPE: ICD-10-CM

## 2024-04-29 DIAGNOSIS — R59.1 LYMPHADENOPATHY: ICD-10-CM

## 2024-04-29 DIAGNOSIS — Z09 HOSPITAL DISCHARGE FOLLOW-UP: Primary | ICD-10-CM

## 2024-04-29 LAB
EKG ATRIAL RATE: 101 BPM
EKG DIAGNOSIS: NORMAL
EKG P AXIS: 81 DEGREES
EKG P-R INTERVAL: 148 MS
EKG Q-T INTERVAL: 314 MS
EKG QRS DURATION: 94 MS
EKG QTC CALCULATION (BAZETT): 407 MS
EKG R AXIS: 89 DEGREES
EKG T AXIS: 80 DEGREES
EKG VENTRICULAR RATE: 101 BPM
PATH INTERP BLD-IMP: NORMAL

## 2024-04-29 PROCEDURE — 99213 OFFICE O/P EST LOW 20 MIN: CPT | Performed by: NURSE PRACTITIONER

## 2024-04-29 PROCEDURE — 93010 ELECTROCARDIOGRAM REPORT: CPT | Performed by: INTERNAL MEDICINE

## 2024-04-29 PROCEDURE — G8427 DOCREV CUR MEDS BY ELIG CLIN: HCPCS | Performed by: NURSE PRACTITIONER

## 2024-04-29 PROCEDURE — G8417 CALC BMI ABV UP PARAM F/U: HCPCS | Performed by: NURSE PRACTITIONER

## 2024-04-29 PROCEDURE — 1036F TOBACCO NON-USER: CPT | Performed by: NURSE PRACTITIONER

## 2024-04-29 ASSESSMENT — ENCOUNTER SYMPTOMS
GASTROINTESTINAL NEGATIVE: 1
RESPIRATORY NEGATIVE: 1

## 2024-04-29 NOTE — TELEPHONE ENCOUNTER
Pt was seen today in office, and stated that she still needs to get her iron infusions. She was no longer going to [facility]--she was told about the [facility] infusion center. Pt was informed that you would have to be notified as you are her PCP. We would have to figure out the next step for ordering her iron.     Fax number for IV iron through [facility]: [phone] and the phone number is [phone]      Please advise.

## 2024-04-29 NOTE — PROGRESS NOTES
CHIEF COMPLAINT  Chief Complaint   Patient presents with    Other     ER F/U [place]--passing, dizziness, no appetite (4 days)     Fatigue     Possibly due to iron infusions not being done.         HPI   Johana Alvarado is a 33 y.o. female who presents to the office for ER follow-up.  Patient was recently seen in the ER for syncopal episode, dizziness and lightheadedness.  Patient was evaluated and discharged home after testing.  Patient continues to have lightheadedness, decreased appetite and feelings of unwell.  No shortness of breath or chest pain.  No complaints of vomiting or diarrhea.  Patient denies any nausea but reports decreased appetite.  Patient reports not eating in the last 4 days.  Patient does report drinking plenty of fluids.  Patient was seen in the office on  for similar symptoms and referred to outpatient infusion center due to known history of anemia and concerns.  Patient reports that she has not followed up with her insurance in regards to available facilities for iron infusion.    No other complaints, modifying factors or associated symptoms.     Nursing notes reviewed.   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    Chronic GERD 2020    Complication of anesthesia     asthma related-waking up    Depression     past history, now resolved    Diabetes mellitus (HCC)     pre diabetic; currently takes no meds    History of suicidal tendencies     as a teen    Infertility, female     Left carpal tunnel syndrome 2018    Migraine headache     last migraine 3 weeks ago     (normal spontaneous vaginal delivery) 2021    PCOS (polycystic ovarian syndrome)     Postpartum depression     Pre-diabetes     PTSD (post-traumatic stress disorder)     with rare psychotic features in past, symptoms resolved now    Renal cyst, right 2024

## 2024-04-30 DIAGNOSIS — D50.8 IRON DEFICIENCY ANEMIA SECONDARY TO INADEQUATE DIETARY IRON INTAKE: ICD-10-CM

## 2024-04-30 DIAGNOSIS — R53.83 FATIGUE, UNSPECIFIED TYPE: Primary | ICD-10-CM

## 2024-04-30 NOTE — TELEPHONE ENCOUNTER
She stated she was busy and was late to the appt.     I spoke with pt and she is okay with us putting the referral in for [facility] hematology--referral placed and faxed.

## 2024-05-02 ENCOUNTER — HOSPITAL ENCOUNTER (OUTPATIENT)
Dept: PHYSICAL THERAPY | Age: 34
Setting detail: THERAPIES SERIES
Discharge: HOME | End: 2024-05-02
Payer: COMMERCIAL

## 2024-05-02 DIAGNOSIS — M62.81 MUSCLE WEAKNESS (GENERALIZED): Primary | ICD-10-CM

## 2024-05-02 PROCEDURE — 97110 THERAPEUTIC EXERCISES: CPT

## 2024-05-02 PROCEDURE — 97161 PT EVAL LOW COMPLEX 20 MIN: CPT

## 2024-05-02 PROCEDURE — 97112 NEUROMUSCULAR REEDUCATION: CPT

## 2024-05-02 NOTE — PLAN OF CARE
[facility]- Outpatient Rehabilitation and Therapy [address] [place] [address] office: [phone] fax: [phone]     Physical Therapy Initial Evaluation Certification      Dear Gwen Shearer AP*,    We had the pleasure of evaluating the following patient for physical therapy services at [facility].  A summary of our findings can be found in the initial assessment below.  This includes our plan of care.  If you have any questions or concerns regarding these findings, please do not hesitate to contact me at the office phone number listed above.  Thank you for the referral.     Physician Signature:_______________________________Date:__________________  By signing above (or electronic signature), therapist’s plan is approved by physician       Physical Therapy: TREATMENT/PROGRESS NOTE   Patient: Johana Alvarado (33 y.o. female)   Examination Date: 2024   :  1990 MRN: [id number]   Visit #: 1   Insurance Allowable Auth Needed   Auth required after evaluation  Check [place] [x]Yes    []No    Insurance: Payor[facility] [employer] / [facility] [place] / Product Type: *No Product type* /   Insurance ID: [id number] - (Medicaid Managed)  Secondary Insurance (if applicable):    Treatment Diagnosis:     ICD-10-CM    1. Muscle weakness (generalized)  M62.81          Medical Diagnosis:  DDD (degenerative disc disease), lumbar [M51.36]  Acute left-sided low back pain without sciatica [M54.50]   Referring Physician: Gwen Shearer AP*  PCP: Favian Mosquera MD     Plan of care signed (Y/N):     Date of Patient follow up with Physician:      Progress Report/POC: EVAL today  POC update due: (10 visits /OR AUTH LIMITS, whichever is less)  2024                                             Precautions/ Contra-indications:           Latex allergy:  YES  Pacemaker:    NO  Contraindications for Manipulation: None  Date of Surgery: n/a  Other: Hx

## 2024-05-09 ENCOUNTER — OFFICE VISIT (OUTPATIENT)
Dept: BARIATRICS/WEIGHT MGMT | Age: 34
End: 2024-05-09
Payer: COMMERCIAL

## 2024-05-09 VITALS
WEIGHT: 237 LBS | DIASTOLIC BLOOD PRESSURE: 72 MMHG | HEART RATE: 79 BPM | SYSTOLIC BLOOD PRESSURE: 118 MMHG | RESPIRATION RATE: 18 BRPM | HEIGHT: 64 IN | OXYGEN SATURATION: 99 % | BODY MASS INDEX: 40.46 KG/M2

## 2024-05-09 DIAGNOSIS — E78.2 MIXED HYPERLIPIDEMIA: ICD-10-CM

## 2024-05-09 DIAGNOSIS — E28.2 PCOS (POLYCYSTIC OVARIAN SYNDROME): ICD-10-CM

## 2024-05-09 DIAGNOSIS — E66.01 MORBID OBESITY WITH BMI OF 40.0-44.9, ADULT (HCC): Primary | ICD-10-CM

## 2024-05-09 DIAGNOSIS — Z98.84 S/P LAPAROSCOPIC SLEEVE GASTRECTOMY: ICD-10-CM

## 2024-05-09 DIAGNOSIS — R73.03 PREDIABETES: ICD-10-CM

## 2024-05-09 PROCEDURE — 99214 OFFICE O/P EST MOD 30 MIN: CPT | Performed by: SURGERY

## 2024-05-09 PROCEDURE — 1036F TOBACCO NON-USER: CPT | Performed by: SURGERY

## 2024-05-09 PROCEDURE — G8427 DOCREV CUR MEDS BY ELIG CLIN: HCPCS | Performed by: SURGERY

## 2024-05-09 PROCEDURE — G8417 CALC BMI ABV UP PARAM F/U: HCPCS | Performed by: SURGERY

## 2024-05-09 NOTE — PROGRESS NOTES
Dietary Assessment Note      Vitals:   Vitals:    24 1512   Resp: 18   Height: 1.626 m (5' 4\")    Patient gained 3 lbs over ~2.5 mos.    Total Weight Loss: 54 lbs    Labs reviewed:    Latest Reference Range & Units 24 21:30   Calcium 8.3 - 10.6 mg/dL 8.1 (L)   (L): Data is abnormally low    Protein intake: 60-80 grams/day     Fluid intake: 48-64 oz/daywater, gatorade zero,    Multivitamin/mineral intake: yes 4 fusion chewables    Calcium intake:  no    Other: iron    Exercise: yes finished 5K, walking every weekend, Thursday gym class     Nutrition Assessment: 2 yr post-op visit. Was unable to eat for 5 days last week, dizziness, passing out, likely d/t anemia (upcoming testing and iron infusion). ~Estimating 600-1000  B: oatmeal + silk  S: protein shake or granola bar OR first form protein bar   L: tuna + crax OR fruit + cheese   D: chickpea + nonstarchy veg + sometimes rice     Amount able to eat per sittin-1.5 cup     Following  rule: yes - sometimes sips with spicy    Food Intolerances/issues: coffee - rarely drinks    Client Concerns: excess skin (pt states she doesn't like her body and the skin makes it difficult to do some exercises); wt gain with diet and exercise    Goals:   - Aim for 0845-8245 kcal when tracking, 60-90g protein, 40-47g Fat, 90-120g   - Continue diet and exercise plan  - Refer to Dr. Jerry   - Refer to Dr. Burden    Plan: f/u per provider     NILSA RAMSEY, MS, RD, LD  
dietitian and documentation in the EHR. Of note, the above was done during same day of the actual patient encounter.            Johana is here for her 2-year follow-up.  Patient has been very active and even participating in the flying pig marathon however her weight has been at stool.  I think the patient is under eating with only 600 to 1100 gilles a day at sometimes.  Meal plan was provided before to the patient and I was advised again to follow follow it properly.    Recommend the patient join the medical weight loss program to continue weight loss towards their goal. Patient understands that there is no further surgical options at this point that we can offer.     Johana will benefit from seeing our psychologist Dr. Cindy Burden, to work on behavioral aspects / changes to help with weight loss / maintenance.       I did explain thoroughly to the patient that compliance with pre- and post op diet and other recommendations are integral part to improve the chances of successful weight loss and also not following it could end with serious health complications.   Some strategies discussed today include but not limited to : 30/30/30 minutes rule, food diary, avoid fast food and packing/planing ahead, & increasing exercise.    Also stressed to the patient importance of taking the multivitamins as instructed, otherwise risk significant complications.    Obesity as a disease is considered a high risk to patients overall health and should therefore be considered a high risk disease state.   Advised the patient that not getting there weight under control, which hopefully would help with getting some of the comorbidities under control. That could increase risk of complications/worsening of those conditions on the long-term.  During Covid-19 pandemic, CDC and health authorities does classify obese patients as vulnerable and high risk as well.  Which makes weight loss a priority for improvement of their wellbeing and overall

## 2024-05-14 ENCOUNTER — HOSPITAL ENCOUNTER (OUTPATIENT)
Dept: PHYSICAL THERAPY | Age: 34
Setting detail: THERAPIES SERIES
End: 2024-05-14
Payer: COMMERCIAL

## 2024-05-16 ENCOUNTER — HOSPITAL ENCOUNTER (OUTPATIENT)
Dept: PHYSICAL THERAPY | Age: 34
Setting detail: THERAPIES SERIES
Discharge: HOME | End: 2024-05-16
Payer: COMMERCIAL

## 2024-05-16 PROCEDURE — 97112 NEUROMUSCULAR REEDUCATION: CPT

## 2024-05-16 PROCEDURE — 97110 THERAPEUTIC EXERCISES: CPT

## 2024-05-16 NOTE — FLOWSHEET NOTE
[facility]- Outpatient Rehabilitation and Therapy [address], [place] [address] office: [phone] fax: [phone]    Physical Therapy: TREATMENT/PROGRESS NOTE   Patient: Johana Alvarado (33 y.o. female)   Examination Date: 2024   :  1990 MRN: [id number]   Visit #: 2   Insurance Allowable Auth Needed   24 visits (96 units)  24-24   [x]Yes    []No    Insurance: Payor: [employer] / Plan: [employer] [place] MEDICAID / Product Type: *No Product type* /   Insurance ID: [id number] - (Medicaid Managed)  Secondary Insurance (if applicable):    Treatment Diagnosis:     ICD-10-CM    1. Muscle weakness (generalized)  M62.81          Medical Diagnosis:  DDD (degenerative disc disease), lumbar [M51.36]  Acute left-sided low back pain without sciatica [M54.50]   Referring Physician: Gwen Shearer AP*  PCP: Favian Mosquera MD     Plan of care signed (Y/N):     Date of Patient follow up with Physician:      Progress Report/POC: NO  POC update due: (10 visits /OR AUTH LIMITS, whichever is less)  2024                                             Precautions/ Contra-indications:           Latex allergy:  YES  Pacemaker:    NO  Contraindications for Manipulation: None  Date of Surgery: n/a  Other: Hx of Seizures     Red Flags:  Syncope    C-SSRS Triggered by Intake questionnaire:   Yes, C-SSRS screen completed and patient determined to be MODERATE RISK    Preferred Language for Healthcare:   [x] English       [] other:    SUBJECTIVE EXAMINATION     Patient stated complaint: Pt states she was able to complete 2 5k runs over the last week. Pt states she still has quite of bit of pain running into the L LE. Pt states she got some mild relief for about 24 hours following last treatment.        Test used Initial score  2024   Pain Summary VAS 4/10 current  10/10 at worst 4/10   Functional questionnaire Modified Oswestry 25/50  50%    Other:

## 2024-05-17 DIAGNOSIS — J45.40 MODERATE PERSISTENT ASTHMA WITHOUT COMPLICATION: ICD-10-CM

## 2024-05-17 RX ORDER — BUDESONIDE AND FORMOTEROL FUMARATE DIHYDRATE 160; 4.5 UG/1; UG/1
1 AEROSOL RESPIRATORY (INHALATION) 2 TIMES DAILY
Qty: 1 EACH | Refills: 5 | Status: SHIPPED | OUTPATIENT
Start: 2024-05-17

## 2024-05-17 NOTE — TELEPHONE ENCOUNTER
Future appt scheduled 6/25  Last appt 4/29  PATIENT IS OUT OF MEDICATION    
[employer]

## 2024-05-21 ENCOUNTER — HOSPITAL ENCOUNTER (OUTPATIENT)
Dept: PHYSICAL THERAPY | Age: 34
Setting detail: THERAPIES SERIES
End: 2024-05-21
Payer: COMMERCIAL

## 2024-05-23 ENCOUNTER — HOSPITAL ENCOUNTER (OUTPATIENT)
Dept: PHYSICAL THERAPY | Age: 34
Setting detail: THERAPIES SERIES
Discharge: HOME | End: 2024-05-23
Payer: COMMERCIAL

## 2024-05-23 PROCEDURE — 97110 THERAPEUTIC EXERCISES: CPT

## 2024-05-23 PROCEDURE — 97112 NEUROMUSCULAR REEDUCATION: CPT

## 2024-05-23 NOTE — FLOWSHEET NOTE
[facility]- Outpatient Rehabilitation and Therapy [address], [place] [address] office: [phone] fax: [phone]    Physical Therapy: TREATMENT/PROGRESS NOTE   Patient: Johana Alvarado (33 y.o. female)   Examination Date: 2024   :  1990 MRN: [id number]   Visit #: 3   Insurance Allowable Auth Needed   24 visits (96 units)  24-24   [x]Yes    []No    Insurance: Payor: [employer] / Plan: [employer] [place] MEDICAID / Product Type: *No Product type* /   Insurance ID: [id number] - (Medicaid Managed)  Secondary Insurance (if applicable):    Treatment Diagnosis:     ICD-10-CM    1. Muscle weakness (generalized)  M62.81          Medical Diagnosis:  DDD (degenerative disc disease), lumbar [M51.36]  Acute left-sided low back pain without sciatica [M54.50]   Referring Physician: Gwen Shearer AP*  PCP: Favian Mosquera MD     Plan of care signed (Y/N):     Date of Patient follow up with Physician:      Progress Report/POC: NO  POC update due: (10 visits /OR AUTH LIMITS, whichever is less)  2024                                             Precautions/ Contra-indications:           Latex allergy:  YES  Pacemaker:    NO  Contraindications for Manipulation: None  Date of Surgery: n/a  Other: Hx of Seizures     Red Flags:  Syncope    C-SSRS Triggered by Intake questionnaire:   Yes, C-SSRS screen completed and patient determined to be MODERATE RISK    Preferred Language for Healthcare:   [x] English       [] other:    SUBJECTIVE EXAMINATION     Patient stated complaint: Pt states she is trying to walk more with some increased pain complaints and tightness on the L side. Pt states she has been trying to run on  but still has a lot of soreness in lower back.        Test used Initial score  2024   Pain Summary VAS 4/10 current  10/10 at worst 4/10   Functional questionnaire Modified Oswestry 25/50  50%    Other:                OBJECTIVE

## 2024-05-28 ENCOUNTER — HOSPITAL ENCOUNTER (OUTPATIENT)
Age: 34
Discharge: HOME | End: 2024-05-28
Payer: COMMERCIAL

## 2024-05-28 LAB
BILIRUB UR QL STRIP.AUTO: NEGATIVE
CLARITY UR: CLEAR
COLOR UR: YELLOW
GLUCOSE UR STRIP.AUTO-MCNC: NEGATIVE MG/DL
HGB UR QL STRIP.AUTO: NEGATIVE
KETONES UR STRIP.AUTO-MCNC: NEGATIVE MG/DL
LEUKOCYTE ESTERASE UR QL STRIP.AUTO: NEGATIVE
NITRITE UR QL STRIP.AUTO: NEGATIVE
PH UR STRIP.AUTO: 6.5 [PH] (ref 5–8)
PROT UR STRIP.AUTO-MCNC: NEGATIVE MG/DL
SP GR UR STRIP.AUTO: 1.01 (ref 1–1.03)
UA DIPSTICK W REFLEX MICRO PNL UR: NORMAL
URN SPEC COLLECT METH UR: NORMAL
UROBILINOGEN UR STRIP-ACNC: 0.2 E.U./DL

## 2024-05-28 PROCEDURE — 87186 SC STD MICRODIL/AGAR DIL: CPT

## 2024-05-28 PROCEDURE — 87077 CULTURE AEROBIC IDENTIFY: CPT

## 2024-05-28 PROCEDURE — 87086 URINE CULTURE/COLONY COUNT: CPT

## 2024-05-28 PROCEDURE — 81003 URINALYSIS AUTO W/O SCOPE: CPT

## 2024-05-30 ENCOUNTER — HOSPITAL ENCOUNTER (OUTPATIENT)
Dept: PHYSICAL THERAPY | Age: 34
Setting detail: THERAPIES SERIES
End: 2024-05-30
Payer: COMMERCIAL

## 2024-05-30 LAB
BACTERIA UR CULT: ABNORMAL
BACTERIA UR CULT: ABNORMAL
ORGANISM: ABNORMAL

## 2024-06-11 ENCOUNTER — HOSPITAL ENCOUNTER (EMERGENCY)
Age: 34
Discharge: HOME | End: 2024-06-11
Payer: COMMERCIAL

## 2024-06-11 ENCOUNTER — APPOINTMENT (OUTPATIENT)
Dept: CT IMAGING | Age: 34
End: 2024-06-11
Payer: COMMERCIAL

## 2024-06-11 ENCOUNTER — TELEPHONE (OUTPATIENT)
Dept: FAMILY MEDICINE CLINIC | Age: 34
End: 2024-06-11

## 2024-06-11 VITALS
TEMPERATURE: 98.5 F | SYSTOLIC BLOOD PRESSURE: 111 MMHG | DIASTOLIC BLOOD PRESSURE: 63 MMHG | RESPIRATION RATE: 16 BRPM | HEIGHT: 64 IN | BODY MASS INDEX: 41.48 KG/M2 | OXYGEN SATURATION: 98 % | WEIGHT: 243 LBS | HEART RATE: 93 BPM

## 2024-06-11 DIAGNOSIS — R51.9 HEADACHE, UNSPECIFIED HEADACHE TYPE: Primary | ICD-10-CM

## 2024-06-11 LAB
ALBUMIN SERPL-MCNC: 4.1 G/DL (ref 3.4–5)
ALBUMIN/GLOB SERPL: 1.4 {RATIO} (ref 1.1–2.2)
ALP SERPL-CCNC: 72 U/L (ref 40–129)
ALT SERPL-CCNC: <5 U/L (ref 10–40)
AMORPH SED URNS QL MICRO: ABNORMAL /HPF
ANION GAP SERPL CALCULATED.3IONS-SCNC: 11 MMOL/L (ref 3–16)
AST SERPL-CCNC: 9 U/L (ref 15–37)
BACTERIA URNS QL MICRO: ABNORMAL /HPF
BASOPHILS # BLD: 0 K/UL (ref 0–0.2)
BASOPHILS NFR BLD: 0.6 %
BILIRUB SERPL-MCNC: 0.3 MG/DL (ref 0–1)
BILIRUB UR QL STRIP.AUTO: NEGATIVE
BUN SERPL-MCNC: 7 MG/DL (ref 7–20)
CALCIUM SERPL-MCNC: 8.5 MG/DL (ref 8.3–10.6)
CHLORIDE SERPL-SCNC: 104 MMOL/L (ref 99–110)
CLARITY UR: CLEAR
CO2 SERPL-SCNC: 23 MMOL/L (ref 21–32)
COLOR UR: YELLOW
CREAT SERPL-MCNC: <0.5 MG/DL (ref 0.6–1.1)
DEPRECATED RDW RBC AUTO: 17.7 % (ref 12.4–15.4)
EOSINOPHIL # BLD: 0.1 K/UL (ref 0–0.6)
EOSINOPHIL NFR BLD: 1.3 %
EPI CELLS #/AREA URNS HPF: ABNORMAL /HPF (ref 0–5)
FLUAV RNA UPPER RESP QL NAA+PROBE: NEGATIVE
FLUBV AG NPH QL: NEGATIVE
GFR SERPLBLD CREATININE-BSD FMLA CKD-EPI: >90 ML/MIN/{1.73_M2}
GLUCOSE SERPL-MCNC: 106 MG/DL (ref 70–99)
GLUCOSE UR STRIP.AUTO-MCNC: NEGATIVE MG/DL
HCT VFR BLD AUTO: 29 % (ref 36–48)
HGB BLD-MCNC: 9.2 G/DL (ref 12–16)
HGB UR QL STRIP.AUTO: ABNORMAL
KETONES UR STRIP.AUTO-MCNC: NEGATIVE MG/DL
LEUKOCYTE ESTERASE UR QL STRIP.AUTO: NEGATIVE
LYMPHOCYTES # BLD: 1.2 K/UL (ref 1–5.1)
LYMPHOCYTES NFR BLD: 17 %
MCH RBC QN AUTO: 21 PG (ref 26–34)
MCHC RBC AUTO-ENTMCNC: 31.6 G/DL (ref 31–36)
MCV RBC AUTO: 66.4 FL (ref 80–100)
MONOCYTES # BLD: 0.5 K/UL (ref 0–1.3)
MONOCYTES NFR BLD: 6.8 %
NEUTROPHILS # BLD: 5.3 K/UL (ref 1.7–7.7)
NEUTROPHILS NFR BLD: 74.3 %
NITRITE UR QL STRIP.AUTO: NEGATIVE
PATH INTERP BLD-IMP: NO
PH UR STRIP.AUTO: 7 [PH] (ref 5–8)
PLATELET # BLD AUTO: 200 K/UL (ref 135–450)
PMV BLD AUTO: 10.4 FL (ref 5–10.5)
POTASSIUM SERPL-SCNC: 4 MMOL/L (ref 3.5–5.1)
PROT SERPL-MCNC: 7 G/DL (ref 6.4–8.2)
PROT UR STRIP.AUTO-MCNC: NEGATIVE MG/DL
RBC # BLD AUTO: 4.38 M/UL (ref 4–5.2)
RBC #/AREA URNS HPF: ABNORMAL /HPF (ref 0–4)
SARS-COV-2 RDRP RESP QL NAA+PROBE: NOT DETECTED
SODIUM SERPL-SCNC: 138 MMOL/L (ref 136–145)
SP GR UR STRIP.AUTO: 1.01 (ref 1–1.03)
UA COMPLETE W REFLEX CULTURE PNL UR: ABNORMAL
UA DIPSTICK W REFLEX MICRO PNL UR: YES
URN SPEC COLLECT METH UR: ABNORMAL
UROBILINOGEN UR STRIP-ACNC: 0.2 E.U./DL
WBC # BLD AUTO: 7.1 K/UL (ref 4–11)
WBC #/AREA URNS HPF: ABNORMAL /HPF (ref 0–5)

## 2024-06-11 PROCEDURE — 6360000002 HC RX W HCPCS: Performed by: PHYSICIAN ASSISTANT

## 2024-06-11 PROCEDURE — 2580000003 HC RX 258: Performed by: PHYSICIAN ASSISTANT

## 2024-06-11 PROCEDURE — 87635 SARS-COV-2 COVID-19 AMP PRB: CPT

## 2024-06-11 PROCEDURE — 96375 TX/PRO/DX INJ NEW DRUG ADDON: CPT

## 2024-06-11 PROCEDURE — 84703 CHORIONIC GONADOTROPIN ASSAY: CPT

## 2024-06-11 PROCEDURE — 87804 INFLUENZA ASSAY W/OPTIC: CPT

## 2024-06-11 PROCEDURE — 96374 THER/PROPH/DIAG INJ IV PUSH: CPT

## 2024-06-11 PROCEDURE — 99284 EMERGENCY DEPT VISIT MOD MDM: CPT

## 2024-06-11 PROCEDURE — 81001 URINALYSIS AUTO W/SCOPE: CPT

## 2024-06-11 PROCEDURE — 85025 COMPLETE CBC W/AUTO DIFF WBC: CPT

## 2024-06-11 PROCEDURE — 80053 COMPREHEN METABOLIC PANEL: CPT

## 2024-06-11 PROCEDURE — 96361 HYDRATE IV INFUSION ADD-ON: CPT

## 2024-06-11 PROCEDURE — 36415 COLL VENOUS BLD VENIPUNCTURE: CPT

## 2024-06-11 PROCEDURE — 6370000000 HC RX 637 (ALT 250 FOR IP): Performed by: PHYSICIAN ASSISTANT

## 2024-06-11 RX ORDER — BUTALBITAL, ACETAMINOPHEN AND CAFFEINE 50; 325; 40 MG/1; MG/1; MG/1
1 TABLET ORAL EVERY 6 HOURS PRN
Qty: 10 TABLET | Refills: 0 | Status: SHIPPED | OUTPATIENT
Start: 2024-06-11

## 2024-06-11 RX ORDER — 0.9 % SODIUM CHLORIDE 0.9 %
1000 INTRAVENOUS SOLUTION INTRAVENOUS ONCE
Status: COMPLETED | OUTPATIENT
Start: 2024-06-11 | End: 2024-06-11

## 2024-06-11 RX ORDER — DIPHENHYDRAMINE HYDROCHLORIDE 50 MG/ML
25 INJECTION INTRAMUSCULAR; INTRAVENOUS ONCE
Status: COMPLETED | OUTPATIENT
Start: 2024-06-11 | End: 2024-06-11

## 2024-06-11 RX ORDER — PROCHLORPERAZINE EDISYLATE 5 MG/ML
10 INJECTION INTRAMUSCULAR; INTRAVENOUS ONCE
Status: COMPLETED | OUTPATIENT
Start: 2024-06-11 | End: 2024-06-11

## 2024-06-11 RX ORDER — DEXAMETHASONE SODIUM PHOSPHATE 10 MG/ML
10 INJECTION, SOLUTION INTRAMUSCULAR; INTRAVENOUS ONCE
Status: COMPLETED | OUTPATIENT
Start: 2024-06-11 | End: 2024-06-11

## 2024-06-11 RX ORDER — ONDANSETRON 4 MG/1
4 TABLET, ORALLY DISINTEGRATING ORAL EVERY 8 HOURS PRN
Qty: 10 TABLET | Refills: 0 | Status: SHIPPED | OUTPATIENT
Start: 2024-06-11

## 2024-06-11 RX ORDER — ACETAMINOPHEN 500 MG
1000 TABLET ORAL ONCE
Status: DISCONTINUED | OUTPATIENT
Start: 2024-06-11 | End: 2024-06-11

## 2024-06-11 RX ADMIN — PROCHLORPERAZINE EDISYLATE 10 MG: 5 INJECTION INTRAMUSCULAR; INTRAVENOUS at 19:06

## 2024-06-11 RX ADMIN — DEXAMETHASONE SODIUM PHOSPHATE 10 MG: 10 INJECTION, SOLUTION INTRAMUSCULAR; INTRAVENOUS at 19:06

## 2024-06-11 RX ADMIN — SODIUM CHLORIDE 1000 ML: 9 INJECTION, SOLUTION INTRAVENOUS at 19:07

## 2024-06-11 RX ADMIN — DIPHENHYDRAMINE HYDROCHLORIDE 25 MG: 50 INJECTION INTRAMUSCULAR; INTRAVENOUS at 19:06

## 2024-06-11 ASSESSMENT — PAIN DESCRIPTION - PAIN TYPE
TYPE: ACUTE PAIN
TYPE: ACUTE PAIN

## 2024-06-11 ASSESSMENT — PAIN DESCRIPTION - DESCRIPTORS
DESCRIPTORS: ACHING;DULL
DESCRIPTORS: THROBBING;SHOOTING;SHARP
DESCRIPTORS: ACHING

## 2024-06-11 ASSESSMENT — PAIN - FUNCTIONAL ASSESSMENT
PAIN_FUNCTIONAL_ASSESSMENT: 0-10
PAIN_FUNCTIONAL_ASSESSMENT: ACTIVITIES ARE NOT PREVENTED
PAIN_FUNCTIONAL_ASSESSMENT: 0-10
PAIN_FUNCTIONAL_ASSESSMENT: 0-10
PAIN_FUNCTIONAL_ASSESSMENT: PREVENTS OR INTERFERES SOME ACTIVE ACTIVITIES AND ADLS

## 2024-06-11 ASSESSMENT — PAIN DESCRIPTION - FREQUENCY
FREQUENCY: CONTINUOUS
FREQUENCY: CONTINUOUS

## 2024-06-11 ASSESSMENT — PAIN SCALES - GENERAL
PAINLEVEL_OUTOF10: 9
PAINLEVEL_OUTOF10: 3
PAINLEVEL_OUTOF10: 3

## 2024-06-11 ASSESSMENT — PAIN DESCRIPTION - ONSET
ONSET: ON-GOING
ONSET: ON-GOING

## 2024-06-11 ASSESSMENT — PAIN DESCRIPTION - LOCATION
LOCATION: HEAD

## 2024-06-11 NOTE — TELEPHONE ENCOUNTER
Pt called back after training and stated that she is unable to see at all from her Periferal  vision and she is seeing spots--it has gotten worse throughout the day and she was very concerned about the pain. Her PCP was booked and Ruthy Falcon stated that she did not feel comfortable doing an exam over VV--resulting in recommending pt going to [facility] in [place]. She stated that she had to pick her son up and I advised her to call someone else to do that as I didn't feel comfortable with her driving herself, let alone her child. Pt verbalized understanding.

## 2024-06-11 NOTE — ED PROVIDER NOTES
Conditions affecting care:    has a past medical history of ADD (attention deficit disorder), Anemia, Asthma, Autoimmune disorder (HCC), Chronic GERD (2020), Complication of anesthesia, Depression, Diabetes mellitus (HCC), History of suicidal tendencies, Infertility, female, Left carpal tunnel syndrome (2018), Migraine headache,  (normal spontaneous vaginal delivery) (2021), PCOS (polycystic ovarian syndrome), Postpartum depression, Pre-diabetes, PTSD (post-traumatic stress disorder), Renal cyst, right (2024), Scoliosis, Seizures ([facility]), Suicidal ideations, Syncope, and Tachycardia.    CONSULTS: (Who and What was discussed)  None        CC/HPI Summary, DDx, ED Course, and Reassessment:       Patient presented to the ER for evaluation of migraine headache that started yesterday gradual onset and worsening and now 9 out of 10 pain is bifrontal/bitemporal headache with light sensitivity and visual disturbance states bright white in her peripheral vision bilateral. No loss of vision. No numbness or weakness.  Intact sensation equal strength.  NIHSS 0.  States her neck is stiff attributes this to not sleeping well. No nuchal rigidity on exam. Freely touches chin to chest without difficulty. Hx of migraines states she has had headaches like this before but this is worse than her usual migraines. CT brain ordered for further evaluation along with basic labs and urinalysis. Given Decadron, compazine, benadryl and IV fluids. She is stable. Of note she has low grade temp of 100.5 at triage patient states she did not know she had a fever and denies any ill symptoms.      Differential diagnosis includes but is not limited to migraine headache, tension headache, meningitis, viral syndrome, urinary tract infection, electrolyte abnormality, dehydration      Negative HCG. Urinalysis negative for urinary tract infection.       7:44 PM EDT  CT tech came to get patient for her head CT but she declined CT

## 2024-06-11 NOTE — TELEPHONE ENCOUNTER
Patient c/o headache.  She said she has history of headaches but she is no longer able to take nsaids.  She was wanting to see if there was something you could prescribe for her.  She has been using tylenol but it is not helping.  Symptoms started last night but have gotten worse today

## 2024-06-12 NOTE — DISCHARGE INSTRUCTIONS
Fioricet prescribed as needed for headache or migraine.  Zofran as needed for nausea.  Call your doctor tomorrow to arrange close follow-up appointment.  Return to the ER for any worsening symptoms specifically for worsening headache, vomiting, fevers, if you develop numbness or weakness, vision change, any trouble speaking or walking.

## 2024-06-22 SDOH — ECONOMIC STABILITY: TRANSPORTATION INSECURITY
IN THE PAST 12 MONTHS, HAS LACK OF TRANSPORTATION KEPT YOU FROM MEETINGS, WORK, OR FROM GETTING THINGS NEEDED FOR DAILY LIVING?: NO

## 2024-06-22 SDOH — ECONOMIC STABILITY: FOOD INSECURITY: WITHIN THE PAST 12 MONTHS, YOU WORRIED THAT YOUR FOOD WOULD RUN OUT BEFORE YOU GOT MONEY TO BUY MORE.: SOMETIMES TRUE

## 2024-06-22 SDOH — ECONOMIC STABILITY: FOOD INSECURITY: WITHIN THE PAST 12 MONTHS, THE FOOD YOU BOUGHT JUST DIDN'T LAST AND YOU DIDN'T HAVE MONEY TO GET MORE.: SOMETIMES TRUE

## 2024-06-22 SDOH — ECONOMIC STABILITY: HOUSING INSECURITY
IN THE LAST 12 MONTHS, WAS THERE A TIME WHEN YOU DID NOT HAVE A STEADY PLACE TO SLEEP OR SLEPT IN A SHELTER (INCLUDING NOW)?: NO

## 2024-06-22 SDOH — ECONOMIC STABILITY: INCOME INSECURITY: HOW HARD IS IT FOR YOU TO PAY FOR THE VERY BASICS LIKE FOOD, HOUSING, MEDICAL CARE, AND HEATING?: SOMEWHAT HARD

## 2024-06-25 ENCOUNTER — OFFICE VISIT (OUTPATIENT)
Dept: FAMILY MEDICINE CLINIC | Age: 34
End: 2024-06-25
Payer: COMMERCIAL

## 2024-06-25 VITALS
TEMPERATURE: 98.4 F | BODY MASS INDEX: 41.88 KG/M2 | HEART RATE: 88 BPM | OXYGEN SATURATION: 98 % | DIASTOLIC BLOOD PRESSURE: 68 MMHG | SYSTOLIC BLOOD PRESSURE: 99 MMHG | WEIGHT: 244 LBS

## 2024-06-25 DIAGNOSIS — D50.8 IRON DEFICIENCY ANEMIA SECONDARY TO INADEQUATE DIETARY IRON INTAKE: Primary | ICD-10-CM

## 2024-06-25 DIAGNOSIS — E55.9 VITAMIN D DEFICIENCY: ICD-10-CM

## 2024-06-25 DIAGNOSIS — G43.109 MIGRAINE WITH AURA AND WITHOUT STATUS MIGRAINOSUS, NOT INTRACTABLE: ICD-10-CM

## 2024-06-25 PROCEDURE — 1036F TOBACCO NON-USER: CPT | Performed by: FAMILY MEDICINE

## 2024-06-25 PROCEDURE — G8417 CALC BMI ABV UP PARAM F/U: HCPCS | Performed by: FAMILY MEDICINE

## 2024-06-25 PROCEDURE — G8427 DOCREV CUR MEDS BY ELIG CLIN: HCPCS | Performed by: FAMILY MEDICINE

## 2024-06-25 PROCEDURE — 99214 OFFICE O/P EST MOD 30 MIN: CPT | Performed by: FAMILY MEDICINE

## 2024-06-25 RX ORDER — BUTALBITAL, ACETAMINOPHEN AND CAFFEINE 50; 325; 40 MG/1; MG/1; MG/1
1 TABLET ORAL EVERY 6 HOURS PRN
Qty: 10 TABLET | Refills: 0 | Status: SHIPPED | OUTPATIENT
Start: 2024-06-25

## 2024-06-25 NOTE — PATIENT INSTRUCTIONS
Please read the healthy family handout that you were given and share it with your family.       Please compare this printed medication list with your medications at home to be sure they are the same.  If you have any medications that are different please contact us immediately at [phone].     Also review your allergies that we have listed, these may also include medications that you have not been able to tolerate, make sure everything listed is correct. If you have any allergies that are different please contact us immediately at [phone].     You may receive a survey in the mail or by email asking about your experience during your visit today. Please complete and return to us so we know how we are serving you.

## 2024-06-25 NOTE — PROGRESS NOTES
She presents today for follow-up of her iron deficiency and fatigue.  She is struggling to lose weight and she saw her bariatric surgeon and we told her she was eating too little.  She joined a 5K run walk program and she gets very short of breath but she has significant anemia.  She saw GI yesterday and they are going to do endoscopy to make sure there is no other reason why she has iron deficiency and she is set up by  hematology to have her first iron infusion tomorrow.  Her last hemoglobin on June 11 was 9.2.  A lot of her symptoms she is still having are exacerbated by her anemia.  She was seen in the ER for severe migraine and they gave her Fioricet to use as needed which helps.  She is only getting 1 migraine a week now it has been more frequent before that.  Tests and documents reviewed today: We reviewed labs done by  and discussed them .in addition to her iron deficiency anemia she had low vitamin D level .today     Objective:   BP 99/68   Pulse 88   Temp 98.4 °F (36.9 °C) (Oral)   Wt 110.7 kg (244 lb)   SpO2 98%   BMI 41.88 kg/m²   BP Readings from Last 3 Encounters:   06/25/24 99/68   06/11/24 111/63   05/09/24 118/72     Physical Exam  Vitals reviewed.   Constitutional:       Appearance: She is well-developed.   HENT:      Head: Normocephalic.   Neck:      Thyroid: No thyromegaly.   Cardiovascular:      Rate and Rhythm: Normal rate and regular rhythm.      Heart sounds: Normal heart sounds. No murmur heard.  Pulmonary:      Effort: No respiratory distress.      Breath sounds: Normal breath sounds. No wheezing or rales.   Abdominal:      Palpations: Abdomen is soft.      Tenderness: There is no abdominal tenderness.   Musculoskeletal:      Cervical back: Neck supple.   Lymphadenopathy:      Cervical: No cervical adenopathy.   Neurological:      Mental Status: She is alert.   Psychiatric:         Behavior: Behavior normal.       Assessment and Plan:   Diagnosis Orders   1. Iron deficiency anemia

## 2024-07-17 ENCOUNTER — TELEMEDICINE (OUTPATIENT)
Dept: BARIATRICS/WEIGHT MGMT | Age: 34
End: 2024-07-17

## 2024-07-17 DIAGNOSIS — E66.01 MORBID OBESITY WITH BMI OF 40.0-44.9, ADULT (HCC): Primary | ICD-10-CM

## 2024-07-17 DIAGNOSIS — Z71.3 DIETARY COUNSELING AND SURVEILLANCE: ICD-10-CM

## 2024-07-17 DIAGNOSIS — Z98.84 S/P LAPAROSCOPIC SLEEVE GASTRECTOMY: ICD-10-CM

## 2024-07-17 NOTE — PROGRESS NOTES
Patient: Johana Alvarado     Encounter Date: 7/17/2024    YOB: 1990               Age: 34 y.o.        Patient identification was verified at the start of the visit.         7/17/2024     9:44 AM   Patient-Reported Vitals   Patient-Reported Weight 245   Patient-Reported Height 5'4\"   Patient-Reported Systolic 120 mmHg   Patient-Reported Diastolic 80 mmHg   Patient-Reported Pulse 93   Patient-Reported Temperature 98.3   Patient-Reported SpO2 96         BP Readings from Last 1 Encounters:   06/25/24 99/68       BMI Readings from Last 1 Encounters:   06/25/24 41.88 kg/m²       Pulse Readings from Last 1 Encounters:   06/25/24 88                                             Wt Readings from Last 3 Encounters:   06/25/24 110.7 kg (244 lb)   06/11/24 110.2 kg (243 lb)   05/09/24 107.5 kg (237 lb)        Chief Complaint   Patient presents with    Weight Management     F/u MWM       HPI:    34 y.o. female presents to establish care via video visit. The patient's medical history is significant for class III s/p sleeve gastrectomy May in 2022 by Dr. Chou. The patient has a long-standing history of obesity which started gradually. The problem is severe.  The patient has been gaining weight.  Risk factors include annual weight gain of >2 lbs (1 kg)/ year and sedentary lifestyle. Aggravating factors include poor diet and lack of physical activity. The patient has tried various diet/exercise plans which have been ineffective in the long-run. she is motivated to start losing weight to help improve her overall health.     Initial presurgical weight: 290 pounds  Lowest weight: s/p sleeve: 198 pounds    Gained weight during pregnancy in 2023     When did you become overweight?  [] Childhood   [] Teens   [x] Adulthood   [] Pregnancy   [] Menopause    Highest adult weight: 290 pounds     Triggers for weight gain?   [] Stress   [] Illness   [] Medications   [] Travel  []Injury     [] Nightshift work   [] Insomnia  [] No

## 2024-07-25 ENCOUNTER — TELEPHONE (OUTPATIENT)
Dept: BARIATRICS/WEIGHT MGMT | Age: 34
End: 2024-07-25

## 2024-07-25 NOTE — TELEPHONE ENCOUNTER
Per Dr. Patel; it was mentioned patient can use 1-2 Nectar/Unjury supplement as a meal replacement. There is no diet to \"reset metabolism\"

## 2024-07-25 NOTE — TELEPHONE ENCOUNTER
RD will need clarification from Provider as to what meal plan she would like patient to receive regarding patients message below:            Message from pt below via [employer] Message:    Good afternoon,   I met with Dr. Patel.  She says that I am not a candidate for medication-assisted weightloss.  She advised to have dietician reach out to me with a diet plan to reset my metabolism; she compared it to the pre-surgery diet.  She said it utilizes a lot of Optifast/Nectar type products.  She further advised that if I continue to struggle with stall or gain after 3 months of this, to schedule for a consult to discuss a revision surgery to convert to bypass.  Please have dietician email the diet plan to me at [email or web address] or here on [facility].  Thank you,  Johana Alvarado   [phone]

## 2024-08-15 RX ORDER — NYSTATIN 100000 [USP'U]/G
POWDER TOPICAL
Qty: 60 G | Refills: 1 | Status: SHIPPED | OUTPATIENT
Start: 2024-08-15

## 2024-09-06 ENCOUNTER — TELEPHONE (OUTPATIENT)
Dept: OBGYN CLINIC | Age: 34
End: 2024-09-06

## 2024-09-06 DIAGNOSIS — Z34.90 PREGNANCY WITH UNCERTAIN DATES, ANTEPARTUM: Primary | ICD-10-CM

## 2024-09-06 NOTE — TELEPHONE ENCOUNTER
Patient would like to get a outpatient ultrasound done prior to visit next week. Patient just found out she is  pregnant and needs to know how far along she is     Please place order for ultrasound

## 2024-09-10 ENCOUNTER — HOSPITAL ENCOUNTER (OUTPATIENT)
Dept: ULTRASOUND IMAGING | Age: 34
Discharge: HOME | End: 2024-09-10
Attending: OBSTETRICS & GYNECOLOGY
Payer: COMMERCIAL

## 2024-09-10 DIAGNOSIS — Z34.90 PREGNANCY WITH UNCERTAIN DATES, ANTEPARTUM: ICD-10-CM

## 2024-09-10 PROCEDURE — 76801 OB US < 14 WKS SINGLE FETUS: CPT

## 2024-09-11 ENCOUNTER — OFFICE VISIT (OUTPATIENT)
Dept: OBGYN CLINIC | Age: 34
End: 2024-09-11
Payer: COMMERCIAL

## 2024-09-11 VITALS
TEMPERATURE: 97.9 F | HEART RATE: 81 BPM | SYSTOLIC BLOOD PRESSURE: 116 MMHG | OXYGEN SATURATION: 100 % | DIASTOLIC BLOOD PRESSURE: 74 MMHG | HEIGHT: 64 IN | BODY MASS INDEX: 43.09 KG/M2 | WEIGHT: 252.41 LBS

## 2024-09-11 DIAGNOSIS — Z32.01 POSITIVE PREGNANCY TEST: Primary | ICD-10-CM

## 2024-09-11 DIAGNOSIS — Z30.09 ENCOUNTER FOR COUNSELING REGARDING CONTRACEPTION: ICD-10-CM

## 2024-09-11 PROCEDURE — 1036F TOBACCO NON-USER: CPT | Performed by: OBSTETRICS & GYNECOLOGY

## 2024-09-11 PROCEDURE — 99214 OFFICE O/P EST MOD 30 MIN: CPT | Performed by: OBSTETRICS & GYNECOLOGY

## 2024-09-11 PROCEDURE — G8417 CALC BMI ABV UP PARAM F/U: HCPCS | Performed by: OBSTETRICS & GYNECOLOGY

## 2024-09-11 PROCEDURE — G8427 DOCREV CUR MEDS BY ELIG CLIN: HCPCS | Performed by: OBSTETRICS & GYNECOLOGY

## 2024-09-11 RX ORDER — ONDANSETRON 4 MG/1
4 TABLET, ORALLY DISINTEGRATING ORAL EVERY 8 HOURS PRN
Qty: 20 TABLET | Refills: 1 | Status: SHIPPED | OUTPATIENT
Start: 2024-09-11

## 2024-09-24 ENCOUNTER — OFFICE VISIT (OUTPATIENT)
Dept: FAMILY MEDICINE CLINIC | Age: 34
End: 2024-09-24
Payer: COMMERCIAL

## 2024-09-24 VITALS
SYSTOLIC BLOOD PRESSURE: 83 MMHG | BODY MASS INDEX: 43.43 KG/M2 | OXYGEN SATURATION: 98 % | WEIGHT: 253 LBS | HEART RATE: 83 BPM | DIASTOLIC BLOOD PRESSURE: 54 MMHG

## 2024-09-24 DIAGNOSIS — F43.10 PTSD (POST-TRAUMATIC STRESS DISORDER): Primary | ICD-10-CM

## 2024-09-24 DIAGNOSIS — D50.8 IRON DEFICIENCY ANEMIA SECONDARY TO INADEQUATE DIETARY IRON INTAKE: ICD-10-CM

## 2024-09-24 DIAGNOSIS — J30.9 ALLERGIC RHINITIS, UNSPECIFIED SEASONALITY, UNSPECIFIED TRIGGER: ICD-10-CM

## 2024-09-24 DIAGNOSIS — F90.2 ATTENTION DEFICIT HYPERACTIVITY DISORDER (ADHD), COMBINED TYPE: ICD-10-CM

## 2024-09-24 PROCEDURE — G8417 CALC BMI ABV UP PARAM F/U: HCPCS | Performed by: FAMILY MEDICINE

## 2024-09-24 PROCEDURE — 1036F TOBACCO NON-USER: CPT | Performed by: FAMILY MEDICINE

## 2024-09-24 PROCEDURE — G8427 DOCREV CUR MEDS BY ELIG CLIN: HCPCS | Performed by: FAMILY MEDICINE

## 2024-09-24 PROCEDURE — 99214 OFFICE O/P EST MOD 30 MIN: CPT | Performed by: FAMILY MEDICINE

## 2024-09-24 RX ORDER — FLUTICASONE PROPIONATE 50 MCG
2 SPRAY, SUSPENSION (ML) NASAL DAILY
Qty: 1 EACH | Refills: 5 | Status: SHIPPED | OUTPATIENT
Start: 2024-09-24

## 2024-09-24 RX ORDER — ALBUTEROL SULFATE 90 UG/1
2 INHALANT RESPIRATORY (INHALATION) EVERY 6 HOURS PRN
Qty: 18 G | Refills: 3 | Status: SHIPPED | OUTPATIENT
Start: 2024-09-24

## 2024-09-24 RX ORDER — PRAZOSIN HYDROCHLORIDE 1 MG/1
1 CAPSULE ORAL NIGHTLY
Qty: 30 CAPSULE | Refills: 3 | Status: SHIPPED | OUTPATIENT
Start: 2024-09-24

## 2024-09-24 RX ORDER — DEXTROAMPHETAMINE SACCHARATE, AMPHETAMINE ASPARTATE MONOHYDRATE, DEXTROAMPHETAMINE SULFATE AND AMPHETAMINE SULFATE 3.75; 3.75; 3.75; 3.75 MG/1; MG/1; MG/1; MG/1
15 CAPSULE, EXTENDED RELEASE ORAL DAILY
Qty: 30 CAPSULE | Refills: 0 | Status: SHIPPED | OUTPATIENT
Start: 2024-11-23 | End: 2024-12-23

## 2024-09-25 ENCOUNTER — TELEPHONE (OUTPATIENT)
Dept: FAMILY MEDICINE CLINIC | Age: 34
End: 2024-09-25

## 2024-09-25 RX ORDER — FLUTICASONE PROPIONATE 50 MCG
SPRAY, SUSPENSION (ML) NASAL
Qty: 1 EACH | Refills: 5 | Status: SHIPPED | OUTPATIENT
Start: 2024-09-25

## 2024-09-25 RX ORDER — ALBUTEROL SULFATE 90 UG/1
INHALANT RESPIRATORY (INHALATION)
Qty: 18 EACH | Refills: 5 | Status: SHIPPED | OUTPATIENT
Start: 2024-09-25

## 2024-09-26 RX ORDER — BENZOCAINE/MENTHOL 6 MG-10 MG
LOZENGE MUCOUS MEMBRANE 2 TIMES DAILY
Qty: 28.4 G | Refills: 1 | Status: SHIPPED | OUTPATIENT
Start: 2024-09-26

## 2024-10-07 ENCOUNTER — TELEPHONE (OUTPATIENT)
Dept: OBGYN CLINIC | Age: 34
End: 2024-10-07

## 2024-10-07 NOTE — TELEPHONE ENCOUNTER
Pt calling to check status of IUD order. Routing to Sukh for follow up. Please call pt with update.

## 2024-10-08 ENCOUNTER — TELEPHONE (OUTPATIENT)
Dept: FAMILY MEDICINE CLINIC | Age: 34
End: 2024-10-08

## 2024-10-08 DIAGNOSIS — F90.2 ATTENTION DEFICIT HYPERACTIVITY DISORDER (ADHD), COMBINED TYPE: ICD-10-CM

## 2024-10-08 DIAGNOSIS — F90.2 ATTENTION DEFICIT HYPERACTIVITY DISORDER (ADHD), COMBINED TYPE: Primary | ICD-10-CM

## 2024-10-08 RX ORDER — DEXTROAMPHETAMINE SACCHARATE, AMPHETAMINE ASPARTATE MONOHYDRATE, DEXTROAMPHETAMINE SULFATE AND AMPHETAMINE SULFATE 3.75; 3.75; 3.75; 3.75 MG/1; MG/1; MG/1; MG/1
15 CAPSULE, EXTENDED RELEASE ORAL DAILY
Qty: 30 CAPSULE | Refills: 0 | Status: SHIPPED | OUTPATIENT
Start: 2024-10-08 | End: 2024-11-07

## 2024-10-08 RX ORDER — DEXTROAMPHETAMINE SACCHARATE, AMPHETAMINE ASPARTATE MONOHYDRATE, DEXTROAMPHETAMINE SULFATE AND AMPHETAMINE SULFATE 3.75; 3.75; 3.75; 3.75 MG/1; MG/1; MG/1; MG/1
15 CAPSULE, EXTENDED RELEASE ORAL DAILY
Qty: 30 CAPSULE | Refills: 0 | Status: CANCELLED | OUTPATIENT
Start: 2024-10-08 | End: 2024-11-07

## 2024-10-08 NOTE — TELEPHONE ENCOUNTER
Amphetamine-Dextroamphetamine 15 MG    [facility]    Per Patient- Pharmacy has not received a script for October, they only have November. Pharmacy also informed patient prescriber will need to write \"Dispense as written\" on the script so that she can get the name brand and not generic, they do not have generic in stock.    Patient would like a call when script is sent.     Last OV- 9/24/24    Next OV- 2/4/25

## 2024-10-08 NOTE — TELEPHONE ENCOUNTER
Pharmacy needs prescription resent for the adderall, the one that was sent previously was incorrect and had a future fill date of Nov on it

## 2024-10-20 ENCOUNTER — HOSPITAL ENCOUNTER (EMERGENCY)
Age: 34
Discharge: HOME | End: 2024-10-20
Payer: COMMERCIAL

## 2024-10-20 VITALS
OXYGEN SATURATION: 99 % | RESPIRATION RATE: 16 BRPM | WEIGHT: 245.5 LBS | TEMPERATURE: 98.7 F | SYSTOLIC BLOOD PRESSURE: 124 MMHG | DIASTOLIC BLOOD PRESSURE: 78 MMHG | HEART RATE: 78 BPM | HEIGHT: 64 IN | BODY MASS INDEX: 41.91 KG/M2

## 2024-10-20 DIAGNOSIS — Z20.822 EXPOSURE TO COVID-19 VIRUS: Primary | ICD-10-CM

## 2024-10-20 LAB
FLUAV RNA UPPER RESP QL NAA+PROBE: NEGATIVE
FLUBV AG NPH QL: NEGATIVE
SARS-COV-2 RDRP RESP QL NAA+PROBE: NOT DETECTED

## 2024-10-20 PROCEDURE — 87635 SARS-COV-2 COVID-19 AMP PRB: CPT

## 2024-10-20 PROCEDURE — 87804 INFLUENZA ASSAY W/OPTIC: CPT

## 2024-10-20 PROCEDURE — 99283 EMERGENCY DEPT VISIT LOW MDM: CPT

## 2024-10-20 RX ORDER — ACETAMINOPHEN 325 MG/1
650 TABLET ORAL EVERY 6 HOURS PRN
COMMUNITY

## 2024-10-20 ASSESSMENT — PAIN - FUNCTIONAL ASSESSMENT
PAIN_FUNCTIONAL_ASSESSMENT: NONE - DENIES PAIN
PAIN_FUNCTIONAL_ASSESSMENT: NONE - DENIES PAIN

## 2024-10-21 NOTE — ED PROVIDER NOTES
[facility] EMERGENCY DEPARTMENT  EMERGENCY DEPARTMENT ENCOUNTER        Pt Name: Johana Alvarado  MRN: [id number]  Birthdate 1990  Date of evaluation: 10/20/2024  Provider: Rimma Rodriguez PA-C  PCP: Favian Mosquera MD  Note Started: 8:06 PM EDT 10/20/24      TIERRA. I have evaluated this patient.        CHIEF COMPLAINT       Chief Complaint   Patient presents with    Concern For COVID-19     States her boyfriend tested + for COVID & flu B. Pt states she is currently asymptomatic but, wants tested for both       HISTORY OF PRESENT ILLNESS: 1 or more Elements     History From: Patient  Limitations to history : None    Johana Alvarado is a 34 y.o. female who presents to the emergency department requesting testing for COVID-19 and flu due to recent exposure but she does not have any symptoms.      Nursing Notes were all reviewed and agreed with or any disagreements were addressed in the HPI.    REVIEW OF SYSTEMS :      Review of Systems    Positives and Pertinent negatives as per HPI.     SURGICAL HISTORY     Past Surgical History:   Procedure Laterality Date    CHOLECYSTECTOMY  2/25/13    CHOLECYSTECTOMY, LAPAROSCOPIC  02/25/2013    ENDOSCOPY, COLON, DIAGNOSTIC      HIATAL HERNIA REPAIR N/A 05/16/2022    LAPAROSCOPIC HIATAL HERNIA REPAIR AND LYSIS OF ADHESIONS performed by Lynsey Cohu MD at [facility] OR    LAPAROSCOPY Right 12/22/2020    DIAGNOSTIC LAPAROSCOPY,  RIGHT SALPINGOOPHORECTOMY              **LATEX SENSITIVE** CPT CODE - 68728, 02709, 45244 performed by Layne Ruano MD at [facility] OR    SALPINGO-OOPHORECTOMY      right    SLEEVE GASTRECTOMY N/A 05/16/2022    LAPAROSCOPIC SLEEVE GASTRECTOMY performed by Lynsey Chou MD at [facility] OR    UPPER GASTROINTESTINAL ENDOSCOPY N/A 03/13/2020    EGD BIOPSY performed by Lynsey Chou MD at [facility] ENDOSCOPY    UPPER GASTROINTESTINAL ENDOSCOPY      UPPER GASTROINTESTINAL ENDOSCOPY N/A 10/29/2021    EGD BIOPSY performed by Lynsey Chou MD at [facility]

## 2024-10-23 NOTE — TELEPHONE ENCOUNTER
Pt calling again to check status of IUD. Number for [facility] sent via My Chart message and pt request. Please follow up with status check.

## 2024-10-28 ENCOUNTER — OFFICE VISIT (OUTPATIENT)
Dept: FAMILY MEDICINE CLINIC | Age: 34
End: 2024-10-28
Payer: COMMERCIAL

## 2024-10-28 VITALS
TEMPERATURE: 98.5 F | HEART RATE: 89 BPM | WEIGHT: 242 LBS | SYSTOLIC BLOOD PRESSURE: 110 MMHG | OXYGEN SATURATION: 97 % | BODY MASS INDEX: 41.54 KG/M2 | DIASTOLIC BLOOD PRESSURE: 79 MMHG

## 2024-10-28 DIAGNOSIS — U07.1 COVID-19: ICD-10-CM

## 2024-10-28 DIAGNOSIS — Z20.822 CLOSE EXPOSURE TO COVID-19 VIRUS: Primary | ICD-10-CM

## 2024-10-28 DIAGNOSIS — B34.9 ACUTE VIRAL SYNDROME: ICD-10-CM

## 2024-10-28 LAB
INFLUENZA A ANTIGEN, POC: NEGATIVE
INFLUENZA B ANTIGEN, POC: NEGATIVE
LOT EXPIRE DATE: ABNORMAL
LOT KIT NUMBER: ABNORMAL
SARS-COV-2, POC: DETECTED
VALID INTERNAL CONTROL: ABNORMAL
VENDOR AND KIT NAME POC: ABNORMAL

## 2024-10-28 PROCEDURE — 99213 OFFICE O/P EST LOW 20 MIN: CPT | Performed by: NURSE PRACTITIONER

## 2024-10-28 PROCEDURE — 1036F TOBACCO NON-USER: CPT | Performed by: NURSE PRACTITIONER

## 2024-10-28 PROCEDURE — G8417 CALC BMI ABV UP PARAM F/U: HCPCS | Performed by: NURSE PRACTITIONER

## 2024-10-28 PROCEDURE — G8427 DOCREV CUR MEDS BY ELIG CLIN: HCPCS | Performed by: NURSE PRACTITIONER

## 2024-10-28 PROCEDURE — G8484 FLU IMMUNIZE NO ADMIN: HCPCS | Performed by: NURSE PRACTITIONER

## 2024-10-28 PROCEDURE — 87428 SARSCOV & INF VIR A&B AG IA: CPT | Performed by: NURSE PRACTITIONER

## 2024-10-28 RX ORDER — FLUTICASONE PROPIONATE 50 MCG
2 SPRAY, SUSPENSION (ML) NASAL DAILY
Qty: 1 EACH | Refills: 5 | OUTPATIENT
Start: 2024-10-28

## 2024-10-28 RX ORDER — ALBUTEROL SULFATE 90 UG/1
INHALANT RESPIRATORY (INHALATION)
Qty: 18 G | Refills: 5 | Status: SHIPPED | OUTPATIENT
Start: 2024-10-28

## 2024-10-28 ASSESSMENT — ENCOUNTER SYMPTOMS
RHINORRHEA: 1
SINUS PRESSURE: 0
SHORTNESS OF BREATH: 1
VOMITING: 0
SINUS PAIN: 0
CHEST TIGHTNESS: 0
ABDOMINAL PAIN: 0
NAUSEA: 0
DIARRHEA: 1
WHEEZING: 0
SORE THROAT: 1
ABDOMINAL DISTENTION: 0
COUGH: 1

## 2024-10-28 NOTE — PATIENT INSTRUCTIONS
Please read the healthy family handout that you were given and share it with your family.       Please compare this printed medication list with your medications at home to be sure they are the same.  If you have any medications that are different please contact us immediately at [phone].     Also review your allergies that we have listed, these may also include medications that you have not been able to tolerate, make sure everything listed is correct. If you have any allergies that are different please contact us immediately at [phone].     You may receive a survey in the mail or by email asking about your experience during your visit today. Please complete and return to us so we know how we are serving you.

## 2024-10-28 NOTE — PROGRESS NOTES
CHIEF COMPLAINT  Chief Complaint   Patient presents with    Congestion    Headache    Fever    Allergies        HPI   Johana Alvarado is a 34 y.o. female who presents to the office complaining of exposure to COVID and flu.  Patient reports she was exposed to her boyfriend who was diagnosed with COVID and flu B last week.  Patient reports that her children were all diagnosed with COVID over the weekend.  Patient reports that she started with symptoms yesterday of shortness of breath, nasal congestion, and runny nose yesterday.  Patient also states that she has hives on her arms and neck.  Patient also reports she has had sore throat, fever, chills, fatigue, and diarrhea.  Patient denies any nausea or vomiting.  Patient does report that she had a breathing treatment last night when she was feeling short of breath which relieved some of her symptoms.  Patient reports taking Tylenol and Benadryl for her symptoms.  No other complaints, modifying factors or associated symptoms.     Nursing notes reviewed.   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    Chronic GERD 2020    Complication of anesthesia     asthma related-waking up    Depression     past history, now resolved    Diabetes mellitus (HCC)     pre diabetic; currently takes no meds    History of suicidal tendencies     as a teen    Infertility, female     Left carpal tunnel syndrome 2018    Migraine headache     last migraine 3 weeks ago     (normal spontaneous vaginal delivery) 2021    PCOS (polycystic ovarian syndrome)     Postpartum depression     Pre-diabetes     PTSD (post-traumatic stress disorder)     with rare psychotic features in past, symptoms resolved now    Renal cyst, right 2024    Scoliosis     Seizures ([facility])     as teen    Suicidal ideations     Syncope     during pregnancy

## 2024-11-11 DIAGNOSIS — F90.2 ATTENTION DEFICIT HYPERACTIVITY DISORDER (ADHD), COMBINED TYPE: ICD-10-CM

## 2024-11-11 RX ORDER — DEXTROAMPHETAMINE SACCHARATE, AMPHETAMINE ASPARTATE MONOHYDRATE, DEXTROAMPHETAMINE SULFATE AND AMPHETAMINE SULFATE 3.75; 3.75; 3.75; 3.75 MG/1; MG/1; MG/1; MG/1
15 CAPSULE, EXTENDED RELEASE ORAL DAILY
Qty: 30 CAPSULE | Refills: 0 | Status: SHIPPED | OUTPATIENT
Start: 2024-11-11 | End: 2024-12-11

## 2024-11-11 NOTE — TELEPHONE ENCOUNTER
Date of last refill of this med was 10/8/24, # of pills given 30 and # of refills given 0.  Their next appointment is 2/4/25, the last date patient was seen was 10/28/24.  Does patient have medication agreement on file? No  Has drug screen been done in last 12 months if needed? no

## 2024-11-11 NOTE — TELEPHONE ENCOUNTER
Controlled Substance Monitoring:    Acute and Chronic Pain Monitoring:   RX Monitoring Periodic Controlled Substance Monitoring   11/11/2024   9:06 AM No signs of potential drug abuse or diversion identified.

## 2024-11-12 RX ORDER — FLUTICASONE PROPIONATE 50 MCG
2 SPRAY, SUSPENSION (ML) NASAL DAILY
Qty: 1 EACH | Refills: 5 | Status: SHIPPED | OUTPATIENT
Start: 2024-11-12

## 2024-11-14 ENCOUNTER — OFFICE VISIT (OUTPATIENT)
Dept: OBGYN CLINIC | Age: 34
End: 2024-11-14

## 2024-11-14 VITALS
HEART RATE: 94 BPM | HEIGHT: 64 IN | BODY MASS INDEX: 41.32 KG/M2 | WEIGHT: 242 LBS | SYSTOLIC BLOOD PRESSURE: 116 MMHG | DIASTOLIC BLOOD PRESSURE: 82 MMHG | TEMPERATURE: 98.7 F | OXYGEN SATURATION: 99 %

## 2024-11-14 DIAGNOSIS — Z30.430 ENCOUNTER FOR IUD INSERTION: Primary | ICD-10-CM

## 2024-11-14 LAB
CONTROL: NORMAL
PREGNANCY TEST URINE, POC: NEGATIVE

## 2024-11-14 NOTE — PROGRESS NOTES
IUD Insertion Procedure Note    Pre-operative Diagnosis: Desires Mirena IUD    Post-operative Diagnosis: Same    Procedure Details   Urine pregnancy test was done and result was negative. The risks (including infection, bleeding, pain, and uterine perforation) and benefits of the procedure were explained to the patient and written informed consent was obtained. Speculum was then placed in the vagina and a single tooth tenaculum was applied to the anterior lip of the cervix.  The uterus was sounded to a depth of 9 cm.  The IUD was then placed according to  instructions, applicator removed and strings trimmed to 3cm.      The single tooth tenaculum was removed. Excellent hemostasis was assured. The speculum was then removed.      The patient tolerated the procedure well. She was given return precautions and verbally stated understanding.     IUD Information:  Type of IUD: Mirena  Condition: Stable     Date of insertion: 11/14/2024  Anticipated date of removal: 11/14/2032    Complications: None    Plan:  The patient was advised to call for any fever or for prolonged or severe pain or bleeding. She was advised to use OTC analgesics as needed for mild to moderate pain.     Layne Ruano MD

## 2024-11-15 ENCOUNTER — APPOINTMENT (OUTPATIENT)
Dept: ULTRASOUND IMAGING | Age: 34
End: 2024-11-15
Payer: COMMERCIAL

## 2024-11-15 ENCOUNTER — HOSPITAL ENCOUNTER (EMERGENCY)
Age: 34
Discharge: HOME | End: 2024-11-16
Attending: EMERGENCY MEDICINE
Payer: COMMERCIAL

## 2024-11-15 ENCOUNTER — TELEPHONE (OUTPATIENT)
Dept: OBGYN CLINIC | Age: 34
End: 2024-11-15

## 2024-11-15 DIAGNOSIS — N93.9 VAGINAL BLEEDING: Primary | ICD-10-CM

## 2024-11-15 LAB
ANION GAP SERPL CALCULATED.3IONS-SCNC: 12 MMOL/L (ref 3–16)
BASOPHILS # BLD: 0.1 K/UL (ref 0–0.2)
BASOPHILS NFR BLD: 0.8 %
BILIRUB UR QL STRIP.AUTO: NEGATIVE
BUN SERPL-MCNC: 13 MG/DL (ref 7–20)
CALCIUM SERPL-MCNC: 9.2 MG/DL (ref 8.3–10.6)
CHLORIDE SERPL-SCNC: 102 MMOL/L (ref 99–110)
CLARITY UR: ABNORMAL
CO2 SERPL-SCNC: 24 MMOL/L (ref 21–32)
COLOR UR: ABNORMAL
CREAT SERPL-MCNC: 0.8 MG/DL (ref 0.6–1.1)
DEPRECATED RDW RBC AUTO: 14.9 % (ref 12.4–15.4)
EOSINOPHIL # BLD: 0.1 K/UL (ref 0–0.6)
EOSINOPHIL NFR BLD: 2.1 %
EPI CELLS #/AREA URNS HPF: ABNORMAL /HPF (ref 0–5)
GFR SERPLBLD CREATININE-BSD FMLA CKD-EPI: >90 ML/MIN/{1.73_M2}
GLUCOSE SERPL-MCNC: 92 MG/DL (ref 70–99)
GLUCOSE UR STRIP.AUTO-MCNC: NEGATIVE MG/DL
HCG SERPL QL: NEGATIVE
HCT VFR BLD AUTO: 37.9 % (ref 36–48)
HGB BLD-MCNC: 12.3 G/DL (ref 12–16)
HGB UR QL STRIP.AUTO: ABNORMAL
KETONES UR STRIP.AUTO-MCNC: NEGATIVE MG/DL
LEUKOCYTE ESTERASE UR QL STRIP.AUTO: NEGATIVE
LYMPHOCYTES # BLD: 1.4 K/UL (ref 1–5.1)
LYMPHOCYTES NFR BLD: 22.3 %
MCH RBC QN AUTO: 26.6 PG (ref 26–34)
MCHC RBC AUTO-ENTMCNC: 32.6 G/DL (ref 31–36)
MCV RBC AUTO: 81.7 FL (ref 80–100)
MONOCYTES # BLD: 0.6 K/UL (ref 0–1.3)
MONOCYTES NFR BLD: 9.9 %
NEUTROPHILS # BLD: 4.2 K/UL (ref 1.7–7.7)
NEUTROPHILS NFR BLD: 64.9 %
NITRITE UR QL STRIP.AUTO: NEGATIVE
PH UR STRIP.AUTO: 6 [PH] (ref 5–8)
PLATELET # BLD AUTO: 182 K/UL (ref 135–450)
PMV BLD AUTO: 10 FL (ref 5–10.5)
POTASSIUM SERPL-SCNC: 3.9 MMOL/L (ref 3.5–5.1)
PROT UR STRIP.AUTO-MCNC: NEGATIVE MG/DL
RBC # BLD AUTO: 4.64 M/UL (ref 4–5.2)
RBC #/AREA URNS HPF: ABNORMAL /HPF (ref 0–4)
SODIUM SERPL-SCNC: 138 MMOL/L (ref 136–145)
SP GR UR STRIP.AUTO: 1.02 (ref 1–1.03)
UA COMPLETE W REFLEX CULTURE PNL UR: ABNORMAL
UA DIPSTICK W REFLEX MICRO PNL UR: YES
URN SPEC COLLECT METH UR: ABNORMAL
UROBILINOGEN UR STRIP-ACNC: 0.2 E.U./DL
WBC # BLD AUTO: 6.4 K/UL (ref 4–11)
WBC #/AREA URNS HPF: ABNORMAL /HPF (ref 0–5)

## 2024-11-15 PROCEDURE — 76830 TRANSVAGINAL US NON-OB: CPT

## 2024-11-15 PROCEDURE — 80048 BASIC METABOLIC PNL TOTAL CA: CPT

## 2024-11-15 PROCEDURE — 36415 COLL VENOUS BLD VENIPUNCTURE: CPT

## 2024-11-15 PROCEDURE — 84703 CHORIONIC GONADOTROPIN ASSAY: CPT

## 2024-11-15 PROCEDURE — 99284 EMERGENCY DEPT VISIT MOD MDM: CPT

## 2024-11-15 PROCEDURE — 81001 URINALYSIS AUTO W/SCOPE: CPT

## 2024-11-15 PROCEDURE — 85025 COMPLETE CBC W/AUTO DIFF WBC: CPT

## 2024-11-15 PROCEDURE — 6360000002 HC RX W HCPCS: Performed by: EMERGENCY MEDICINE

## 2024-11-15 RX ORDER — KETOROLAC TROMETHAMINE 30 MG/ML
15 INJECTION, SOLUTION INTRAMUSCULAR; INTRAVENOUS ONCE
Status: DISCONTINUED | OUTPATIENT
Start: 2024-11-15 | End: 2024-11-16 | Stop reason: HOSPADM

## 2024-11-15 ASSESSMENT — PAIN SCALES - GENERAL: PAINLEVEL_OUTOF10: 3

## 2024-11-15 ASSESSMENT — PAIN DESCRIPTION - DESCRIPTORS: DESCRIPTORS: ACHING

## 2024-11-15 ASSESSMENT — PAIN DESCRIPTION - LOCATION: LOCATION: ABDOMEN

## 2024-11-15 ASSESSMENT — PAIN - FUNCTIONAL ASSESSMENT: PAIN_FUNCTIONAL_ASSESSMENT: 0-10

## 2024-11-16 VITALS
HEIGHT: 64 IN | TEMPERATURE: 98.1 F | SYSTOLIC BLOOD PRESSURE: 116 MMHG | BODY MASS INDEX: 41.38 KG/M2 | OXYGEN SATURATION: 98 % | HEART RATE: 80 BPM | RESPIRATION RATE: 16 BRPM | DIASTOLIC BLOOD PRESSURE: 80 MMHG | WEIGHT: 242.4 LBS

## 2024-11-16 RX ORDER — NAPROXEN 500 MG/1
500 TABLET ORAL 2 TIMES DAILY PRN
Qty: 60 TABLET | Refills: 0 | Status: SHIPPED | OUTPATIENT
Start: 2024-11-16 | End: 2024-11-16

## 2024-11-16 NOTE — ED PROVIDER NOTES
Ultrasound obtained shows an IUD that appears to be in proper place.  Endometrium noted to be slightly thickened.  On evaluation patient reports improvement of bleeding.  She states he is unable to tolerate NSAIDs and will be discharged home to follow-up with OB/GYN.     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    Chronic GERD 2020    Complication of anesthesia     asthma related-waking up    Depression     past history, now resolved    Diabetes mellitus (HCC)     pre diabetic; currently takes no meds    History of suicidal tendencies     as a teen    Infertility, female     Left carpal tunnel syndrome 2018    Migraine headache     last migraine 3 weeks ago     (normal spontaneous vaginal delivery) 2021    PCOS (polycystic ovarian syndrome)     Postpartum depression     Pre-diabetes     PTSD (post-traumatic stress disorder)     with rare psychotic features in past, symptoms resolved now    Renal cyst, right 2024    Scoliosis     Seizures (HCC)     as teen    Suicidal ideations     Syncope     during pregnancy    Tachycardia     slight arrythmia    Trauma          Records Reviewed (External and source): Reviewed patient's most to recent OB note from 2024.     Disposition Considerations (include 1 Tests not done, Admit vs D/C, Shared Decision Making, Pt Expectation of Test or Tx.): Consider obtaining a gonorrhea chlamydia testing patient reports low suspicion for STI exposure.     Admission to the hospital considered but patient's symptoms are improving.  Vital signs and testing performed is reassuring.  Based on this patient is appropriate for outpatient management.  No indication for admission at this time.     Symptomatic treatment with expectant management discussed with

## 2024-11-16 NOTE — TELEPHONE ENCOUNTER
Pt called in complaining of very heavy bleeding tonight.   Mirena was placed by Dr. Ruano on 11/14/24    Had just got home from work and felt a tug.   Had had light bleeding since yesterday, then tonight passed several large clots .   Some light headedness / Denies presyncope or shortness of breath.    Gets iron infusions at  due to anemia, unsure why, concerned for auto-immune disease.   She underwent an AB in September. UPT was negative prior to IUD insertion.   Recommended she present to ED for evaluation due to reported heavy, brisk bleeding.   Pt verbalized understanding and will try to present to [facility] ED.     [facility]

## 2024-12-02 ENCOUNTER — OFFICE VISIT (OUTPATIENT)
Dept: FAMILY MEDICINE CLINIC | Age: 34
End: 2024-12-02
Payer: COMMERCIAL

## 2024-12-02 VITALS
SYSTOLIC BLOOD PRESSURE: 108 MMHG | OXYGEN SATURATION: 98 % | TEMPERATURE: 98.6 F | DIASTOLIC BLOOD PRESSURE: 76 MMHG | HEART RATE: 90 BPM | BODY MASS INDEX: 41.2 KG/M2 | WEIGHT: 240 LBS

## 2024-12-02 DIAGNOSIS — J02.9 SORETHROAT: Primary | ICD-10-CM

## 2024-12-02 DIAGNOSIS — F90.2 ATTENTION DEFICIT HYPERACTIVITY DISORDER (ADHD), COMBINED TYPE: ICD-10-CM

## 2024-12-02 DIAGNOSIS — R22.1 NECK SWELLING: ICD-10-CM

## 2024-12-02 LAB
INFLUENZA A ANTIGEN, POC: NEGATIVE
INFLUENZA B ANTIGEN, POC: NEGATIVE
LOT EXPIRE DATE: 0
LOT KIT NUMBER: 0
S PYO AG THROAT QL: NORMAL
SARS-COV-2, POC: NORMAL
VALID INTERNAL CONTROL: 0
VENDOR AND KIT NAME POC: NORMAL

## 2024-12-02 PROCEDURE — 99213 OFFICE O/P EST LOW 20 MIN: CPT | Performed by: NURSE PRACTITIONER

## 2024-12-02 PROCEDURE — G8484 FLU IMMUNIZE NO ADMIN: HCPCS | Performed by: NURSE PRACTITIONER

## 2024-12-02 PROCEDURE — 87880 STREP A ASSAY W/OPTIC: CPT | Performed by: NURSE PRACTITIONER

## 2024-12-02 PROCEDURE — G8417 CALC BMI ABV UP PARAM F/U: HCPCS | Performed by: NURSE PRACTITIONER

## 2024-12-02 PROCEDURE — G8427 DOCREV CUR MEDS BY ELIG CLIN: HCPCS | Performed by: NURSE PRACTITIONER

## 2024-12-02 PROCEDURE — 1036F TOBACCO NON-USER: CPT | Performed by: NURSE PRACTITIONER

## 2024-12-02 PROCEDURE — 87428 SARSCOV & INF VIR A&B AG IA: CPT | Performed by: NURSE PRACTITIONER

## 2024-12-02 ASSESSMENT — ENCOUNTER SYMPTOMS
EYES NEGATIVE: 1
TROUBLE SWALLOWING: 1
COUGH: 1
GASTROINTESTINAL NEGATIVE: 1

## 2024-12-02 NOTE — PATIENT INSTRUCTIONS
Please read the healthy family handout that you were given and share it with your family.       Please compare this printed medication list with your medications at home to be sure they are the same.  If you have any medications that are different please contact us immediately at [phone].     Also review your allergies that we have listed, these may also include medications that you have not been able to tolerate, make sure everything listed is correct. If you have any allergies that are different please contact us immediately at [phone].     You may receive a survey in the mail or by email asking about your experience during your visit today. Please complete and return to us so we know how we are serving you.

## 2024-12-02 NOTE — TELEPHONE ENCOUNTER
Amphetamine-Dextroamphetamine 15 MG    [facility]    Last OV- 9/24/24    Next OV- 2/4/25

## 2024-12-02 NOTE — PROGRESS NOTES
CHIEF COMPLAINT  Chief Complaint   Patient presents with    Cough    Neck Swelling     Trouble swallowing        HPI   Johana Alvarado is a 34 y.o. female who presents to the office complaining of cough, swallowing and neck swelling.  Patient reports that she has had symptoms for a while now.  Patient reports over the past 1 to 2 weeks symptoms have returned.  Patient denies any dizziness lightheadedness, chest pain.  No reported fever but has reported some night sweats at times.  Patient feels that she has swelling in the anterior portion of her neck and \"popping sensation when she pushes on her throat and neck.\"  Patient eating and drinking appropriately.  No other complaints, modifying factors or associated symptoms.     Nursing notes reviewed.   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    Chronic GERD 2020    Complication of anesthesia     asthma related-waking up    Depression     past history, now resolved    Diabetes mellitus (HCC)     pre diabetic; currently takes no meds    History of suicidal tendencies     as a teen    Infertility, female     Left carpal tunnel syndrome 2018    Migraine headache     last migraine 3 weeks ago     (normal spontaneous vaginal delivery) 2021    PCOS (polycystic ovarian syndrome)     Postpartum depression     Pre-diabetes     PTSD (post-traumatic stress disorder)     with rare psychotic features in past, symptoms resolved now    Renal cyst, right 2024    Scoliosis     Seizures (HCC)     as teen    Suicidal ideations     Syncope     during pregnancy    Tachycardia     slight arrythmia    Trauma      Past Surgical History:   Procedure Laterality Date    CHOLECYSTECTOMY  13    CHOLECYSTECTOMY, LAPAROSCOPIC  2013    ENDOSCOPY, COLON, DIAGNOSTIC      HIATAL HERNIA REPAIR N/A 2022    LAPAROSCOPIC

## 2024-12-03 ENCOUNTER — HOSPITAL ENCOUNTER (OUTPATIENT)
Dept: ULTRASOUND IMAGING | Age: 34
Discharge: HOME | End: 2024-12-03
Payer: COMMERCIAL

## 2024-12-03 DIAGNOSIS — J02.9 SORETHROAT: ICD-10-CM

## 2024-12-03 DIAGNOSIS — R22.1 NECK SWELLING: ICD-10-CM

## 2024-12-03 PROCEDURE — 76536 US EXAM OF HEAD AND NECK: CPT

## 2024-12-03 RX ORDER — DEXTROAMPHETAMINE SACCHARATE, AMPHETAMINE ASPARTATE MONOHYDRATE, DEXTROAMPHETAMINE SULFATE AND AMPHETAMINE SULFATE 3.75; 3.75; 3.75; 3.75 MG/1; MG/1; MG/1; MG/1
15 CAPSULE, EXTENDED RELEASE ORAL DAILY
Qty: 30 CAPSULE | Refills: 0 | Status: SHIPPED | OUTPATIENT
Start: 2024-12-03 | End: 2025-01-02

## 2024-12-05 ENCOUNTER — TELEPHONE (OUTPATIENT)
Dept: FAMILY MEDICINE CLINIC | Age: 34
End: 2024-12-05

## 2024-12-05 DIAGNOSIS — J02.9 SORETHROAT: Primary | ICD-10-CM

## 2024-12-05 DIAGNOSIS — E04.1 THYROID NODULE: ICD-10-CM

## 2024-12-05 DIAGNOSIS — R22.1 NECK SWELLING: Primary | ICD-10-CM

## 2024-12-05 DIAGNOSIS — J04.0 HAEMOPHILUS INFLUENZAE LARYNGITIS: ICD-10-CM

## 2024-12-05 DIAGNOSIS — B96.3 HAEMOPHILUS INFLUENZAE LARYNGITIS: ICD-10-CM

## 2024-12-05 LAB
BACTERIA THROAT AEROBE CULT: ABNORMAL
BACTERIA THROAT AEROBE CULT: ABNORMAL
ORGANISM: ABNORMAL

## 2024-12-05 RX ORDER — SULFAMETHOXAZOLE AND TRIMETHOPRIM 800; 160 MG/1; MG/1
1 TABLET ORAL 2 TIMES DAILY
Qty: 20 TABLET | Refills: 0 | Status: SHIPPED | OUTPATIENT
Start: 2024-12-05 | End: 2024-12-15

## 2024-12-05 NOTE — TELEPHONE ENCOUNTER
Pt called asking if the bacteria identified by her recent throat culture was contagious. 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 I spoke with Ruthy and she said due to the species being bacterial, it is contagious. She suggested returning after 24 hours of antibiotic use. Pt made aware on phone call.

## 2024-12-13 ENCOUNTER — OFFICE VISIT (OUTPATIENT)
Dept: OBGYN CLINIC | Age: 34
End: 2024-12-13

## 2024-12-13 VITALS
TEMPERATURE: 98.3 F | DIASTOLIC BLOOD PRESSURE: 78 MMHG | OXYGEN SATURATION: 100 % | BODY MASS INDEX: 41.42 KG/M2 | HEART RATE: 90 BPM | SYSTOLIC BLOOD PRESSURE: 124 MMHG | WEIGHT: 242.6 LBS | HEIGHT: 64 IN

## 2024-12-13 DIAGNOSIS — Z01.419 ENCOUNTER FOR ANNUAL ROUTINE GYNECOLOGICAL EXAMINATION: Primary | ICD-10-CM

## 2024-12-13 DIAGNOSIS — Z12.4 PAP SMEAR FOR CERVICAL CANCER SCREENING: ICD-10-CM

## 2024-12-13 DIAGNOSIS — Z30.431 IUD CHECK UP: ICD-10-CM

## 2024-12-13 DIAGNOSIS — Z30.432 ENCOUNTER FOR IUD REMOVAL: ICD-10-CM

## 2024-12-13 NOTE — PROGRESS NOTES
Annual Exam      CC:   Chief Complaint   Patient presents with    Annual Exam     Bleed for 3 1/2 weeks and that she went to the ER.. They stated the IUD is low lying but functional .       HPI:  34 y.o.  presents for her gynecologic annual exam. Also here for IUD string check. Had Mirena IUD placed 24, had a lot of heavy bleeding right after it was placed. Seen in ER and had US (see below). Bleeding has since stopped, no pain. Does have some bleeding and pain after intercourse.    Patient seen and examined.     Review of Systems:   Review of Systems   Respiratory:  Negative for shortness of breath.    Cardiovascular:  Negative for chest pain.   Gastrointestinal:  Negative for abdominal pain, constipation, diarrhea, nausea and vomiting.   Genitourinary:  Positive for dyspareunia and vaginal bleeding. Negative for difficulty urinating, dysuria, menstrual problem and vaginal discharge.   Neurological:  Negative for headaches.       Primary Care Physician: Favian Mosquera MD    Obstetric History  OB History    Para Term  AB Living   5 4 4   1 4   SAB IAB Ectopic Molar Multiple Live Births   1       0 4      # Outcome Date GA Lbr Jose/2nd Weight Sex Type Anes PTL Lv   5 Term 23 38w2d 03:53 / 01:20 3.142 kg (6 lb 14.8 oz) M Vag-Spont EPI N ALEJANDRA   4 Term 21 39w4d 19:14 / 00:08 3.916 kg (8 lb 10.1 oz) F Vag-Spont None N ALEJANDRA      Birth Comments: arteaga; ID Band # [id number]   3 SAB 20    U       2 Term 19 39w3d 05:00 / 00:17 3.637 kg (8 lb 0.3 oz) M Vag-Spont None N ALEJANDRA      Birth Comments: ARTEAGA   1 Term 17 40w3d  3.118 kg (6 lb 14 oz) M Vag-Spont EPI N ALEJANDRA       Gynecologic History  Menstrual History:  LMP: see above  Menstrual pattern: see above  Sexual History:  Contraception: Mirena IUD  Currently is sexually active  Denies history of STIs  No sexual problems  Pap History:  Last pap smear: 2021 NILM/HPV neg  History of abnormal pap smears:

## 2024-12-14 ASSESSMENT — ENCOUNTER SYMPTOMS
SHORTNESS OF BREATH: 0
NAUSEA: 0
VOMITING: 0
ABDOMINAL PAIN: 0
DIARRHEA: 0
CONSTIPATION: 0

## 2024-12-16 LAB
HPV HR 12 DNA SPEC QL NAA+PROBE: NOT DETECTED
HPV16 DNA SPEC QL NAA+PROBE: NOT DETECTED
HPV16+18+H RISK 12 DNA SPEC-IMP: NORMAL
HPV18 DNA SPEC QL NAA+PROBE: NOT DETECTED

## 2024-12-27 ENCOUNTER — PATIENT MESSAGE (OUTPATIENT)
Dept: OBGYN CLINIC | Age: 34
End: 2024-12-27

## 2024-12-28 NOTE — TELEPHONE ENCOUNTER
Would recommend patient abstain from intercourse until she comes in for IUD replacement and we will do a pregnancy test at that time before placing it. Thanks.

## 2025-01-20 DIAGNOSIS — F90.2 ATTENTION DEFICIT HYPERACTIVITY DISORDER (ADHD), COMBINED TYPE: ICD-10-CM

## 2025-01-21 RX ORDER — DEXTROAMPHETAMINE SACCHARATE, AMPHETAMINE ASPARTATE MONOHYDRATE, DEXTROAMPHETAMINE SULFATE AND AMPHETAMINE SULFATE 3.75; 3.75; 3.75; 3.75 MG/1; MG/1; MG/1; MG/1
15 CAPSULE, EXTENDED RELEASE ORAL DAILY
Qty: 30 CAPSULE | Refills: 0 | Status: SHIPPED | OUTPATIENT
Start: 2025-01-21 | End: 2025-02-20

## 2025-01-29 ENCOUNTER — OFFICE VISIT (OUTPATIENT)
Dept: ENT CLINIC | Age: 35
End: 2025-01-29
Payer: COMMERCIAL

## 2025-01-29 VITALS
HEART RATE: 102 BPM | BODY MASS INDEX: 41.32 KG/M2 | SYSTOLIC BLOOD PRESSURE: 140 MMHG | WEIGHT: 242 LBS | HEIGHT: 64 IN | DIASTOLIC BLOOD PRESSURE: 79 MMHG

## 2025-01-29 DIAGNOSIS — J30.9 ALLERGIC RHINITIS, UNSPECIFIED SEASONALITY, UNSPECIFIED TRIGGER: ICD-10-CM

## 2025-01-29 DIAGNOSIS — E04.2 MULTINODULAR THYROID: Primary | ICD-10-CM

## 2025-01-29 PROCEDURE — 1036F TOBACCO NON-USER: CPT | Performed by: OTOLARYNGOLOGY

## 2025-01-29 PROCEDURE — G8417 CALC BMI ABV UP PARAM F/U: HCPCS | Performed by: OTOLARYNGOLOGY

## 2025-01-29 PROCEDURE — 99204 OFFICE O/P NEW MOD 45 MIN: CPT | Performed by: OTOLARYNGOLOGY

## 2025-01-29 PROCEDURE — G8427 DOCREV CUR MEDS BY ELIG CLIN: HCPCS | Performed by: OTOLARYNGOLOGY

## 2025-01-29 RX ORDER — BUTALBITAL, ACETAMINOPHEN AND CAFFEINE 50; 325; 40 MG/1; MG/1; MG/1
1 TABLET ORAL EVERY 4 HOURS PRN
COMMUNITY

## 2025-01-29 RX ORDER — AZELASTINE 1 MG/ML
1 SPRAY, METERED NASAL 2 TIMES DAILY
Qty: 60 ML | Refills: 2 | Status: SHIPPED | OUTPATIENT
Start: 2025-01-29

## 2025-01-29 RX ORDER — ONDANSETRON 4 MG/1
4 TABLET, FILM COATED ORAL PRN
COMMUNITY

## 2025-01-29 RX ORDER — FEXOFENADINE HCL 180 MG/1
180 TABLET ORAL DAILY
COMMUNITY

## 2025-01-29 RX ORDER — MONTELUKAST SODIUM 10 MG/1
10 TABLET ORAL NIGHTLY
Qty: 30 TABLET | Refills: 3 | Status: SHIPPED | OUTPATIENT
Start: 2025-01-29

## 2025-01-29 ASSESSMENT — ENCOUNTER SYMPTOMS
COUGH: 0
SINUS PRESSURE: 0
APNEA: 0
TROUBLE SWALLOWING: 0
VOICE CHANGE: 0
SORE THROAT: 0
EYE ITCHING: 0
SHORTNESS OF BREATH: 0
FACIAL SWELLING: 0

## 2025-01-29 NOTE — TELEPHONE ENCOUNTER
If we are not putting it in when she is on her menses then yes, she will need 2 step process if she has been sexually active. Thanks.

## 2025-01-29 NOTE — TELEPHONE ENCOUNTER
Pt called in today to r/s 1/30/24 appt d/t to emergency. Pt needed to schedule in later afternoon d/t work. Offered a couple appts in feb but did not work with pt schedule. Pt states she did have a cycle early January. Pt states she did not know she was supposed to abstain from intercourse. Pt has had intercourse. Pt r/s early March per her availability. Please advise on if pt needs 2 step scheduled prior.

## 2025-01-29 NOTE — PROGRESS NOTES
every 3-6  months      prazosin (MINIPRESS) 1 MG capsule Take 1 capsule by mouth nightly 30 capsule 3    budesonide-formoterol (SYMBICORT) 160-4.5 MCG/ACT AERO Inhale 1 puff into the lungs 2 times daily 1 each 5    albuterol (PROVENTIL) (2.5 MG/3ML) 0.083% nebulizer solution Take 3 mLs by nebulization every 4 hours as needed for Wheezing or Shortness of Breath 120 each 3     No current facility-administered medications for this visit.       Review of Systems     Review of Systems   Constitutional:  Negative for appetite change, chills, fatigue, fever and unexpected weight change.   HENT:  Negative for congestion, ear discharge, ear pain, facial swelling, hearing loss, nosebleeds, postnasal drip, sinus pressure, sneezing, sore throat, tinnitus, trouble swallowing and voice change.    Eyes:  Negative for itching.   Respiratory:  Negative for apnea, cough and shortness of breath.    Endocrine: Negative for cold intolerance and heat intolerance.   Musculoskeletal:  Negative for myalgias and neck pain.   Skin:  Negative for rash.   Allergic/Immunologic: Negative for environmental allergies.   Neurological:  Negative for dizziness and headaches.   Psychiatric/Behavioral:  Negative for confusion, decreased concentration and sleep disturbance.          PhysicalExam     Vitals:    01/29/25 0810   BP: (!) 140/79   Pulse: (!) 102   Weight: 109.8 kg (242 lb)   Height: 1.626 m (5' 4\")       Physical Exam  Constitutional:       General: She is not in acute distress.     Appearance: She is well-developed.   HENT:      Head: Normocephalic and atraumatic.      Right Ear: Tympanic membrane, ear canal and external ear normal. No drainage. No middle ear effusion. Tympanic membrane is not bulging. Tympanic membrane has normal mobility.      Left Ear: Tympanic membrane, ear canal and external ear normal. No drainage.  No middle ear effusion. Tympanic membrane is not bulging. Tympanic membrane has normal mobility.      Nose: No mucosal

## 2025-02-04 ENCOUNTER — OFFICE VISIT (OUTPATIENT)
Dept: FAMILY MEDICINE CLINIC | Age: 35
End: 2025-02-04
Payer: COMMERCIAL

## 2025-02-04 VITALS
WEIGHT: 248 LBS | TEMPERATURE: 98 F | SYSTOLIC BLOOD PRESSURE: 135 MMHG | DIASTOLIC BLOOD PRESSURE: 68 MMHG | BODY MASS INDEX: 42.57 KG/M2 | HEART RATE: 80 BPM | OXYGEN SATURATION: 98 %

## 2025-02-04 DIAGNOSIS — B37.2 YEAST DERMATITIS: ICD-10-CM

## 2025-02-04 DIAGNOSIS — F33.1 MODERATE EPISODE OF RECURRENT MAJOR DEPRESSIVE DISORDER (HCC): ICD-10-CM

## 2025-02-04 DIAGNOSIS — F43.10 PTSD (POST-TRAUMATIC STRESS DISORDER): ICD-10-CM

## 2025-02-04 DIAGNOSIS — M79.89 LEG SWELLING: ICD-10-CM

## 2025-02-04 DIAGNOSIS — D50.8 IRON DEFICIENCY ANEMIA SECONDARY TO INADEQUATE DIETARY IRON INTAKE: ICD-10-CM

## 2025-02-04 DIAGNOSIS — F51.01 PRIMARY INSOMNIA: ICD-10-CM

## 2025-02-04 DIAGNOSIS — J45.20 MILD INTERMITTENT ASTHMA WITHOUT COMPLICATION: ICD-10-CM

## 2025-02-04 DIAGNOSIS — F90.2 ATTENTION DEFICIT HYPERACTIVITY DISORDER (ADHD), COMBINED TYPE: Primary | ICD-10-CM

## 2025-02-04 DIAGNOSIS — L71.0 PERIORAL DERMATITIS: ICD-10-CM

## 2025-02-04 DIAGNOSIS — E04.1 THYROID NODULE: ICD-10-CM

## 2025-02-04 PROBLEM — F32.1 CURRENT MODERATE EPISODE OF MAJOR DEPRESSIVE DISORDER WITHOUT PRIOR EPISODE (HCC): Status: ACTIVE | Noted: 2020-01-10

## 2025-02-04 PROCEDURE — 36415 COLL VENOUS BLD VENIPUNCTURE: CPT | Performed by: FAMILY MEDICINE

## 2025-02-04 PROCEDURE — G8417 CALC BMI ABV UP PARAM F/U: HCPCS | Performed by: FAMILY MEDICINE

## 2025-02-04 PROCEDURE — 1036F TOBACCO NON-USER: CPT | Performed by: FAMILY MEDICINE

## 2025-02-04 PROCEDURE — 99215 OFFICE O/P EST HI 40 MIN: CPT | Performed by: FAMILY MEDICINE

## 2025-02-04 PROCEDURE — G8427 DOCREV CUR MEDS BY ELIG CLIN: HCPCS | Performed by: FAMILY MEDICINE

## 2025-02-04 RX ORDER — PRAZOSIN HYDROCHLORIDE 2 MG/1
2 CAPSULE ORAL NIGHTLY
Qty: 90 CAPSULE | Refills: 3 | Status: SHIPPED | OUTPATIENT
Start: 2025-02-04

## 2025-02-04 RX ORDER — DEXTROAMPHETAMINE SACCHARATE, AMPHETAMINE ASPARTATE MONOHYDRATE, DEXTROAMPHETAMINE SULFATE AND AMPHETAMINE SULFATE 7.5; 7.5; 7.5; 7.5 MG/1; MG/1; MG/1; MG/1
30 CAPSULE, EXTENDED RELEASE ORAL DAILY
Qty: 30 CAPSULE | Refills: 0 | Status: SHIPPED | OUTPATIENT
Start: 2025-02-04 | End: 2025-03-06

## 2025-02-04 RX ORDER — METRONIDAZOLE 7.5 MG/G
GEL TOPICAL
Qty: 45 G | Refills: 0 | Status: SHIPPED | OUTPATIENT
Start: 2025-02-04

## 2025-02-04 SDOH — ECONOMIC STABILITY: FOOD INSECURITY: WITHIN THE PAST 12 MONTHS, THE FOOD YOU BOUGHT JUST DIDN'T LAST AND YOU DIDN'T HAVE MONEY TO GET MORE.: SOMETIMES TRUE

## 2025-02-04 SDOH — ECONOMIC STABILITY: FOOD INSECURITY: WITHIN THE PAST 12 MONTHS, YOU WORRIED THAT YOUR FOOD WOULD RUN OUT BEFORE YOU GOT MONEY TO BUY MORE.: SOMETIMES TRUE

## 2025-02-04 SDOH — ECONOMIC STABILITY: TRANSPORTATION INSECURITY
IN THE PAST 12 MONTHS, HAS THE LACK OF TRANSPORTATION KEPT YOU FROM MEDICAL APPOINTMENTS OR FROM GETTING MEDICATIONS?: NO

## 2025-02-04 SDOH — ECONOMIC STABILITY: INCOME INSECURITY: IN THE LAST 12 MONTHS, WAS THERE A TIME WHEN YOU WERE NOT ABLE TO PAY THE MORTGAGE OR RENT ON TIME?: YES

## 2025-02-04 ASSESSMENT — PATIENT HEALTH QUESTIONNAIRE - PHQ9
SUM OF ALL RESPONSES TO PHQ9 QUESTIONS 1 & 2: 0
1. LITTLE INTEREST OR PLEASURE IN DOING THINGS: NOT AT ALL
7. TROUBLE CONCENTRATING ON THINGS, SUCH AS READING THE NEWSPAPER OR WATCHING TELEVISION: MORE THAN HALF THE DAYS
4. FEELING TIRED OR HAVING LITTLE ENERGY: MORE THAN HALF THE DAYS
10. IF YOU CHECKED OFF ANY PROBLEMS, HOW DIFFICULT HAVE THESE PROBLEMS MADE IT FOR YOU TO DO YOUR WORK, TAKE CARE OF THINGS AT HOME, OR GET ALONG WITH OTHER PEOPLE: SOMEWHAT DIFFICULT
9. THOUGHTS THAT YOU WOULD BE BETTER OFF DEAD, OR OF HURTING YOURSELF: NOT AT ALL
7. TROUBLE CONCENTRATING ON THINGS, SUCH AS READING THE NEWSPAPER OR WATCHING TELEVISION: MORE THAN HALF THE DAYS
SUM OF ALL RESPONSES TO PHQ QUESTIONS 1-9: 11
3. TROUBLE FALLING OR STAYING ASLEEP: NEARLY EVERY DAY
SUM OF ALL RESPONSES TO PHQ QUESTIONS 1-9: 11
6. FEELING BAD ABOUT YOURSELF - OR THAT YOU ARE A FAILURE OR HAVE LET YOURSELF OR YOUR FAMILY DOWN: SEVERAL DAYS
2. FEELING DOWN, DEPRESSED OR HOPELESS: NOT AT ALL
5. POOR APPETITE OR OVEREATING: SEVERAL DAYS
2. FEELING DOWN, DEPRESSED OR HOPELESS: NOT AT ALL
10. IF YOU CHECKED OFF ANY PROBLEMS, HOW DIFFICULT HAVE THESE PROBLEMS MADE IT FOR YOU TO DO YOUR WORK, TAKE CARE OF THINGS AT HOME, OR GET ALONG WITH OTHER PEOPLE: SOMEWHAT DIFFICULT
SUM OF ALL RESPONSES TO PHQ QUESTIONS 1-9: 11
8. MOVING OR SPEAKING SO SLOWLY THAT OTHER PEOPLE COULD HAVE NOTICED. OR THE OPPOSITE - BEING SO FIDGETY OR RESTLESS THAT YOU HAVE BEEN MOVING AROUND A LOT MORE THAN USUAL: MORE THAN HALF THE DAYS
3. TROUBLE FALLING OR STAYING ASLEEP: NEARLY EVERY DAY
9. THOUGHTS THAT YOU WOULD BE BETTER OFF DEAD, OR OF HURTING YOURSELF: NOT AT ALL
1. LITTLE INTEREST OR PLEASURE IN DOING THINGS: NOT AT ALL
4. FEELING TIRED OR HAVING LITTLE ENERGY: MORE THAN HALF THE DAYS
8. MOVING OR SPEAKING SO SLOWLY THAT OTHER PEOPLE COULD HAVE NOTICED. OR THE OPPOSITE, BEING SO FIGETY OR RESTLESS THAT YOU HAVE BEEN MOVING AROUND A LOT MORE THAN USUAL: MORE THAN HALF THE DAYS
SUM OF ALL RESPONSES TO PHQ QUESTIONS 1-9: 11
SUM OF ALL RESPONSES TO PHQ QUESTIONS 1-9: 11
5. POOR APPETITE OR OVEREATING: SEVERAL DAYS
6. FEELING BAD ABOUT YOURSELF - OR THAT YOU ARE A FAILURE OR HAVE LET YOURSELF OR YOUR FAMILY DOWN: SEVERAL DAYS

## 2025-02-04 NOTE — PROGRESS NOTES
She presents today for routine follow-up of her ADHD and asthma and other health issues and her depression screen was positive.  She had multiple issues she wanted to talk about.  She states she needs a higher dose of the ADD meds because the current dose is not helping her focus and the prazosin is not helping her nightmares she would like to increase dose of that.  She is a little depressed but she states it is situational because she does not have custody of her 3 oldest children.  She continues to have swelling in her legs despite weight loss and she would like to get further treatment for that.  She has a rash around her mouth.  She needs to get follow-up labs since she had iron infusion at .  She is doing a walking program but she still having fatigue and struggling but she is hoping her iron levels are back to normal and she is no longer anemic.  She still struggles with insomnia.  Tests and documents reviewed today: Medication list     Objective:   /68   Pulse 80   Temp 98 °F (36.7 °C)   Wt 112.5 kg (248 lb)   SpO2 98%   BMI 42.57 kg/m²   BP Readings from Last 3 Encounters:   02/04/25 135/68   01/29/25 (!) 140/79   12/13/24 124/78     Physical Exam  Vitals reviewed.   Constitutional:       Appearance: She is well-developed. She is not toxic-appearing.   HENT:      Head: Normocephalic.   Neck:      Thyroid: No thyroid mass or thyromegaly.   Cardiovascular:      Rate and Rhythm: Normal rate and regular rhythm.      Heart sounds: Normal heart sounds. No murmur heard.  Pulmonary:      Effort: No respiratory distress.      Breath sounds: Normal breath sounds. No wheezing or rales.   Abdominal:      General: There is no distension.      Palpations: Abdomen is soft. There is no mass.      Tenderness: There is no abdominal tenderness. There is no guarding or rebound.   Musculoskeletal:      Cervical back: Neck supple.      Right lower leg: Edema present.      Left lower leg: Edema present.

## 2025-02-04 NOTE — PATIENT INSTRUCTIONS
Please read the healthy family handout that you were given and share it with your family.       Please compare this printed medication list with your medications at home to be sure they are the same.  If you have any medications that are different please contact us immediately at [phone].     Also review your allergies that we have listed, these may also include medications that you have not been able to tolerate, make sure everything listed is correct. If you have any allergies that are different please contact us immediately at [phone].     You may receive a survey in the mail or by email asking about your experience during your visit today. Please complete and return to us so we know how we are serving you.

## 2025-02-05 LAB
DEPRECATED RDW RBC AUTO: 15.3 % (ref 12.4–15.4)
FERRITIN SERPL IA-MCNC: 27.5 NG/ML (ref 15–150)
HCT VFR BLD AUTO: 38.3 % (ref 36–48)
HGB BLD-MCNC: 12.6 G/DL (ref 12–16)
IRON SATN MFR SERPL: 8 % (ref 15–50)
IRON SERPL-MCNC: 28 UG/DL (ref 37–145)
MCH RBC QN AUTO: 26.4 PG (ref 26–34)
MCHC RBC AUTO-ENTMCNC: 33 G/DL (ref 31–36)
MCV RBC AUTO: 80 FL (ref 80–100)
PLATELET # BLD AUTO: 222 K/UL (ref 135–450)
PMV BLD AUTO: 11.2 FL (ref 5–10.5)
RBC # BLD AUTO: 4.79 M/UL (ref 4–5.2)
TIBC SERPL-MCNC: 330 UG/DL (ref 260–445)
TSH SERPL DL<=0.005 MIU/L-ACNC: 0.89 UIU/ML (ref 0.27–4.2)
WBC # BLD AUTO: 8.3 K/UL (ref 4–11)

## 2025-02-05 NOTE — TELEPHONE ENCOUNTER
Called pt to let her know this information. Pt states she is on her period now. Let her know she can abstain from intercourse until she gets it put in or we can schedule a two step. Pt stated she was driving and couldn't concentrate and to just set something up and send it to her through [employer]. Pt states she needs earliest or latest in the day. Scheduled pt for lab visit for 2/24/25 for pregnancy test. Done.

## 2025-02-07 ENCOUNTER — TELEPHONE (OUTPATIENT)
Dept: FAMILY MEDICINE CLINIC | Age: 35
End: 2025-02-07

## 2025-02-07 NOTE — TELEPHONE ENCOUNTER
Pt calling and is wanting a note for her work stating that pt is chronically cold due to anemia, which requires her to have a space heater. Okay to do letter? Please Advise.

## 2025-02-17 RX ORDER — FERROUS SULFATE 325(65) MG
TABLET ORAL
Qty: 60 TABLET | Refills: 3 | OUTPATIENT
Start: 2025-02-17

## 2025-02-21 ENCOUNTER — HOSPITAL ENCOUNTER (OUTPATIENT)
Dept: VASCULAR LAB | Age: 35
Discharge: HOME | End: 2025-02-23
Payer: COMMERCIAL

## 2025-02-21 DIAGNOSIS — M79.89 LEG SWELLING: ICD-10-CM

## 2025-02-21 LAB
VAS LEFT CFV RFX: 0.6 S
VAS LEFT FV MID RFX: 0.5 S
VAS LEFT GIACOMINI PROX DIAM: 3.9 MM
VAS LEFT GIACOMINI PROX RFX: 0.5 S
VAS LEFT GSV ANKLE DIAM: 1.9 MM
VAS LEFT GSV AT KNEE DIAM: 3.2 MM
VAS LEFT GSV AT KNEE RFX: 1.9 S
VAS LEFT GSV BK DIST DIAM: 3.2 MM
VAS LEFT GSV BK DIST RFX: 1.3 S
VAS LEFT GSV BK MID DIAM: 2.4 MM
VAS LEFT GSV BK MID RFX: 0.5 S
VAS LEFT GSV BK PROX DIAM: 3.1 MM
VAS LEFT GSV BK PROX RFX: 1.5 S
VAS LEFT GSV JUNC DIAM: 9.7 MM
VAS LEFT GSV JUNC RFX: 0.6 S
VAS LEFT GSV THIGH DIST DIAM: 4.1 MM
VAS LEFT GSV THIGH DIST RFX: 3.5 S
VAS LEFT GSV THIGH MID DIAM: 5 MM
VAS LEFT GSV THIGH PROX DIAM: 4.4 MM
VAS LEFT GSV THIGH PROX RFX: 3.3 S
VAS LEFT GSV THIGHT MID RFX: 3.6 S
VAS LEFT PERFORATOR DIAM: 4 MM
VAS LEFT PERFORATOR RFX: 0.6 S
VAS LEFT PFV RFX: 0.3 S
VAS LEFT POP RFX: 0.1 S
VAS LEFT SSV PROX DIAM: 3.2 MM
VAS LEFT SSV PROX RFX: 0.1 S
VAS RIGHT CFV RFX: 1.8 S
VAS RIGHT FV MID RFX: 0.6 S
VAS RIGHT GIACOMINI PROX DIAM: 4.2 MM
VAS RIGHT GIACOMINI PROX RFX: 2.1 S
VAS RIGHT GSV AK RFX: 0.3 S
VAS RIGHT GSV ANKLE DIAM: 3.6 MM
VAS RIGHT GSV AT KNEE DIAM: 3.5 MM
VAS RIGHT GSV BK DIST DIAM: 4.3 MM
VAS RIGHT GSV BK DIST RFX: 0.6 S
VAS RIGHT GSV BK MID DIAM: 3.7 MM
VAS RIGHT GSV BK MID RFX: 1.1 S
VAS RIGHT GSV BK PROX DIAM: 3.6 MM
VAS RIGHT GSV BK PROX RFX: 0.5 S
VAS RIGHT GSV JUNC DIAM: 8.2 MM
VAS RIGHT GSV JUNC RFX: 5.2 S
VAS RIGHT GSV THIGH DIST DIAM: 3.9 MM
VAS RIGHT GSV THIGH DIST RFX: 3.1 S
VAS RIGHT GSV THIGH MID DIAM: 3 MM
VAS RIGHT GSV THIGH MID RFX: 4 S
VAS RIGHT GSV THIGH PROX DIAM: 6.2 MM
VAS RIGHT PERFORATOR DIAM: 4 MM
VAS RIGHT PERFORATOR RFX: 0.7 S
VAS RIGHT PFV RFX: 0.2 S
VAS RIGHT POP RFX: 0.8 S
VAS RIGHT SSV PROX DIAM: 5.4 MM
VAS RIGHT SSV PROX RFX: 2.2 S

## 2025-02-21 PROCEDURE — 93970 EXTREMITY STUDY: CPT

## 2025-02-21 PROCEDURE — 93970 EXTREMITY STUDY: CPT | Performed by: SURGERY

## 2025-02-24 DIAGNOSIS — R09.89 ABNORMAL VASCULAR FLOW: ICD-10-CM

## 2025-02-24 DIAGNOSIS — M79.89 LEG SWELLING: Primary | ICD-10-CM

## 2025-02-26 ENCOUNTER — OFFICE VISIT (OUTPATIENT)
Dept: ENT CLINIC | Age: 35
End: 2025-02-26
Payer: COMMERCIAL

## 2025-02-26 VITALS
HEIGHT: 64 IN | WEIGHT: 248 LBS | BODY MASS INDEX: 42.34 KG/M2 | SYSTOLIC BLOOD PRESSURE: 148 MMHG | HEART RATE: 89 BPM | DIASTOLIC BLOOD PRESSURE: 95 MMHG

## 2025-02-26 DIAGNOSIS — J30.9 ALLERGIC RHINITIS, UNSPECIFIED SEASONALITY, UNSPECIFIED TRIGGER: ICD-10-CM

## 2025-02-26 PROCEDURE — G8417 CALC BMI ABV UP PARAM F/U: HCPCS | Performed by: OTOLARYNGOLOGY

## 2025-02-26 PROCEDURE — 99213 OFFICE O/P EST LOW 20 MIN: CPT | Performed by: OTOLARYNGOLOGY

## 2025-02-26 PROCEDURE — G8427 DOCREV CUR MEDS BY ELIG CLIN: HCPCS | Performed by: OTOLARYNGOLOGY

## 2025-02-26 PROCEDURE — 1036F TOBACCO NON-USER: CPT | Performed by: OTOLARYNGOLOGY

## 2025-02-26 RX ORDER — MONTELUKAST SODIUM 10 MG/1
10 TABLET ORAL NIGHTLY
Qty: 90 TABLET | Refills: 3 | Status: SHIPPED | OUTPATIENT
Start: 2025-02-26

## 2025-02-26 RX ORDER — AZELASTINE 1 MG/ML
1 SPRAY, METERED NASAL 2 TIMES DAILY
Qty: 3 EACH | Refills: 3 | Status: SHIPPED | OUTPATIENT
Start: 2025-02-26

## 2025-02-26 RX ORDER — FLUTICASONE PROPIONATE 50 MCG
1 SPRAY, SUSPENSION (ML) NASAL 2 TIMES DAILY
Qty: 3 EACH | Refills: 3 | Status: SHIPPED | OUTPATIENT
Start: 2025-02-26

## 2025-02-26 RX ORDER — FEXOFENADINE HCL 180 MG/1
180 TABLET ORAL DAILY
Qty: 90 TABLET | Refills: 3 | Status: SHIPPED | OUTPATIENT
Start: 2025-02-26

## 2025-02-26 ASSESSMENT — ENCOUNTER SYMPTOMS
EYE ITCHING: 0
FACIAL SWELLING: 0
APNEA: 0
TROUBLE SWALLOWING: 0
SORE THROAT: 0
SINUS PRESSURE: 0
COUGH: 0
SHORTNESS OF BREATH: 0
VOICE CHANGE: 0

## 2025-02-26 NOTE — PROGRESS NOTES
[facility]  [address], [address]  [place] [address]  P: [phone]       Patient     Johana Alvarado  1990    ChiefComplaint     Chief Complaint   Patient presents with    Follow-up     Seems to be doing better meds seem to be helping       History of Present Illness     Johana is a 34-year-old female here today for 1 month follow-up regarding nasal congestion and chronic drainage.  States she is doing significantly better happy with the improvement where she has.  Using Flonase, Astelin oral antihistamine and Singulair.    Past Medical History     Past Medical History:   Diagnosis Date    ADD (attention deficit disorder)     Anemia     Asthma     last used inhaler yesterday    Autoimmune disorder     suspected Chrons    Chronic GERD 2020    Complication of anesthesia     asthma related-waking up    Depression     past history, now resolved    Diabetes mellitus (HCC)     pre diabetic; currently takes no meds    History of suicidal tendencies     as a teen    Infertility, female     Left carpal tunnel syndrome 2018    Migraine headache     last migraine 3 weeks ago     (normal spontaneous vaginal delivery) 2021    PCOS (polycystic ovarian syndrome)     Postpartum depression     Pre-diabetes     PTSD (post-traumatic stress disorder)     with rare psychotic features in past, symptoms resolved now    Renal cyst, right 2024    Scoliosis     Seizures (HCC)     as teen    Suicidal ideations     Syncope     during pregnancy    Tachycardia     slight arrythmia    Trauma        Past Surgical History     Past Surgical History:   Procedure Laterality Date    CHOLECYSTECTOMY  13    CHOLECYSTECTOMY, LAPAROSCOPIC  2013    ENDOSCOPY, COLON, DIAGNOSTIC      HIATAL HERNIA REPAIR N/A 2022    LAPAROSCOPIC HIATAL HERNIA REPAIR AND LYSIS OF ADHESIONS performed by Lynsey Chou MD at [facility] OR    LAPAROSCOPY Right 2020    DIAGNOSTIC LAPAROSCOPY,  RIGHT

## 2025-03-03 ENCOUNTER — HOSPITAL ENCOUNTER (OUTPATIENT)
Age: 35
Discharge: HOME | End: 2025-03-03
Payer: COMMERCIAL

## 2025-03-03 DIAGNOSIS — Z30.431 IUD CHECK UP: ICD-10-CM

## 2025-03-03 LAB — HCG UR QL: NEGATIVE

## 2025-03-03 PROCEDURE — 84703 CHORIONIC GONADOTROPIN ASSAY: CPT

## 2025-03-07 ENCOUNTER — HOSPITAL ENCOUNTER (EMERGENCY)
Age: 35
Discharge: HOME | End: 2025-03-07
Attending: EMERGENCY MEDICINE
Payer: COMMERCIAL

## 2025-03-07 VITALS
HEIGHT: 64 IN | OXYGEN SATURATION: 99 % | RESPIRATION RATE: 20 BRPM | DIASTOLIC BLOOD PRESSURE: 69 MMHG | BODY MASS INDEX: 40.8 KG/M2 | TEMPERATURE: 98.2 F | HEART RATE: 98 BPM | WEIGHT: 239 LBS | SYSTOLIC BLOOD PRESSURE: 138 MMHG

## 2025-03-07 DIAGNOSIS — J20.9 ACUTE BRONCHITIS, UNSPECIFIED ORGANISM: Primary | ICD-10-CM

## 2025-03-07 LAB
FLUAV RNA UPPER RESP QL NAA+PROBE: NEGATIVE
FLUBV AG NPH QL: NEGATIVE
S PYO AG THROAT QL: NEGATIVE
SARS-COV-2 RDRP RESP QL NAA+PROBE: NOT DETECTED

## 2025-03-07 PROCEDURE — 87880 STREP A ASSAY W/OPTIC: CPT

## 2025-03-07 PROCEDURE — 87804 INFLUENZA ASSAY W/OPTIC: CPT

## 2025-03-07 PROCEDURE — 6370000000 HC RX 637 (ALT 250 FOR IP): Performed by: EMERGENCY MEDICINE

## 2025-03-07 PROCEDURE — 99283 EMERGENCY DEPT VISIT LOW MDM: CPT

## 2025-03-07 PROCEDURE — 87635 SARS-COV-2 COVID-19 AMP PRB: CPT

## 2025-03-07 RX ORDER — PREDNISONE 20 MG/1
40 TABLET ORAL ONCE
Status: COMPLETED | OUTPATIENT
Start: 2025-03-07 | End: 2025-03-07

## 2025-03-07 RX ORDER — PREDNISONE 10 MG/1
40 TABLET ORAL DAILY
Qty: 20 TABLET | Refills: 0 | Status: SHIPPED | OUTPATIENT
Start: 2025-03-07 | End: 2025-03-12

## 2025-03-07 RX ADMIN — PREDNISONE 40 MG: 20 TABLET ORAL at 12:16

## 2025-03-07 ASSESSMENT — PAIN SCALES - GENERAL: PAINLEVEL_OUTOF10: 6

## 2025-03-07 ASSESSMENT — PAIN DESCRIPTION - FREQUENCY: FREQUENCY: INTERMITTENT

## 2025-03-07 ASSESSMENT — PAIN DESCRIPTION - PAIN TYPE: TYPE: ACUTE PAIN

## 2025-03-07 ASSESSMENT — PAIN DESCRIPTION - ONSET: ONSET: GRADUAL

## 2025-03-07 ASSESSMENT — PAIN - FUNCTIONAL ASSESSMENT: PAIN_FUNCTIONAL_ASSESSMENT: 0-10

## 2025-03-07 ASSESSMENT — PAIN DESCRIPTION - DESCRIPTORS: DESCRIPTORS: SORE

## 2025-03-07 ASSESSMENT — PAIN DESCRIPTION - LOCATION: LOCATION: CHEST

## 2025-03-07 NOTE — ED PROVIDER NOTES
EMERGENCY DEPARTMENT ENCOUNTER     [facility] EMERGENCY DEPARTMENT     Pt Name: Johana Alvarado   MRN: [id number]   Birthdate 1990   Date of evaluation: 3/7/2025   Provider: NANCY PACE MD   PCP: Nancy Mosquera MD   Note Started: 12:31 PM EST 3/7/25     CHIEF COMPLAINT     Chief Complaint   Patient presents with    Cough     Pt stated onset of sore throat and cough with sob x 3 days. Pt stated that she has asthma and her treatments aren't helping as much. Pt son is also home with croup.         HISTORY OF PRESENT ILLNESS:  History from : Patient   Limitations to history : None     Johana Alvarado is a 34 y.o. female who presents with cough rhinorrhea and wheezing.  The patient has a history of asthma use her nebulizer prior to arrival.  She states that she has had cough which is productive of yellow and green sputum for the last few days along with a sore throat, its made her feel short of breath at times.  Her son has croup.  She does not believe she has had a fever.  She has had no vomiting.    Nursing Notes were all reviewed and agreed with or any disagreements were addressed in the HPI.     ROS: Positives and Pertinent negatives as per HPI.    PAST MEDICAL HISTORY     Past medical history:  has a past medical history of ADD (attention deficit disorder), Anemia, Asthma, Autoimmune disorder, Chronic GERD (2020), Complication of anesthesia, Depression, Diabetes mellitus (HCC), History of suicidal tendencies, Infertility, female, Left carpal tunnel syndrome (2018), Migraine headache,  (normal spontaneous vaginal delivery) (2021), PCOS (polycystic ovarian syndrome), Postpartum depression, Pre-diabetes, PTSD (post-traumatic stress disorder), Renal cyst, right (2024), Scoliosis, Seizures (HCC), Suicidal ideations, Syncope, Tachycardia, and Trauma.    Past surgical history:  has a past surgical history that includes Cholecystectomy, laparoscopic

## 2025-03-10 ENCOUNTER — OFFICE VISIT (OUTPATIENT)
Dept: OBGYN CLINIC | Age: 35
End: 2025-03-10
Payer: COMMERCIAL

## 2025-03-10 VITALS — BODY MASS INDEX: 41.71 KG/M2 | WEIGHT: 243 LBS

## 2025-03-10 DIAGNOSIS — Z30.430 ENCOUNTER FOR IUD INSERTION: Primary | ICD-10-CM

## 2025-03-10 PROCEDURE — 81025 URINE PREGNANCY TEST: CPT | Performed by: OBSTETRICS & GYNECOLOGY

## 2025-03-10 PROCEDURE — 58300 INSERT INTRAUTERINE DEVICE: CPT | Performed by: OBSTETRICS & GYNECOLOGY

## 2025-03-10 NOTE — PROGRESS NOTES
IUD Insertion Procedure Note    Pre-operative Diagnosis: Desires Mirena IUD    Post-operative Diagnosis: Same    Procedure Details   Urine pregnancy test was done and result was negative. The risks (including infection, bleeding, pain, and uterine perforation) and benefits of the procedure were explained to the patient and written informed consent was obtained. Speculum was then placed in the vagina and a single tooth tenaculum was applied to the anterior lip of the cervix.  The uterus was sounded to a depth of 9 cm.  The IUD was then placed according to  instructions under direct US guidance, applicator removed and strings trimmed to 3cm.  Noted to be at uterine fundus on US immediately following insertion.    The single tooth tenaculum was removed. Excellent hemostasis was assured. The speculum was then removed.      The patient tolerated the procedure well. She was given return precautions and verbally stated understanding.     IUD Information:  Type of IUD: Mirena IUD  Lot #[id number]  Expiration: Dec 2026  Condition: Stable     Complications: None    Plan:  The patient was advised to call for any fever or for prolonged or severe pain or bleeding. She was advised to use OTC analgesics as needed for mild to moderate pain.     Layne Ruano MD

## 2025-03-17 ENCOUNTER — OFFICE VISIT (OUTPATIENT)
Dept: SURGERY | Age: 35
End: 2025-03-17
Payer: COMMERCIAL

## 2025-03-17 VITALS
HEIGHT: 64 IN | HEART RATE: 80 BPM | OXYGEN SATURATION: 98 % | DIASTOLIC BLOOD PRESSURE: 76 MMHG | WEIGHT: 241 LBS | BODY MASS INDEX: 41.15 KG/M2 | SYSTOLIC BLOOD PRESSURE: 106 MMHG | TEMPERATURE: 97.9 F

## 2025-03-17 DIAGNOSIS — M79.3 PANNICULITIS: Primary | ICD-10-CM

## 2025-03-17 PROCEDURE — G8427 DOCREV CUR MEDS BY ELIG CLIN: HCPCS

## 2025-03-17 PROCEDURE — G8417 CALC BMI ABV UP PARAM F/U: HCPCS

## 2025-03-17 PROCEDURE — 99204 OFFICE O/P NEW MOD 45 MIN: CPT

## 2025-03-17 PROCEDURE — 1036F TOBACCO NON-USER: CPT

## 2025-03-17 NOTE — PROGRESS NOTES
[facility]    Consultation requested by: Lynsey Chou MD.     CC: Body contouring    HPI: 34 y.o. female with a PMHx as delineated below who presents in consultation for body contouring. Patient states since losing weight she has hanging skin near her vagina that causes rashes and she has tried prescribed nystatin with some relief.     Bariatric surgery: Yes / date:  (Type: sleeve gastrectomy)    Max weight (lbs): 301  Lowest weight (lbs): 198  Current (lbs): 241  Weight change in the past 3 months: No  Max BMI: 51.7  Current BMI: Body mass index is 41.37 kg/m².    History of DVT/PE: No  Excess skin cover genitals: Yes  Previous body contouring procedures: No   Smoking: No      Last Mammogram: n/a    Current bra size: 38 G  Desired bra size: \"small as possible\"   Pregnancies/miscarriages:   Breast feeding: no future plans    Breast Symptoms:    Macromastia Symptoms:  Upper back pain: Yes      Bra strap grooves: Yes      Wears supportive bras (>1 yr): Yes      Tried conservative measures (PT, MDs,etc): Yes, OB, Physical therapy for back pain last year associated with macromastia       Intertrigo: Yes      Head/neck pain: Yes      Headaches: Yes      Paresthesias of hands/fingers: No    Past Medical History:   Diagnosis Date    ADD (attention deficit disorder)     Anemia     Asthma     last used inhaler yesterday    Autoimmune disorder     suspected Chrons    Chronic GERD 2020    Complication of anesthesia     asthma related-waking up    Depression     past history, now resolved    Diabetes mellitus (HCC)     pre diabetic; currently takes no meds    History of suicidal tendencies     as a teen    Infertility, female     Left carpal tunnel syndrome 2018    Migraine headache     last migraine 3 weeks ago     (normal spontaneous vaginal delivery) 2021    PCOS (polycystic ovarian syndrome)     Postpartum depression     Pre-diabetes     PTSD (post-traumatic stress

## 2025-03-26 DIAGNOSIS — F90.2 ATTENTION DEFICIT HYPERACTIVITY DISORDER (ADHD), COMBINED TYPE: ICD-10-CM

## 2025-03-26 RX ORDER — DEXTROAMPHETAMINE SACCHARATE, AMPHETAMINE ASPARTATE MONOHYDRATE, DEXTROAMPHETAMINE SULFATE AND AMPHETAMINE SULFATE 7.5; 7.5; 7.5; 7.5 MG/1; MG/1; MG/1; MG/1
30 CAPSULE, EXTENDED RELEASE ORAL DAILY
Qty: 30 CAPSULE | Refills: 0 | Status: SHIPPED | OUTPATIENT
Start: 2025-03-26 | End: 2025-04-25

## 2025-04-08 ENCOUNTER — OFFICE VISIT (OUTPATIENT)
Dept: FAMILY MEDICINE CLINIC | Age: 35
End: 2025-04-08
Payer: COMMERCIAL

## 2025-04-08 VITALS
BODY MASS INDEX: 41.71 KG/M2 | DIASTOLIC BLOOD PRESSURE: 70 MMHG | WEIGHT: 243 LBS | TEMPERATURE: 98 F | OXYGEN SATURATION: 98 % | SYSTOLIC BLOOD PRESSURE: 106 MMHG | HEART RATE: 90 BPM

## 2025-04-08 DIAGNOSIS — F33.1 MODERATE EPISODE OF RECURRENT MAJOR DEPRESSIVE DISORDER (HCC): ICD-10-CM

## 2025-04-08 DIAGNOSIS — K76.0 FATTY LIVER: Primary | ICD-10-CM

## 2025-04-08 PROBLEM — Z59.9 FINANCIAL DIFFICULTIES: Status: RESOLVED | Noted: 2023-10-02 | Resolved: 2025-04-08

## 2025-04-08 PROBLEM — R73.09 BLOOD GLUCOSE ABNORMAL: Status: RESOLVED | Noted: 2023-10-02 | Resolved: 2025-04-08

## 2025-04-08 PROBLEM — Z72.820 POOR SLEEP: Status: RESOLVED | Noted: 2023-10-02 | Resolved: 2025-04-08

## 2025-04-08 PROBLEM — O46.8X1 SUBCHORIONIC HEMATOMA IN FIRST TRIMESTER: Status: RESOLVED | Noted: 2023-04-04 | Resolved: 2025-04-08

## 2025-04-08 PROBLEM — Z86.32 HISTORY OF GESTATIONAL DIABETES: Status: RESOLVED | Noted: 2023-12-15 | Resolved: 2025-04-08

## 2025-04-08 PROBLEM — O41.8X10 SUBCHORIONIC HEMATOMA IN FIRST TRIMESTER: Status: RESOLVED | Noted: 2023-04-04 | Resolved: 2025-04-08

## 2025-04-08 PROCEDURE — 99213 OFFICE O/P EST LOW 20 MIN: CPT | Performed by: FAMILY MEDICINE

## 2025-04-08 PROCEDURE — G8427 DOCREV CUR MEDS BY ELIG CLIN: HCPCS | Performed by: FAMILY MEDICINE

## 2025-04-08 PROCEDURE — 1036F TOBACCO NON-USER: CPT | Performed by: FAMILY MEDICINE

## 2025-04-08 PROCEDURE — G8417 CALC BMI ABV UP PARAM F/U: HCPCS | Performed by: FAMILY MEDICINE

## 2025-04-08 RX ORDER — ESCITALOPRAM OXALATE 10 MG/1
10 TABLET ORAL DAILY
Qty: 30 TABLET | Refills: 5 | Status: SHIPPED | OUTPATIENT
Start: 2025-04-08

## 2025-04-08 NOTE — PROGRESS NOTES
She presents today for follow-up of MRI that you see gastroenterology did.  They were further working up her iron deficiency and GI issues..  She had normal EGD and colonoscopy with normal pathology.  They did find a hiatal hernia and some internal hemorrhoids otherwise unremarkable.  They did MRI enterography which was normal except for fatty liver.  They wanted her to drive downtown but she cannot get down there for appointment to find out results.  She states since they put in her new IUD she has had some recurrence of her mild depression.  In the past she has used antidepressants.  She had a couple seizures as a teenager but she states she has not had any problems with the medicine and she continues to take her ADD medicine without any issues.  Tests and documents reviewed today: MRI report reviewed as well as EGD and colonoscopy done through      Objective:   /70   Pulse 90   Temp 98 °F (36.7 °C) (Oral)   Wt 110.2 kg (243 lb)   SpO2 98%   BMI 41.71 kg/m²   BP Readings from Last 3 Encounters:   04/08/25 106/70   03/17/25 106/76   03/07/25 138/69     Physical Exam  Vitals reviewed.   Constitutional:       Appearance: She is well-developed.   HENT:      Head: Normocephalic.   Neck:      Thyroid: No thyromegaly.   Cardiovascular:      Rate and Rhythm: Normal rate and regular rhythm.      Heart sounds: Normal heart sounds. No murmur heard.  Pulmonary:      Effort: No respiratory distress.      Breath sounds: Normal breath sounds. No wheezing or rales.   Abdominal:      Palpations: Abdomen is soft.      Tenderness: There is no abdominal tenderness.   Musculoskeletal:      Cervical back: Neck supple.   Lymphadenopathy:      Cervical: No cervical adenopathy.   Neurological:      Mental Status: She is alert.   Psychiatric:         Attention and Perception: Attention normal.         Mood and Affect: Mood is depressed.         Speech: Speech normal.         Behavior: Behavior normal.         Thought Content:

## 2025-04-08 NOTE — PATIENT INSTRUCTIONS
Please read the healthy family handout that you were given and share it with your family.       Please compare this printed medication list with your medications at home to be sure they are the same.  If you have any medications that are different please contact us immediately at [phone].     Also review your allergies that we have listed, these may also include medications that you have not been able to tolerate, make sure everything listed is correct. If you have any allergies that are different please contact us immediately at [phone].     You may receive a survey in the mail or by email asking about your experience during your visit today. Please complete and return to us so we know how we are serving you.

## 2025-04-09 ENCOUNTER — TELEPHONE (OUTPATIENT)
Dept: OBGYN CLINIC | Age: 35
End: 2025-04-09

## 2025-04-09 DIAGNOSIS — Z30.431 IUD CHECK UP: Primary | ICD-10-CM

## 2025-04-09 NOTE — TELEPHONE ENCOUNTER
Pt called to cancel appointment for IUD Check. She wanted to check if it is necessary to reschedule. Pt given pain and bleeding precautions. She has work and unable to come in. Please advise

## 2025-04-10 NOTE — TELEPHONE ENCOUNTER
If she would like to just do an outpatient US when it's convenient for her that is fine, otherwise OK with nomral bleeding/pain precautions. Thanks.

## 2025-04-28 DIAGNOSIS — F90.2 ATTENTION DEFICIT HYPERACTIVITY DISORDER (ADHD), COMBINED TYPE: ICD-10-CM

## 2025-04-28 RX ORDER — DEXTROAMPHETAMINE SACCHARATE, AMPHETAMINE ASPARTATE MONOHYDRATE, DEXTROAMPHETAMINE SULFATE AND AMPHETAMINE SULFATE 7.5; 7.5; 7.5; 7.5 MG/1; MG/1; MG/1; MG/1
30 CAPSULE, EXTENDED RELEASE ORAL DAILY
Qty: 30 CAPSULE | Refills: 0 | Status: SHIPPED | OUTPATIENT
Start: 2025-04-28 | End: 2025-05-28

## 2025-04-28 NOTE — TELEPHONE ENCOUNTER
Date of last refill of this med was 3/26/25, # of pills given 30 and # of refills given 0.  Their next appointment is 8/26/25, the last date patient was seen was 4/8/25.  Does patient have medication agreement on file? Yes  Has drug screen been done in last 12 months if needed? no

## 2025-05-02 ENCOUNTER — TELEPHONE (OUTPATIENT)
Dept: VASCULAR SURGERY | Age: 35
End: 2025-05-02

## 2025-05-02 NOTE — TELEPHONE ENCOUNTER
-Pt requesting to Reschedule [facility] appt for varicose veins in both legs with Dr. Talamantes.

## 2025-05-05 ENCOUNTER — HOSPITAL ENCOUNTER (EMERGENCY)
Age: 35
Discharge: HOME | End: 2025-05-05
Attending: STUDENT IN AN ORGANIZED HEALTH CARE EDUCATION/TRAINING PROGRAM
Payer: COMMERCIAL

## 2025-05-05 ENCOUNTER — TELEPHONE (OUTPATIENT)
Dept: FAMILY MEDICINE CLINIC | Age: 35
End: 2025-05-05

## 2025-05-05 ENCOUNTER — APPOINTMENT (OUTPATIENT)
Dept: GENERAL RADIOLOGY | Age: 35
End: 2025-05-05
Payer: COMMERCIAL

## 2025-05-05 VITALS
TEMPERATURE: 98.7 F | DIASTOLIC BLOOD PRESSURE: 87 MMHG | HEART RATE: 71 BPM | RESPIRATION RATE: 18 BRPM | OXYGEN SATURATION: 100 % | WEIGHT: 239.7 LBS | HEIGHT: 64 IN | SYSTOLIC BLOOD PRESSURE: 127 MMHG | BODY MASS INDEX: 40.92 KG/M2

## 2025-05-05 DIAGNOSIS — J45.901 EXACERBATION OF ASTHMA, UNSPECIFIED ASTHMA SEVERITY, UNSPECIFIED WHETHER PERSISTENT: Primary | ICD-10-CM

## 2025-05-05 LAB
EKG ATRIAL RATE: 64 BPM
EKG DIAGNOSIS: NORMAL
EKG P AXIS: 67 DEGREES
EKG P-R INTERVAL: 158 MS
EKG Q-T INTERVAL: 380 MS
EKG QRS DURATION: 90 MS
EKG QTC CALCULATION (BAZETT): 392 MS
EKG R AXIS: 63 DEGREES
EKG T AXIS: 40 DEGREES
EKG VENTRICULAR RATE: 64 BPM

## 2025-05-05 PROCEDURE — 93005 ELECTROCARDIOGRAM TRACING: CPT | Performed by: STUDENT IN AN ORGANIZED HEALTH CARE EDUCATION/TRAINING PROGRAM

## 2025-05-05 PROCEDURE — 71045 X-RAY EXAM CHEST 1 VIEW: CPT

## 2025-05-05 PROCEDURE — 99284 EMERGENCY DEPT VISIT MOD MDM: CPT

## 2025-05-05 PROCEDURE — 6370000000 HC RX 637 (ALT 250 FOR IP): Performed by: STUDENT IN AN ORGANIZED HEALTH CARE EDUCATION/TRAINING PROGRAM

## 2025-05-05 PROCEDURE — 93010 ELECTROCARDIOGRAM REPORT: CPT | Performed by: INTERNAL MEDICINE

## 2025-05-05 RX ORDER — PREDNISONE 20 MG/1
60 TABLET ORAL DAILY
Qty: 12 TABLET | Refills: 0 | Status: SHIPPED | OUTPATIENT
Start: 2025-05-06 | End: 2025-05-10

## 2025-05-05 RX ORDER — PREDNISONE 20 MG/1
60 TABLET ORAL ONCE
Status: COMPLETED | OUTPATIENT
Start: 2025-05-05 | End: 2025-05-05

## 2025-05-05 RX ADMIN — PREDNISONE 60 MG: 20 TABLET ORAL at 12:25

## 2025-05-05 ASSESSMENT — PAIN DESCRIPTION - DESCRIPTORS: DESCRIPTORS: HEAVINESS

## 2025-05-05 ASSESSMENT — PAIN - FUNCTIONAL ASSESSMENT: PAIN_FUNCTIONAL_ASSESSMENT: 0-10

## 2025-05-05 ASSESSMENT — PAIN SCALES - GENERAL: PAINLEVEL_OUTOF10: 2

## 2025-05-05 ASSESSMENT — PAIN DESCRIPTION - LOCATION: LOCATION: CHEST

## 2025-05-05 NOTE — DISCHARGE INSTRUCTIONS
Take steroids as directed.  Use inhaler and nebulizer as needed.  If your symptoms worsen or you develop new, concerning symptoms, please return to the emergency department.  Please follow-up with your PCP this week.

## 2025-05-05 NOTE — ED PROVIDER NOTES
History of Present Illness       Johana Alvarado is a 34 y.o. female with a   Past Medical History:   Diagnosis Date    ADD (attention deficit disorder)     Anemia     Asthma     last used inhaler yesterday    Autoimmune disorder     suspected Chrons    Chronic GERD 2020    Complication of anesthesia     asthma related-waking up    Depression     past history, now resolved    Diabetes mellitus (HCC)     pre diabetic; currently takes no meds    History of gestational diabetes 12/15/2023    History of suicidal tendencies     as a teen    Infertility, female     Left carpal tunnel syndrome 2018    Migraine headache     last migraine 3 weeks ago     (normal spontaneous vaginal delivery) 2021    PCOS (polycystic ovarian syndrome)     Postpartum depression     Pre-diabetes     PTSD (post-traumatic stress disorder)     with rare psychotic features in past, symptoms resolved now    Renal cyst, right 2024    Scoliosis     Seizures (HCC)     as teen    Subchorionic hematoma in first trimester (RESOLVED) 2023    Suicidal ideations     Syncope     during pregnancy    Tachycardia     slight arrythmia    Trauma     who presents to the emergency department today with cough.  Has been coughing for the past several days.  Feels that is worsening of her asthma.  Patient has had albuterol at home with some improvement.  Patient also had an episode of palpitations earlier this morning but since has resolved.  Denies chest pain, fever, nausea, vomiting, abdominal pain, urine changes, bowel changes.  Use nebulizer shortly prior to arrival.      PMH     Family History   Problem Relation Age of Onset    Mental Illness Mother         Bipolar and anxiety d/o \"drunk and a pillhead\"    Depression Mother     Heart Disease Mother     High Blood Pressure Mother     High Cholesterol Mother     Substance Abuse Mother     Mental Illness Father         \"Rapist\" \"murderer\"    Diabetes Father     Substance Abuse

## 2025-05-05 NOTE — TELEPHONE ENCOUNTER
Patient calling for advice on what she should do, states she has had increase in asthma over the weekend and had to use emergency inhaler. States her watch is also stating she is in cardiac overload, but is not sure if this is a technical problem with her watch. Wants to know if she should go to ED or make appointment in office. Please advise.

## 2025-05-05 NOTE — TELEPHONE ENCOUNTER
I spoke to patient she is very sob, having a hard time talking and breathing. She has been this bad all weekend. I advised patient to seek care at the ER.

## 2025-05-07 ENCOUNTER — OFFICE VISIT (OUTPATIENT)
Dept: FAMILY MEDICINE CLINIC | Age: 35
End: 2025-05-07
Payer: COMMERCIAL

## 2025-05-07 VITALS
SYSTOLIC BLOOD PRESSURE: 106 MMHG | HEART RATE: 107 BPM | WEIGHT: 243.4 LBS | DIASTOLIC BLOOD PRESSURE: 73 MMHG | HEIGHT: 64 IN | BODY MASS INDEX: 41.55 KG/M2 | RESPIRATION RATE: 18 BRPM | OXYGEN SATURATION: 99 %

## 2025-05-07 DIAGNOSIS — J45.901 MODERATE ASTHMA WITH EXACERBATION, UNSPECIFIED WHETHER PERSISTENT: Primary | ICD-10-CM

## 2025-05-07 PROCEDURE — 1036F TOBACCO NON-USER: CPT

## 2025-05-07 PROCEDURE — G8417 CALC BMI ABV UP PARAM F/U: HCPCS

## 2025-05-07 PROCEDURE — G8427 DOCREV CUR MEDS BY ELIG CLIN: HCPCS

## 2025-05-07 PROCEDURE — 99214 OFFICE O/P EST MOD 30 MIN: CPT

## 2025-05-07 RX ORDER — M-VIT,TX,IRON,MINS/CALC/FOLIC 27MG-0.4MG
1 TABLET ORAL DAILY
COMMUNITY

## 2025-05-07 RX ORDER — NEBULIZER ACCESSORIES
1 KIT MISCELLANEOUS DAILY PRN
Qty: 1 KIT | Refills: 0 | Status: SHIPPED | OUTPATIENT
Start: 2025-05-07 | End: 2025-05-09 | Stop reason: SDUPTHER

## 2025-05-07 ASSESSMENT — ENCOUNTER SYMPTOMS
ABDOMINAL PAIN: 0
NAUSEA: 0
SINUS PAIN: 0
RHINORRHEA: 0
COUGH: 1
VOMITING: 0
SHORTNESS OF BREATH: 1
EYE DISCHARGE: 0
BLOOD IN STOOL: 0
SORE THROAT: 0
ALLERGIC/IMMUNOLOGIC NEGATIVE: 1

## 2025-05-07 NOTE — PROGRESS NOTES
nightly 90 tablet 3    prazosin (MINIPRESS) 2 MG capsule Take 1 capsule by mouth nightly (Dose increase) 90 capsule 3    metroNIDAZOLE (METROGEL) 0.75 % gel Apply topically 2 times daily. 45 g 0    ondansetron (ZOFRAN) 4 MG tablet Take 1 tablet by mouth as needed for Nausea or Vomiting      butalbital-acetaminophen-caffeine (FIORICET, ESGIC) -40 MG per tablet Take 1 tablet by mouth every 4 hours as needed for Headaches      albuterol sulfate HFA (PROVENTIL;VENTOLIN;PROAIR) 108 (90 Base) MCG/ACT inhaler INHALE 2 PUFFS BY MOUTH EVERY 6 HOURS AS NEEDED FOR WHEEZE 18 g 5    acetaminophen (TYLENOL) 325 MG tablet Take 2 tablets by mouth every 6 hours as needed for Pain      NONFORMULARY States she receives iron infusions every 3-6  months      budesonide-formoterol (SYMBICORT) 160-4.5 MCG/ACT AERO Inhale 1 puff into the lungs 2 times daily 1 each 5    albuterol (PROVENTIL) (2.5 MG/3ML) 0.083% nebulizer solution Take 3 mLs by nebulization every 4 hours as needed for Wheezing or Shortness of Breath 120 each 3     No current facility-administered medications on file prior to visit.      Past Medical History:   Diagnosis Date    ADD (attention deficit disorder)     Anemia     Asthma     last used inhaler yesterday    Autoimmune disorder     suspected Chrons    Chronic GERD 2020    Complication of anesthesia     asthma related-waking up    Depression     past history, now resolved    Diabetes mellitus (HCC)     pre diabetic; currently takes no meds    History of gestational diabetes 12/15/2023    History of suicidal tendencies     as a teen    Infertility, female     Left carpal tunnel syndrome 2018    Migraine headache     last migraine 3 weeks ago     (normal spontaneous vaginal delivery) 2021    PCOS (polycystic ovarian syndrome)     Postpartum depression     Pre-diabetes     PTSD (post-traumatic stress disorder)     with rare psychotic features in past, symptoms resolved now    Renal cyst, right

## 2025-05-09 DIAGNOSIS — J45.901 MODERATE ASTHMA WITH EXACERBATION, UNSPECIFIED WHETHER PERSISTENT: ICD-10-CM

## 2025-05-09 RX ORDER — NEBULIZER ACCESSORIES
1 KIT MISCELLANEOUS DAILY PRN
Qty: 1 KIT | Refills: 0 | Status: SHIPPED | OUTPATIENT
Start: 2025-05-09

## 2025-05-11 ENCOUNTER — HOSPITAL ENCOUNTER (EMERGENCY)
Age: 35
Discharge: HOME | End: 2025-05-11
Attending: EMERGENCY MEDICINE
Payer: COMMERCIAL

## 2025-05-11 VITALS
BODY MASS INDEX: 41.48 KG/M2 | RESPIRATION RATE: 16 BRPM | HEART RATE: 86 BPM | HEIGHT: 64 IN | DIASTOLIC BLOOD PRESSURE: 81 MMHG | OXYGEN SATURATION: 100 % | SYSTOLIC BLOOD PRESSURE: 141 MMHG | TEMPERATURE: 98.3 F | WEIGHT: 243 LBS

## 2025-05-11 DIAGNOSIS — H65.03 BILATERAL ACUTE SEROUS OTITIS MEDIA, RECURRENCE NOT SPECIFIED: Primary | ICD-10-CM

## 2025-05-11 PROCEDURE — 99283 EMERGENCY DEPT VISIT LOW MDM: CPT

## 2025-05-11 PROCEDURE — 6370000000 HC RX 637 (ALT 250 FOR IP): Performed by: EMERGENCY MEDICINE

## 2025-05-11 RX ORDER — CEFDINIR 300 MG/1
300 CAPSULE ORAL EVERY 12 HOURS SCHEDULED
Status: DISCONTINUED | OUTPATIENT
Start: 2025-05-11 | End: 2025-05-11 | Stop reason: HOSPADM

## 2025-05-11 RX ORDER — CEFDINIR 300 MG/1
300 CAPSULE ORAL EVERY 12 HOURS SCHEDULED
Status: DISCONTINUED | OUTPATIENT
Start: 2025-05-11 | End: 2025-05-11

## 2025-05-11 RX ORDER — CEFDINIR 300 MG/1
300 CAPSULE ORAL 2 TIMES DAILY
Qty: 20 CAPSULE | Refills: 0 | Status: SHIPPED | OUTPATIENT
Start: 2025-05-11 | End: 2025-05-16 | Stop reason: SINTOL

## 2025-05-11 RX ADMIN — CEFDINIR 300 MG: 300 CAPSULE ORAL at 19:35

## 2025-05-11 NOTE — ED PROVIDER NOTES
[facility] EMERGENCY DEPARTMENT     Pt Name: Johana Alvarado   MRN: [id number]   Birthdate 1990   Date of evaluation: 5/11/2025   Provider: Zak Munoz MD   PCP: Favian Mosquera MD   Note Started: 6:58 PM EDT 5/11/25     TRIAGE CHIEF COMPLAINT:  Chief Complaint   Patient presents with    Ear Pain     Pt states bilat ear pain x 3 days and states that her throat has been red and had some white spots on it.     HISTORY OF PRESENT ILLNESS:  Johana Alvarado is a 34 y.o. female who presents to the emergency department with a several day history of increased pressure pain in her ears bilaterally.  She says she has a history of asthma and recently had an upper respiratory illness.  She was seen and started on a prednisone burst but was not on antibiotics.  She says she takes multiple allergy medications including Flonase Benadryl Allegra and has been compliant with these recently.  However, she has had gradual increased pressure in her ears bilaterally.  She has had no drainage no fever.  She says her wheezing and asthma symptoms generally have improved since being on the prednisone but since her ears are worse she came to the ED for evaluation.    REVIEW OF SYSTEMS:  See HPI for further details. Review of systems otherwise negative.    PROBLEM LIST:  Patient Active Problem List   Diagnosis    PTSD (post-traumatic stress disorder)    Essential and other specified forms of tremor    PCOS (polycystic ovarian syndrome)    Midline thoracic back pain    Allergic rhinitis    Morbid obesity with BMI of 40.0-44.9, adult (HCC)    Left carpal tunnel syndrome    Chronic pain of both knees    Current moderate episode of major depressive disorder without prior episode (HCC)    Attention deficit hyperactivity disorder (ADHD), combined type    Bilateral leg edema    Vitamin D deficiency    Mixed hyperlipidemia    Iron deficiency anemia    Prediabetes    Vitamin B1 deficiency    S/P unilateral salpingo-oophorectomy

## 2025-05-16 DIAGNOSIS — H66.93 BILATERAL OTITIS MEDIA, UNSPECIFIED OTITIS MEDIA TYPE: Primary | ICD-10-CM

## 2025-05-16 RX ORDER — CLINDAMYCIN HYDROCHLORIDE 300 MG/1
300 CAPSULE ORAL 3 TIMES DAILY
Qty: 21 CAPSULE | Refills: 0 | Status: SHIPPED | OUTPATIENT
Start: 2025-05-16 | End: 2025-05-23

## 2025-05-29 ENCOUNTER — TELEPHONE (OUTPATIENT)
Dept: VASCULAR SURGERY | Age: 35
End: 2025-05-29

## 2025-05-29 ENCOUNTER — OFFICE VISIT (OUTPATIENT)
Dept: VASCULAR SURGERY | Age: 35
End: 2025-05-29
Payer: COMMERCIAL

## 2025-05-29 VITALS
BODY MASS INDEX: 41.64 KG/M2 | HEART RATE: 68 BPM | DIASTOLIC BLOOD PRESSURE: 64 MMHG | OXYGEN SATURATION: 96 % | WEIGHT: 242.6 LBS | SYSTOLIC BLOOD PRESSURE: 110 MMHG

## 2025-05-29 DIAGNOSIS — I83.893 SYMPTOMATIC VARICOSE VEINS OF BOTH LOWER EXTREMITIES: Primary | ICD-10-CM

## 2025-05-29 PROCEDURE — G8417 CALC BMI ABV UP PARAM F/U: HCPCS | Performed by: SURGERY

## 2025-05-29 PROCEDURE — 99204 OFFICE O/P NEW MOD 45 MIN: CPT | Performed by: SURGERY

## 2025-05-29 PROCEDURE — 1036F TOBACCO NON-USER: CPT | Performed by: SURGERY

## 2025-05-29 PROCEDURE — G8427 DOCREV CUR MEDS BY ELIG CLIN: HCPCS | Performed by: SURGERY

## 2025-05-29 ASSESSMENT — ENCOUNTER SYMPTOMS
SHORTNESS OF BREATH: 1
ALLERGIC/IMMUNOLOGIC NEGATIVE: 1
EYES NEGATIVE: 1
CHEST TIGHTNESS: 1
VOMITING: 1
WHEEZING: 1

## 2025-05-29 NOTE — TELEPHONE ENCOUNTER
Please submit PA for (2 separate procedures) - 1) L GSV RFA + stab phlebectomies; 2) R GSV RFA + R LSV/Giacomini RFA +   stab phlebectomies. Surgery form scanned into media.

## 2025-05-29 NOTE — PROGRESS NOTES
Johana Alvarado   34 y.o.  female referred by Favian Mosquera MD for evaluation of large varicose veins bilaterally associated with achy discomfort, fatigue and heaviness as well as a feeling of numbness.  C/O leg swelling worsening through the course of the day. The pain is worse in the areas of the varicosities throughout the thighs and calfs bilaterally with the left leg being more symptomatic than the right.  Symptoms are worse with standing and improved with medication, compression stockings and walking.  No history of documented DVT or SVT, ulceration, dermatitis or bleeding.  No previous venous interventions. + FH    Past Medical History:   Diagnosis Date    ADD (attention deficit disorder)     Anemia     Asthma     last used inhaler yesterday    Autoimmune disorder     suspected Chrons    Chronic GERD 2020    Complication of anesthesia     asthma related-waking up    Depression     past history, now resolved    Diabetes mellitus (HCC)     pre diabetic; currently takes no meds    History of gestational diabetes 12/15/2023    History of suicidal tendencies     as a teen    Infertility, female     Left carpal tunnel syndrome 2018    Migraine headache     last migraine 3 weeks ago     (normal spontaneous vaginal delivery) 2021    PCOS (polycystic ovarian syndrome)     Postpartum depression     Pre-diabetes     PTSD (post-traumatic stress disorder)     with rare psychotic features in past, symptoms resolved now    Renal cyst, right 2024    Scoliosis     Seizures (HCC)     as teen    Subchorionic hematoma in first trimester (RESOLVED) 2023    Suicidal ideations     Syncope     during pregnancy    Tachycardia     slight arrythmia    Trauma      Past Surgical History:   Procedure Laterality Date    CHOLECYSTECTOMY  13    CHOLECYSTECTOMY, LAPAROSCOPIC  2013    ENDOSCOPY, COLON, DIAGNOSTIC      HIATAL HERNIA REPAIR N/A 2022    LAPAROSCOPIC HIATAL HERNIA REPAIR AND

## 2025-06-02 DIAGNOSIS — F90.2 ATTENTION DEFICIT HYPERACTIVITY DISORDER (ADHD), COMBINED TYPE: ICD-10-CM

## 2025-06-02 DIAGNOSIS — F43.10 PTSD (POST-TRAUMATIC STRESS DISORDER): ICD-10-CM

## 2025-06-02 DIAGNOSIS — F33.1 MODERATE EPISODE OF RECURRENT MAJOR DEPRESSIVE DISORDER (HCC): ICD-10-CM

## 2025-06-02 RX ORDER — DEXTROAMPHETAMINE SACCHARATE, AMPHETAMINE ASPARTATE MONOHYDRATE, DEXTROAMPHETAMINE SULFATE AND AMPHETAMINE SULFATE 7.5; 7.5; 7.5; 7.5 MG/1; MG/1; MG/1; MG/1
30 CAPSULE, EXTENDED RELEASE ORAL DAILY
Qty: 30 CAPSULE | Refills: 0 | Status: SHIPPED | OUTPATIENT
Start: 2025-06-02 | End: 2025-07-02

## 2025-06-02 RX ORDER — ESCITALOPRAM OXALATE 10 MG/1
10 TABLET ORAL DAILY
Qty: 30 TABLET | Refills: 5 | Status: SHIPPED | OUTPATIENT
Start: 2025-06-02

## 2025-06-02 RX ORDER — PRAZOSIN HYDROCHLORIDE 2 MG/1
2 CAPSULE ORAL NIGHTLY
Qty: 90 CAPSULE | Refills: 3 | Status: SHIPPED | OUTPATIENT
Start: 2025-06-02

## 2025-06-02 NOTE — TELEPHONE ENCOUNTER
Future appt scheduled 08/26/2025                      Last appt 04/08/2025    Refill Request     CONFIRM preferrred pharmacy with the patient.    If Mail Order Rx - Pend for 90 day refill.      Last Seen: Last Seen Department: 5/7/2025  Last Seen by PCP: 4/8/2025    If no future appointment scheduled, route STAFF MESSAGE with patient name to the  Pool for scheduling.      Next Appointment:   Future Appointments   Date Time Provider Department Center   6/11/2025  3:30 PM Marli Falcon APRN - CNP PLASTICS/REC [facility]   8/26/2025  8:00 AM Favian Mosquera MD [place] [facility] DEP       Message sent to  to schedule appt with patient?  NO      Requested Prescriptions     Pending Prescriptions Disp Refills    prazosin (MINIPRESS) 2 MG capsule 90 capsule 3     Sig: Take 1 capsule by mouth nightly (Dose increase)    escitalopram (LEXAPRO) 10 MG tablet 30 tablet 5     Sig: Take 1 tablet by mouth daily    amphetamine-dextroamphetamine (ADDERALL XR) 30 MG extended release capsule 30 capsule 0     Sig: Take 1 capsule by mouth daily for 30 days. (Dose increase) Max Daily Amount: 30 mg

## 2025-06-03 DIAGNOSIS — F43.10 PTSD (POST-TRAUMATIC STRESS DISORDER): ICD-10-CM

## 2025-06-03 NOTE — TELEPHONE ENCOUNTER
Patient submitted medication refill yesterday and requested increase in dose of prazosin - message from patient included below. Please advise, [facility]       Refills have been requested for the following medications:         prazosin (MINIPRESS) 2 MG capsule [Dr. Favian Mosquera MD]      Patient Comment: Please increase this med if possible; I'm having increased PTSD with terrible nightmares and hypervigilance lately.  It isn't working like it used to.

## 2025-06-04 NOTE — TELEPHONE ENCOUNTER
Dr. Mosquera see below, I spoke to patient and she states she okay waiting for you to discuss med changes.

## 2025-06-10 RX ORDER — PRAZOSIN HYDROCHLORIDE 2 MG/1
4 CAPSULE ORAL NIGHTLY
Qty: 1 CAPSULE | Refills: 0
Start: 2025-06-10

## 2025-06-10 NOTE — TELEPHONE ENCOUNTER
Patient notified, she will call with update and if doing ok on increased dose will send in new prescription

## 2025-06-11 ENCOUNTER — OFFICE VISIT (OUTPATIENT)
Dept: SURGERY | Age: 35
End: 2025-06-11

## 2025-06-11 VITALS
HEIGHT: 64 IN | SYSTOLIC BLOOD PRESSURE: 118 MMHG | BODY MASS INDEX: 41.69 KG/M2 | WEIGHT: 244.2 LBS | HEART RATE: 92 BPM | DIASTOLIC BLOOD PRESSURE: 83 MMHG

## 2025-06-11 DIAGNOSIS — N62 MACROMASTIA: ICD-10-CM

## 2025-06-11 DIAGNOSIS — M79.3 PANNICULITIS: Primary | ICD-10-CM

## 2025-06-11 NOTE — PROGRESS NOTES
[facility]    Consultation requested by: Lynsey Chou MD.     CC: Body contouring    HPI: 34 y.o. female with a PMHx as delineated below who presents in consultation for body contouring. Patient states since losing weight she has hanging skin near her vagina that causes rashes and she has tried prescribed nystatin with some relief.     Since her last evaluation the patient states she has had a lot going on with being a single mom and some health issues. She has gained three pounds. She is feeling discouraged.     Bariatric surgery: Yes / date: 5/22 (Type: sleeve gastrectomy)    Max weight (lbs): 301  Lowest weight (lbs): 198  Current (lbs): 244 (241)  Weight change in the past 3 months: No  Max BMI: 51.7  Current BMI: Body mass index is 41.92 kg/m².    History of DVT/PE: No  Excess skin cover genitals: Yes  Previous body contouring procedures: No   Smoking: No      Last Mammogram: n/a    Current bra size: 38 G  Desired bra size: \"small as possible\"   Pregnancies/miscarriages: 6/2  Breast feeding: no future plans    Breast Symptoms:    Macromastia Symptoms:  Upper back pain: Yes      Bra strap grooves: Yes      Wears supportive bras (>1 yr): Yes      Tried conservative measures (PT, MDs,etc): Yes, OB, Physical therapy for back pain last year associated with macromastia       Intertrigo: Yes      Head/neck pain: Yes      Headaches: Yes      Paresthesias of hands/fingers: No    Past Medical History:   Diagnosis Date    ADD (attention deficit disorder)     Anemia     Asthma     last used inhaler yesterday    Autoimmune disorder     suspected Chrons    Chronic GERD 01/28/2020    Complication of anesthesia     asthma related-waking up    Depression     past history, now resolved    Diabetes mellitus (HCC)     pre diabetic; currently takes no meds    History of gestational diabetes 12/15/2023    History of suicidal tendencies     as a teen    Infertility, female     Left carpal tunnel

## 2025-06-12 NOTE — TELEPHONE ENCOUNTER
Pt has been APPROVED for the procedure   1) L GSV RFA + stab phlebectomies;   2) R GSV RFA + R LSV/Giacomini RFA +   stab phlebectomies referred by Dr Talamantes. Pls inform the patient that she can be scheduled per the validity dates at the approved hospital.    APPROVED CPT CODE 36573 (2 UNITS) 13459 (1 UNIT)  APPROVAL # [id number]  VALIDITY DATES  06/16/2025- 10/31/2025  APPROVED HOSPITAL- [facility]  STAB FEES- $1000 FOR EACH LEG  DIAGNOSIS CODES- I83.893  HOURS- 2 FOR EACH LEG  POST OP SCAN AND OV NEEDED. YES

## 2025-06-27 DIAGNOSIS — F43.10 PTSD (POST-TRAUMATIC STRESS DISORDER): ICD-10-CM

## 2025-06-27 RX ORDER — PRAZOSIN HYDROCHLORIDE 2 MG/1
4 CAPSULE ORAL NIGHTLY
Qty: 1 CAPSULE | Refills: 0 | Status: SHIPPED | OUTPATIENT
Start: 2025-06-27

## 2025-07-01 DIAGNOSIS — F43.10 PTSD (POST-TRAUMATIC STRESS DISORDER): ICD-10-CM

## 2025-07-01 DIAGNOSIS — F90.2 ATTENTION DEFICIT HYPERACTIVITY DISORDER (ADHD), COMBINED TYPE: ICD-10-CM

## 2025-07-01 RX ORDER — DEXTROAMPHETAMINE SACCHARATE, AMPHETAMINE ASPARTATE MONOHYDRATE, DEXTROAMPHETAMINE SULFATE AND AMPHETAMINE SULFATE 7.5; 7.5; 7.5; 7.5 MG/1; MG/1; MG/1; MG/1
30 CAPSULE, EXTENDED RELEASE ORAL DAILY
Qty: 30 CAPSULE | Refills: 0 | Status: SHIPPED | OUTPATIENT
Start: 2025-07-01 | End: 2025-07-31

## 2025-07-01 RX ORDER — PRAZOSIN HYDROCHLORIDE 2 MG/1
4 CAPSULE ORAL NIGHTLY
Qty: 60 CAPSULE | Refills: 3 | Status: SHIPPED | OUTPATIENT
Start: 2025-07-01

## 2025-07-01 NOTE — TELEPHONE ENCOUNTER
Prazosin 4 MG    [facility]      Patient states last month she was told to double up on 2 MG of this medication and 4 MG would be sent in this month.      Last OV- ED NUZHAT Reyes 5/7/25    Next OV- 8/26/25

## 2025-07-01 NOTE — TELEPHONE ENCOUNTER
Date of last refill of this med was 6/2/25, # of pills given 30 and # of refills given 0.  Their next appointment is 8/26/25, the last date patient was seen was 5/7/25.  Does patient have medication agreement on file? yes  Has drug screen been done in last 12 months if needed? no

## 2025-07-10 PROBLEM — I83.893 SYMPTOMATIC VARICOSE VEINS OF BOTH LOWER EXTREMITIES: Status: ACTIVE | Noted: 2025-07-10

## 2025-07-12 ENCOUNTER — HOSPITAL ENCOUNTER (OUTPATIENT)
Dept: ULTRASOUND IMAGING | Age: 35
Discharge: HOME | End: 2025-07-12
Attending: OBSTETRICS & GYNECOLOGY
Payer: COMMERCIAL

## 2025-07-12 DIAGNOSIS — Z30.431 IUD CHECK UP: ICD-10-CM

## 2025-07-12 PROCEDURE — 76856 US EXAM PELVIC COMPLETE: CPT

## 2025-07-18 ENCOUNTER — OFFICE VISIT (OUTPATIENT)
Dept: FAMILY MEDICINE CLINIC | Age: 35
End: 2025-07-18

## 2025-07-18 VITALS
BODY MASS INDEX: 41.54 KG/M2 | TEMPERATURE: 98.6 F | OXYGEN SATURATION: 97 % | WEIGHT: 242 LBS | SYSTOLIC BLOOD PRESSURE: 112 MMHG | HEART RATE: 101 BPM | DIASTOLIC BLOOD PRESSURE: 78 MMHG

## 2025-07-18 DIAGNOSIS — J02.9 SORETHROAT: Primary | ICD-10-CM

## 2025-07-18 DIAGNOSIS — J06.9 URI, ACUTE: ICD-10-CM

## 2025-07-18 LAB — S PYO AG THROAT QL: NORMAL

## 2025-07-18 RX ORDER — CLINDAMYCIN HYDROCHLORIDE 300 MG/1
300 CAPSULE ORAL 3 TIMES DAILY
Qty: 30 CAPSULE | Refills: 0 | Status: SHIPPED | OUTPATIENT
Start: 2025-07-18 | End: 2025-07-28

## 2025-07-18 ASSESSMENT — ENCOUNTER SYMPTOMS
SINUS PRESSURE: 1
GASTROINTESTINAL NEGATIVE: 1
SINUS PAIN: 1
EYES NEGATIVE: 1
SORE THROAT: 1
COUGH: 1

## 2025-07-18 NOTE — PATIENT INSTRUCTIONS
Please read the healthy family handout that you were given and share it with your family.       Please compare this printed medication list with your medications at home to be sure they are the same.  If you have any medications that are different please contact us immediately at [phone].     Also review your allergies that we have listed, these may also include medications that you have not been able to tolerate, make sure everything listed is correct. If you have any allergies that are different please contact us immediately at [phone].     You may receive a survey in the mail or by email asking about your experience during your visit today. Please complete and return to us so we know how we are serving you.

## 2025-07-18 NOTE — PROGRESS NOTES
CHIEF COMPLAINT  Chief Complaint   Patient presents with    Cough    Congestion    Pharyngitis    Sinusitis        HPI   Johana Alvarado is a 35 y.o. female who presents to the office complaining of cough, congestion, sore throat, sinus drainage, pressure x 5 days.  Patient reports taking multiple over-the-counter decongestants with no relief.  Patient denies any fever or chills.  No shortness of breath.  No other complaints, modifying factors or associated symptoms.     Nursing notes reviewed.   Past Medical History:   Diagnosis Date    ADD (attention deficit disorder)     Anemia     Asthma     last used inhaler yesterday    Autoimmune disorder     suspected Chrons    Chronic GERD 2020    Complication of anesthesia     asthma related-waking up    Depression     past history, now resolved    Diabetes mellitus (HCC)     pre diabetic; currently takes no meds    History of gestational diabetes 12/15/2023    History of suicidal tendencies     as a teen    Infertility, female     Left carpal tunnel syndrome 2018    Migraine headache     last migraine 3 weeks ago     (normal spontaneous vaginal delivery) 2021    PCOS (polycystic ovarian syndrome)     Postpartum depression     Pre-diabetes     PTSD (post-traumatic stress disorder)     with rare psychotic features in past, symptoms resolved now    Renal cyst, right 2024    Scoliosis     Seizures (HCC)     as teen    Subchorionic hematoma in first trimester (RESOLVED) 2023    Suicidal ideations     Syncope     during pregnancy    Tachycardia     slight arrythmia    Trauma      Past Surgical History:   Procedure Laterality Date    CHOLECYSTECTOMY  13    CHOLECYSTECTOMY, LAPAROSCOPIC  2013    ENDOSCOPY, COLON, DIAGNOSTIC      HIATAL HERNIA REPAIR N/A 2022    LAPAROSCOPIC HIATAL HERNIA REPAIR AND LYSIS OF ADHESIONS performed by Lynsey Chou MD at [facility] OR    LAPAROSCOPY Right 2020    DIAGNOSTIC LAPAROSCOPY,  RIGHT

## 2025-07-20 LAB — BACTERIA THROAT AEROBE CULT: NORMAL

## 2025-07-21 ENCOUNTER — TELEPHONE (OUTPATIENT)
Dept: FAMILY MEDICINE CLINIC | Age: 35
End: 2025-07-21

## 2025-07-21 LAB — BACTERIA THROAT AEROBE CULT: NORMAL

## 2025-07-21 NOTE — TELEPHONE ENCOUNTER
Patient was seen on Friday, she is still not feeling well and wanted to see if you could give her a note to work from home today and joon

## 2025-07-21 NOTE — TELEPHONE ENCOUNTER
Work excuse can be provided for today however patient's throat culture is negative she needs to make sure she is drinking plenty of fluids and staying hydrated as well as taking over-the-counter medications for symptomatic relief.

## 2025-08-06 ENCOUNTER — TELEPHONE (OUTPATIENT)
Dept: FAMILY MEDICINE CLINIC | Age: 35
End: 2025-08-06

## 2025-08-06 DIAGNOSIS — M54.50 CHRONIC LOW BACK PAIN, UNSPECIFIED BACK PAIN LATERALITY, UNSPECIFIED WHETHER SCIATICA PRESENT: Primary | ICD-10-CM

## 2025-08-06 DIAGNOSIS — G89.29 CHRONIC LOW BACK PAIN, UNSPECIFIED BACK PAIN LATERALITY, UNSPECIFIED WHETHER SCIATICA PRESENT: Primary | ICD-10-CM

## 2025-08-07 DIAGNOSIS — F90.2 ATTENTION DEFICIT HYPERACTIVITY DISORDER (ADHD), COMBINED TYPE: ICD-10-CM

## 2025-08-08 RX ORDER — DEXTROAMPHETAMINE SACCHARATE, AMPHETAMINE ASPARTATE MONOHYDRATE, DEXTROAMPHETAMINE SULFATE AND AMPHETAMINE SULFATE 7.5; 7.5; 7.5; 7.5 MG/1; MG/1; MG/1; MG/1
30 CAPSULE, EXTENDED RELEASE ORAL DAILY
Qty: 30 CAPSULE | Refills: 0 | Status: SHIPPED | OUTPATIENT
Start: 2025-08-08 | End: 2025-09-07

## 2025-08-26 ENCOUNTER — OFFICE VISIT (OUTPATIENT)
Dept: FAMILY MEDICINE CLINIC | Age: 35
End: 2025-08-26
Payer: COMMERCIAL

## 2025-08-26 VITALS
BODY MASS INDEX: 41.54 KG/M2 | TEMPERATURE: 98.2 F | WEIGHT: 242 LBS | HEART RATE: 72 BPM | DIASTOLIC BLOOD PRESSURE: 67 MMHG | SYSTOLIC BLOOD PRESSURE: 95 MMHG | OXYGEN SATURATION: 98 %

## 2025-08-26 DIAGNOSIS — D50.8 IRON DEFICIENCY ANEMIA SECONDARY TO INADEQUATE DIETARY IRON INTAKE: ICD-10-CM

## 2025-08-26 DIAGNOSIS — M54.42 CHRONIC LEFT-SIDED LOW BACK PAIN WITH LEFT-SIDED SCIATICA: ICD-10-CM

## 2025-08-26 DIAGNOSIS — J45.40 MODERATE PERSISTENT ASTHMA WITHOUT COMPLICATION: ICD-10-CM

## 2025-08-26 DIAGNOSIS — R73.03 PREDIABETES: ICD-10-CM

## 2025-08-26 DIAGNOSIS — K76.0 FATTY LIVER: ICD-10-CM

## 2025-08-26 DIAGNOSIS — F90.2 ATTENTION DEFICIT HYPERACTIVITY DISORDER (ADHD), COMBINED TYPE: ICD-10-CM

## 2025-08-26 DIAGNOSIS — F43.10 PTSD (POST-TRAUMATIC STRESS DISORDER): ICD-10-CM

## 2025-08-26 DIAGNOSIS — F33.1 MODERATE EPISODE OF RECURRENT MAJOR DEPRESSIVE DISORDER (HCC): Primary | ICD-10-CM

## 2025-08-26 DIAGNOSIS — G89.29 CHRONIC LEFT-SIDED LOW BACK PAIN WITH LEFT-SIDED SCIATICA: ICD-10-CM

## 2025-08-26 DIAGNOSIS — E78.2 MIXED HYPERLIPIDEMIA: ICD-10-CM

## 2025-08-26 LAB
ALBUMIN SERPL-MCNC: 4.2 G/DL (ref 3.4–5)
ALBUMIN/GLOB SERPL: 1.6 {RATIO} (ref 1.1–2.2)
ALP SERPL-CCNC: 75 U/L (ref 40–129)
ALT SERPL-CCNC: 8 U/L (ref 10–40)
ANION GAP SERPL CALCULATED.3IONS-SCNC: 10 MMOL/L (ref 3–16)
AST SERPL-CCNC: 12 U/L (ref 15–37)
BILIRUB SERPL-MCNC: 0.4 MG/DL (ref 0–1)
BUN SERPL-MCNC: 10 MG/DL (ref 7–20)
CALCIUM SERPL-MCNC: 9.1 MG/DL (ref 8.3–10.6)
CHLORIDE SERPL-SCNC: 104 MMOL/L (ref 99–110)
CHOLEST SERPL-MCNC: 178 MG/DL (ref 0–199)
CO2 SERPL-SCNC: 26 MMOL/L (ref 21–32)
CREAT SERPL-MCNC: 0.7 MG/DL (ref 0.6–1.1)
DEPRECATED RDW RBC AUTO: 15.4 % (ref 12.4–15.4)
FERRITIN SERPL IA-MCNC: 20.2 NG/ML (ref 15–150)
GFR SERPLBLD CREATININE-BSD FMLA CKD-EPI: >90 ML/MIN/{1.73_M2}
GLUCOSE SERPL-MCNC: 111 MG/DL (ref 70–99)
HCT VFR BLD AUTO: 36.5 % (ref 36–48)
HDLC SERPL-MCNC: 57 MG/DL (ref 40–60)
HGB BLD-MCNC: 12.2 G/DL (ref 12–16)
LDLC SERPL CALC-MCNC: 105 MG/DL
MCH RBC QN AUTO: 26.2 PG (ref 26–34)
MCHC RBC AUTO-ENTMCNC: 33.4 G/DL (ref 31–36)
MCV RBC AUTO: 78.6 FL (ref 80–100)
PLATELET # BLD AUTO: 196 K/UL (ref 135–450)
PMV BLD AUTO: 10.5 FL (ref 5–10.5)
POTASSIUM SERPL-SCNC: 3.9 MMOL/L (ref 3.5–5.1)
PROT SERPL-MCNC: 6.8 G/DL (ref 6.4–8.2)
RBC # BLD AUTO: 4.64 M/UL (ref 4–5.2)
SODIUM SERPL-SCNC: 140 MMOL/L (ref 136–145)
TRIGL SERPL-MCNC: 78 MG/DL (ref 0–150)
VLDLC SERPL CALC-MCNC: 16 MG/DL
WBC # BLD AUTO: 6.2 K/UL (ref 4–11)

## 2025-08-26 PROCEDURE — G8417 CALC BMI ABV UP PARAM F/U: HCPCS | Performed by: FAMILY MEDICINE

## 2025-08-26 PROCEDURE — 99214 OFFICE O/P EST MOD 30 MIN: CPT | Performed by: FAMILY MEDICINE

## 2025-08-26 PROCEDURE — G8427 DOCREV CUR MEDS BY ELIG CLIN: HCPCS | Performed by: FAMILY MEDICINE

## 2025-08-26 PROCEDURE — 36415 COLL VENOUS BLD VENIPUNCTURE: CPT | Performed by: FAMILY MEDICINE

## 2025-08-26 PROCEDURE — 1036F TOBACCO NON-USER: CPT | Performed by: FAMILY MEDICINE

## 2025-08-26 RX ORDER — PRAZOSIN HYDROCHLORIDE 2 MG/1
4 CAPSULE ORAL NIGHTLY
Qty: 60 CAPSULE | Refills: 3 | Status: SHIPPED | OUTPATIENT
Start: 2025-08-26

## 2025-08-26 RX ORDER — BUDESONIDE AND FORMOTEROL FUMARATE DIHYDRATE 160; 4.5 UG/1; UG/1
1 AEROSOL RESPIRATORY (INHALATION) 2 TIMES DAILY
Qty: 1 EACH | Refills: 5 | Status: SHIPPED | OUTPATIENT
Start: 2025-08-26

## 2025-08-26 RX ORDER — ESCITALOPRAM OXALATE 20 MG/1
20 TABLET ORAL DAILY
Qty: 30 TABLET | Refills: 5 | Status: SHIPPED | OUTPATIENT
Start: 2025-08-26

## 2025-08-27 LAB
EST. AVERAGE GLUCOSE BLD GHB EST-MCNC: 102.5 MG/DL
HBA1C MFR BLD: 5.2 %